# Patient Record
Sex: FEMALE | Race: WHITE | NOT HISPANIC OR LATINO | Employment: FULL TIME | URBAN - METROPOLITAN AREA
[De-identification: names, ages, dates, MRNs, and addresses within clinical notes are randomized per-mention and may not be internally consistent; named-entity substitution may affect disease eponyms.]

---

## 2017-04-14 DIAGNOSIS — Z13.1 ENCOUNTER FOR SCREENING FOR DIABETES MELLITUS: ICD-10-CM

## 2017-04-14 DIAGNOSIS — R53.83 OTHER FATIGUE: ICD-10-CM

## 2017-04-20 ENCOUNTER — TRANSCRIBE ORDERS (OUTPATIENT)
Dept: LAB | Facility: OTHER | Age: 62
End: 2017-04-20

## 2017-04-20 ENCOUNTER — APPOINTMENT (OUTPATIENT)
Dept: LAB | Facility: OTHER | Age: 62
End: 2017-04-20
Payer: COMMERCIAL

## 2017-04-20 DIAGNOSIS — Z13.1 ENCOUNTER FOR SCREENING FOR DIABETES MELLITUS: ICD-10-CM

## 2017-04-20 DIAGNOSIS — E78.5 HYPERLIPIDEMIA, UNSPECIFIED HYPERLIPIDEMIA TYPE: Primary | ICD-10-CM

## 2017-04-20 DIAGNOSIS — R53.83 OTHER FATIGUE: ICD-10-CM

## 2017-04-20 DIAGNOSIS — E78.5 HYPERLIPIDEMIA, UNSPECIFIED HYPERLIPIDEMIA TYPE: ICD-10-CM

## 2017-04-20 LAB
ALBUMIN SERPL BCP-MCNC: 3.7 G/DL (ref 3.5–5)
ALP SERPL-CCNC: 69 U/L (ref 46–116)
ALT SERPL W P-5'-P-CCNC: 18 U/L (ref 12–78)
ANION GAP SERPL CALCULATED.3IONS-SCNC: 7 MMOL/L (ref 4–13)
AST SERPL W P-5'-P-CCNC: 12 U/L (ref 5–45)
BILIRUB SERPL-MCNC: 0.48 MG/DL (ref 0.2–1)
BUN SERPL-MCNC: 17 MG/DL (ref 5–25)
CALCIUM SERPL-MCNC: 8.6 MG/DL (ref 8.3–10.1)
CHLORIDE SERPL-SCNC: 106 MMOL/L (ref 100–108)
CHOLEST SERPL-MCNC: 217 MG/DL (ref 50–200)
CO2 SERPL-SCNC: 27 MMOL/L (ref 21–32)
CREAT SERPL-MCNC: 0.59 MG/DL (ref 0.6–1.3)
GFR SERPL CREATININE-BSD FRML MDRD: >60 ML/MIN/1.73SQ M
GLUCOSE P FAST SERPL-MCNC: 92 MG/DL (ref 65–99)
HDLC SERPL-MCNC: 88 MG/DL (ref 40–60)
LDLC SERPL CALC-MCNC: 106 MG/DL (ref 0–100)
POTASSIUM SERPL-SCNC: 4.1 MMOL/L (ref 3.5–5.3)
PROT SERPL-MCNC: 6.7 G/DL (ref 6.4–8.2)
SODIUM SERPL-SCNC: 140 MMOL/L (ref 136–145)
TRIGL SERPL-MCNC: 115 MG/DL

## 2017-04-20 PROCEDURE — 80061 LIPID PANEL: CPT

## 2017-04-20 PROCEDURE — 36415 COLL VENOUS BLD VENIPUNCTURE: CPT

## 2017-04-20 PROCEDURE — 80053 COMPREHEN METABOLIC PANEL: CPT

## 2017-04-21 ENCOUNTER — GENERIC CONVERSION - ENCOUNTER (OUTPATIENT)
Dept: OTHER | Facility: OTHER | Age: 62
End: 2017-04-21

## 2017-07-06 ENCOUNTER — APPOINTMENT (OUTPATIENT)
Dept: LAB | Facility: OTHER | Age: 62
End: 2017-07-06
Payer: COMMERCIAL

## 2017-07-06 ENCOUNTER — GENERIC CONVERSION - ENCOUNTER (OUTPATIENT)
Dept: OTHER | Facility: OTHER | Age: 62
End: 2017-07-06

## 2017-07-06 ENCOUNTER — TRANSCRIBE ORDERS (OUTPATIENT)
Dept: LAB | Facility: OTHER | Age: 62
End: 2017-07-06

## 2017-07-06 DIAGNOSIS — Z00.8 HEALTH EXAMINATION IN POPULATION SURVEYS: Primary | ICD-10-CM

## 2017-07-06 DIAGNOSIS — Z00.8 HEALTH EXAMINATION IN POPULATION SURVEYS: ICD-10-CM

## 2017-07-06 LAB
CHOLEST SERPL-MCNC: 185 MG/DL (ref 50–200)
EST. AVERAGE GLUCOSE BLD GHB EST-MCNC: 114 MG/DL
HBA1C MFR BLD: 5.6 % (ref 4.2–6.3)
HDLC SERPL-MCNC: 73 MG/DL (ref 40–60)
LDLC SERPL CALC-MCNC: 98 MG/DL (ref 0–100)
TRIGL SERPL-MCNC: 70 MG/DL

## 2017-07-06 PROCEDURE — 83036 HEMOGLOBIN GLYCOSYLATED A1C: CPT

## 2017-07-06 PROCEDURE — 80061 LIPID PANEL: CPT

## 2017-07-06 PROCEDURE — 36415 COLL VENOUS BLD VENIPUNCTURE: CPT

## 2018-01-10 NOTE — RESULT NOTES
Verified Results  * MAMMO SCREENING BILATERAL W CAD [id number] 01:45PM Juan Antonio Anthony    Order Number: [id number]    Order Number: [id number]   TW Order Number: [id number]     Test Name Result Flag Reference   MAMMO SCREENING BILATERAL W CAD (Report)     Patient History:   Patient is postmenopausal    Family history of breast cancer in maternal cousin at age 27  Patient has never smoked  Patient's BMI is 27 4  Reason for exam: screening (asymptomatic)  Mammo Screening Bilateral W CAD: July 14, 2016 - Check In #:    [de-identified]   Bilateral CC and MLO view(s) were taken  Technologist: JOSE ANTONIO Ferrell (R)(M)   Prior study comparison: March 7, 2012, left breast unilateral    diagnostic mammogram, performed at [address] [place] [address]  June 8, 2011, left breast unilateral diagnostic    mammogram, performed at [address] [place] [address]  There are scattered fibroglandular densities  The parenchymal pattern appears stable  No dominant soft tissue    mass or suspicious calcifications are noted  The skin and nipple   contours are within normal limits  No mammographic evidence of malignancy  No    significant changes when compared with prior studies  ASSESSMENT: BiRad:1 - Negative     Recommendation:   Routine screening mammogram in 1 year  A reminder letter will be   scheduled  Analyzed by CAD     8-10% of cancers will be missed on mammography  Management of a    palpable abnormality must be based on clinical grounds  Patients   will be notified of their results via letter from our facility  Accredited by [employer] and FDA       Transcription Location: JOSE ANTONIO Nathan 98: [id number]     Risk Value(s):   Tyrer-Cuzick 10 Year: 2 729%, Tyrer-Cuzick Lifetime: 6 136%,    Myriad Table: 2 6%, HELGA 5 Year: 1 1%, NCI Lifetime: 4 9%   Signed by:   Miguel Dean MD   7/14/16       Discussion/Summary   normal mammogram recheck in 1 year

## 2018-01-13 VITALS
BODY MASS INDEX: 26.5 KG/M2 | DIASTOLIC BLOOD PRESSURE: 72 MMHG | SYSTOLIC BLOOD PRESSURE: 124 MMHG | HEIGHT: 62 IN | WEIGHT: 144 LBS

## 2018-01-18 NOTE — RESULT NOTES
Verified Results  (1) COMPREHENSIVE METABOLIC PANEL [id number] 04:12HM R[facility] [place] Order Number: [id number]     Test Name Result Flag Reference   SODIUM 140 mmol/L  136-145   POTASSIUM 4 1 mmol/L  3 5-5 3   CHLORIDE 106 mmol/L  100-108   CARBON DIOXIDE 27 mmol/L  21-32   ANION GAP (CALC) 7 mmol/L  4-13   BLOOD UREA NITROGEN 17 mg/dL  5-25   CREATININE 0 59 mg/dL L 0 60-1 30   Standardized to IDMS reference method   CALCIUM 8 6 mg/dL  8 3-10 1   BILI, TOTAL 0 48 mg/dL  0 20-1 00   ALK PHOSPHATAS 69 U/L     ALT (SGPT) 18 U/L  12-78   AST(SGOT) 12 U/L  5-45   ALBUMIN 3 7 g/dL  3 5-5 0   TOTAL PROTEIN 6 7 g/dL  6 4-8 2   eGFR Non-African American      >60 0 ml/min/1 73sq m   [facility] [employer] Education Program recommendations are as follows:  GFR calculation is accurate only with a steady state creatinine  Chronic Kidney disease less than 60 ml/min/1 73 sq  meters  Kidney failure less than 15 ml/min/1 73 sq  meters     GLUCOSE FASTING 92 mg/dL  65-99     (1) LIPID PANEL FASTING W DIRECT LDL REFLEX 20Apr2017 07:59AM Will Cheatham     Test Name Result Flag Reference   CHOLESTEROL 217 mg/dL H    LDL CHOLESTEROL CALCULATED 106 mg/dL H 0-100   This is a fasting blood test  Water,black tea or black  coffee only after 9:00pm the night before test  Drink 2 glasses of water the morning of test         Triglyceride:         Normal              <150 mg/dl       Borderline High    150-199 mg/dl       High               200-499 mg/dl       Very High          >499 mg/dl  Cholesterol:         Desirable        <200 mg/dl      Borderline High  200-239 mg/dl      High             >239 mg/dl  HDL Cholesterol:        High    >59 mg/dL      Low     <41 mg/dL  LDL Cholesterol:        Optimal          <100 mg/dl        Near Optimal     100-129 mg/dl        Above Optimal          Borderline High   130-159 mg/dl          High              160-189 mg/dl          Very High        >189 mg/dl  LDL CALCULATED:    This screening LDL is a calculated result  It does not have the accuracy of the Direct Measured LDL in the monitoring of patients with hyperlipidemia and/or statin therapy  Direct Measure LDL (XVZ987) must be ordered separately in these patients  TRIGLYCERIDES 115 mg/dL  <=150   Specimen collection should occur prior to N-Acetylcysteine or Metamizole administration due to the potential for falsely depressed results  HDL,DIRECT 88 mg/dL H 40-60   Specimen collection should occur prior to Metamizole administration due to the potential for falsely depressed results

## 2018-04-23 DIAGNOSIS — E78.00 HIGH CHOLESTEROL: Primary | ICD-10-CM

## 2018-04-23 RX ORDER — ROSUVASTATIN CALCIUM 5 MG/1
1 TABLET, COATED ORAL DAILY
COMMUNITY
End: 2018-04-23 | Stop reason: SDUPTHER

## 2018-04-23 RX ORDER — ROSUVASTATIN CALCIUM 5 MG/1
5 TABLET, COATED ORAL DAILY
Qty: 90 TABLET | Refills: 3 | Status: SHIPPED | OUTPATIENT
Start: 2018-04-23 | End: 2018-08-17 | Stop reason: ALTCHOICE

## 2018-06-07 DIAGNOSIS — F41.9 ANXIETY: Primary | ICD-10-CM

## 2018-06-07 RX ORDER — ESCITALOPRAM OXALATE 10 MG/1
10 TABLET ORAL DAILY
Qty: 90 TABLET | Refills: 3 | Status: SHIPPED | OUTPATIENT
Start: 2018-06-07 | End: 2019-03-14 | Stop reason: SDUPTHER

## 2018-06-07 RX ORDER — ESCITALOPRAM OXALATE 10 MG/1
1 TABLET ORAL DAILY
COMMUNITY
Start: 2014-05-16 | End: 2018-06-07 | Stop reason: SDUPTHER

## 2018-07-03 DIAGNOSIS — H93.8X2 SENSATION OF PLUGGED EAR ON LEFT SIDE: Primary | ICD-10-CM

## 2018-07-03 RX ORDER — PREDNISONE 10 MG/1
10 TABLET ORAL DAILY
Qty: 5 TABLET | Refills: 0 | Status: SHIPPED | OUTPATIENT
Start: 2018-07-03 | End: 2018-07-06

## 2018-07-06 ENCOUNTER — OFFICE VISIT (OUTPATIENT)
Dept: FAMILY MEDICINE CLINIC | Facility: CLINIC | Age: 63
End: 2018-07-06
Payer: COMMERCIAL

## 2018-07-06 VITALS
WEIGHT: 132 LBS | OXYGEN SATURATION: 98 % | HEIGHT: 61 IN | TEMPERATURE: 97 F | HEART RATE: 90 BPM | SYSTOLIC BLOOD PRESSURE: 134 MMHG | BODY MASS INDEX: 24.92 KG/M2 | DIASTOLIC BLOOD PRESSURE: 84 MMHG

## 2018-07-06 DIAGNOSIS — Z12.31 SCREENING MAMMOGRAM, ENCOUNTER FOR: ICD-10-CM

## 2018-07-06 DIAGNOSIS — H69.82 EUSTACHIAN TUBE DYSFUNCTION, LEFT: Primary | ICD-10-CM

## 2018-07-06 PROBLEM — H69.92 EUSTACHIAN TUBE DYSFUNCTION, LEFT: Status: ACTIVE | Noted: 2018-07-06

## 2018-07-06 PROCEDURE — 1101F PT FALLS ASSESS-DOCD LE1/YR: CPT | Performed by: FAMILY MEDICINE

## 2018-07-06 PROCEDURE — 99213 OFFICE O/P EST LOW 20 MIN: CPT | Performed by: FAMILY MEDICINE

## 2018-07-06 RX ORDER — PREDNISONE 10 MG/1
TABLET ORAL
Qty: 20 TABLET | Refills: 0 | Status: SHIPPED | OUTPATIENT
Start: 2018-07-06 | End: 2018-08-17 | Stop reason: ALTCHOICE

## 2018-07-06 NOTE — PROGRESS NOTES
Assessment/Plan:       Diagnoses and all orders for this visit:    Eustachian tube dysfunction, left  -     predniSONE 10 mg tablet; Take 4 tabs x 2 days, 3 tabs x 2 days, then 2 tabs x 2 days, then 1 tab x 2 days    Screening mammogram, encounter for  -     Mammo screening bilateral w cad; Future         Prednisone taper  Use Flonase and decongestants OTC  Call if not better 1 wk    Subjective:      Patient ID: Prabhakar Olivares is a 70 y o  female  Patient is having persistent left ear pressure and pain  She has been on Zithromax and prednisone 10 mg per day for the past week  She has also tried nasal sprays and over-the-counter antihistamines with no relief  She denies any fevers, nasal congestion, or sore throat  The following portions of the patient's history were reviewed and updated as appropriate: She  has no past medical history on file  She   Patient Active Problem List    Diagnosis Date Noted    Eustachian tube dysfunction, left 07/06/2018    Screening mammogram, encounter for 07/06/2018     She  has no past surgical history on file  Her family history is not on file  She  reports that she has never smoked  She has never used smokeless tobacco  Her alcohol and drug histories are not on file  Current Outpatient Prescriptions   Medication Sig Dispense Refill    escitalopram (LEXAPRO) 10 mg tablet Take 1 tablet (10 mg total) by mouth daily 90 tablet 3    predniSONE 10 mg tablet Take 4 tabs x 2 days, 3 tabs x 2 days, then 2 tabs x 2 days, then 1 tab x 2 days 20 tablet 0    rosuvastatin (CRESTOR) 5 mg tablet Take 1 tablet (5 mg total) by mouth daily 90 tablet 3     No current facility-administered medications for this visit        Current Outpatient Prescriptions on File Prior to Visit   Medication Sig    escitalopram (LEXAPRO) 10 mg tablet Take 1 tablet (10 mg total) by mouth daily    rosuvastatin (CRESTOR) 5 mg tablet Take 1 tablet (5 mg total) by mouth daily    [DISCONTINUED] predniSONE 10 mg tablet Take 1 tablet (10 mg total) by mouth daily     No current facility-administered medications on file prior to visit  She is allergic to sulfa antibiotics       Review of Systems   Constitutional: Negative for activity change, appetite change, chills and fever  HENT: Positive for ear pain  Negative for congestion  Eyes: Negative  Respiratory: Negative for shortness of breath and wheezing  Objective:      /84   Pulse 90   Temp (!) 97 °F (36 1 °C)   Ht 5' 1" (1 549 m)   Wt 59 9 kg (132 lb)   SpO2 98%   BMI 24 94 kg/m²          Physical Exam   Constitutional: She appears well-developed and well-nourished  No distress  HENT:   Head: Normocephalic and atraumatic  Right Ear: External ear normal    Left Ear: External ear normal  Tympanic membrane is injected  Mouth/Throat: Oropharynx is clear and moist  No oropharyngeal exudate  R ear small amt of cerumen in jeffrey  L ear TM slightly injected and dull   Eyes: Conjunctivae are normal  Pupils are equal, round, and reactive to light  Pulmonary/Chest: Effort normal    Skin: She is not diaphoretic  Nursing note and vitals reviewed

## 2018-08-02 ENCOUNTER — APPOINTMENT (OUTPATIENT)
Dept: LAB | Facility: CLINIC | Age: 63
End: 2018-08-02

## 2018-08-02 ENCOUNTER — TRANSCRIBE ORDERS (OUTPATIENT)
Dept: ADMINISTRATIVE | Facility: HOSPITAL | Age: 63
End: 2018-08-02

## 2018-08-02 DIAGNOSIS — Z00.8 HEALTH EXAMINATION IN POPULATION SURVEY: ICD-10-CM

## 2018-08-02 DIAGNOSIS — Z00.8 HEALTH EXAMINATION IN POPULATION SURVEY: Primary | ICD-10-CM

## 2018-08-02 LAB
CHOLEST SERPL-MCNC: 206 MG/DL (ref 50–200)
EST. AVERAGE GLUCOSE BLD GHB EST-MCNC: 120 MG/DL
HBA1C MFR BLD: 5.8 % (ref 4.2–6.3)
HDLC SERPL-MCNC: 75 MG/DL (ref 40–60)
LDLC SERPL CALC-MCNC: 109 MG/DL (ref 0–100)
NONHDLC SERPL-MCNC: 131 MG/DL
TRIGL SERPL-MCNC: 108 MG/DL

## 2018-08-02 PROCEDURE — 80061 LIPID PANEL: CPT

## 2018-08-02 PROCEDURE — 36415 COLL VENOUS BLD VENIPUNCTURE: CPT

## 2018-08-02 PROCEDURE — 83036 HEMOGLOBIN GLYCOSYLATED A1C: CPT

## 2018-08-17 ENCOUNTER — OFFICE VISIT (OUTPATIENT)
Dept: FAMILY MEDICINE CLINIC | Facility: CLINIC | Age: 63
End: 2018-08-17
Payer: COMMERCIAL

## 2018-08-17 VITALS
DIASTOLIC BLOOD PRESSURE: 76 MMHG | HEIGHT: 61 IN | BODY MASS INDEX: 24.92 KG/M2 | SYSTOLIC BLOOD PRESSURE: 138 MMHG | OXYGEN SATURATION: 98 % | TEMPERATURE: 97 F | HEART RATE: 81 BPM | WEIGHT: 132 LBS

## 2018-08-17 DIAGNOSIS — F41.8 SITUATIONAL ANXIETY: Primary | ICD-10-CM

## 2018-08-17 PROBLEM — H69.92 EUSTACHIAN TUBE DYSFUNCTION, LEFT: Status: RESOLVED | Noted: 2018-07-06 | Resolved: 2018-08-17

## 2018-08-17 PROBLEM — H69.82 EUSTACHIAN TUBE DYSFUNCTION, LEFT: Status: RESOLVED | Noted: 2018-07-06 | Resolved: 2018-08-17

## 2018-08-17 PROCEDURE — 3008F BODY MASS INDEX DOCD: CPT | Performed by: NURSE PRACTITIONER

## 2018-08-17 PROCEDURE — 99214 OFFICE O/P EST MOD 30 MIN: CPT | Performed by: NURSE PRACTITIONER

## 2018-08-17 RX ORDER — PROPRANOLOL HYDROCHLORIDE 20 MG/1
20 TABLET ORAL EVERY 12 HOURS SCHEDULED
Qty: 60 TABLET | Refills: 0 | Status: SHIPPED | OUTPATIENT
Start: 2018-08-17 | End: 2020-09-16

## 2018-08-17 RX ORDER — SIMVASTATIN 10 MG
10 TABLET ORAL
COMMUNITY
End: 2019-08-09 | Stop reason: SDUPTHER

## 2018-08-17 NOTE — PROGRESS NOTES
Assessment/Plan:    No problem-specific Assessment & Plan notes found for this encounter  Diagnoses and all orders for this visit:    Situational anxiety  Comments: Will order the Propranolol to take as needed for anxiety in addition to staying on the Lexapro 10 mg daily   Orders:  -     propranolol (INDERAL) 20 mg tablet; Take 1 tablet (20 mg total) by mouth every 12 (twelve) hours for 30 days    Other orders  -     simvastatin (ZOCOR) 10 mg tablet; Take 10 mg by mouth daily at bedtime          Subjective:      Patient ID: Kyaw Cervantes is a 70 y o  female  Patient here with check up on her anxiety and feeling stress and major stressor is work and reports that her symptoms are getting worse when she is at work and at home is doing fine no issues  Patient reports that it is affecting patient to the extent that when she goes home she eats dinner and is in bed by 7 PM and sleeping until 3 AM and no issue with sleep and waking up and has her routine of having tea and waking about 6 AM and ruminating about the work day  Patient when she is at work feeling the stress building throughout the day and 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 and feeling tense and anxious regarding  Patient is taking Lexapro 10 mg  PHQ-9 score is 6   and TIFFANIE-7 scoring 11         The following portions of the patient's history were reviewed and updated as appropriate: She  has a past medical history of Hypercholesterolemia  She   Patient Active Problem List    Diagnosis Date Noted    Situational anxiety 08/17/2018    Screening mammogram, encounter for 07/06/2018     She  has a past surgical history that includes Colonoscopy and Dilation and curettage of uterus  Her family history includes Arthritis in her family; Brain cancer in her father; Hyperlipidemia in her family; Hypertension in her family; Lung cancer in her father  She  reports that she has never smoked   She has never used smokeless tobacco  She reports that she does not drink alcohol or use drugs  She is allergic to sulfa antibiotics       Review of Systems   Constitutional: Positive for fatigue  HENT: Negative  Eyes: Negative  Respiratory: Negative  Cardiovascular: Negative  Gastrointestinal: Negative  Endocrine: Negative  Genitourinary: Negative  Musculoskeletal: Negative  Skin: Negative  Allergic/Immunologic: Negative  Neurological: Negative  Hematological: Negative  Psychiatric/Behavioral: Positive for dysphoric mood and sleep disturbance  The patient is nervous/anxious  Objective:      /76   Pulse 81   Temp (!) 97 °F (36 1 °C)   Ht 5' 1" (1 549 m)   Wt 59 9 kg (132 lb)   SpO2 98%   BMI 24 94 kg/m²          Physical Exam   Constitutional: She is oriented to person, place, and time  Vital signs are normal  She appears well-developed and well-nourished  No distress  HENT:   Head: Normocephalic and atraumatic  Eyes: Pupils are equal, round, and reactive to light  Neck: Normal range of motion  No thyromegaly present  Cardiovascular: Normal rate, regular rhythm, normal heart sounds and intact distal pulses  No murmur heard  Pulmonary/Chest: Effort normal and breath sounds normal  No respiratory distress  She has no wheezes  Abdominal: Soft  Bowel sounds are normal    Musculoskeletal: Normal range of motion  Neurological: She is alert and oriented to person, place, and time  Skin: Skin is warm and dry  Psychiatric: Her speech is normal and behavior is normal  Judgment and thought content normal  Her mood appears anxious  Cognition and memory are normal    Nursing note and vitals reviewed

## 2018-10-18 ENCOUNTER — APPOINTMENT (OUTPATIENT)
Dept: LAB | Facility: CLINIC | Age: 63
End: 2018-10-18
Payer: COMMERCIAL

## 2018-10-18 ENCOUNTER — OFFICE VISIT (OUTPATIENT)
Dept: FAMILY MEDICINE CLINIC | Facility: CLINIC | Age: 63
End: 2018-10-18
Payer: COMMERCIAL

## 2018-10-18 VITALS
OXYGEN SATURATION: 98 % | BODY MASS INDEX: 24.55 KG/M2 | TEMPERATURE: 100.9 F | SYSTOLIC BLOOD PRESSURE: 128 MMHG | DIASTOLIC BLOOD PRESSURE: 78 MMHG | HEART RATE: 58 BPM | WEIGHT: 130 LBS | HEIGHT: 61 IN

## 2018-10-18 DIAGNOSIS — R50.9 FEVER AND CHILLS: ICD-10-CM
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
## 2018-10-18 DIAGNOSIS — M54.5 LOW BACK PAIN, UNSPECIFIED BACK PAIN LATERALITY, UNSPECIFIED CHRONICITY, WITH SCIATICA PRESENCE UNSPECIFIED: Primary | ICD-10-CM

## 2018-10-18 DIAGNOSIS — M25.50 ARTHRALGIA, UNSPECIFIED JOINT: ICD-10-CM

## 2018-10-18 PROBLEM — M54.50 LOW BACK PAIN: Status: ACTIVE | Noted: 2018-10-18

## 2018-10-18 LAB
ALBUMIN SERPL BCP-MCNC: 4.2 G/DL (ref 3.5–5)
ALP SERPL-CCNC: 78 U/L (ref 46–116)
ALT SERPL W P-5'-P-CCNC: 17 U/L (ref 12–78)
ANION GAP SERPL CALCULATED.3IONS-SCNC: 7 MMOL/L (ref 4–13)
AST SERPL W P-5'-P-CCNC: 16 U/L (ref 5–45)
BASOPHILS # BLD AUTO: 0.03 THOUSANDS/ΜL (ref 0–0.1)
BASOPHILS NFR BLD AUTO: 0 % (ref 0–1)
BILIRUB SERPL-MCNC: 0.34 MG/DL (ref 0.2–1)
BUN SERPL-MCNC: 13 MG/DL (ref 5–25)
CALCIUM SERPL-MCNC: 9.5 MG/DL (ref 8.3–10.1)
CHLORIDE SERPL-SCNC: 102 MMOL/L (ref 100–108)
CO2 SERPL-SCNC: 26 MMOL/L (ref 21–32)
CREAT SERPL-MCNC: 0.83 MG/DL (ref 0.6–1.3)
EOSINOPHIL # BLD AUTO: 0 THOUSAND/ΜL (ref 0–0.61)
EOSINOPHIL NFR BLD AUTO: 0 % (ref 0–6)
ERYTHROCYTE [DISTWIDTH] IN BLOOD BY AUTOMATED COUNT: 12.9 % (ref 11.6–15.1)
GFR SERPL CREATININE-BSD FRML MDRD: 74 ML/MIN/1.73SQ M
GLUCOSE P FAST SERPL-MCNC: 121 MG/DL (ref 65–99)
HCT VFR BLD AUTO: 49.1 % (ref 34.8–46.1)
HGB BLD-MCNC: 15 G/DL (ref 11.5–15.4)
IMM GRANULOCYTES # BLD AUTO: 0.03 THOUSAND/UL (ref 0–0.2)
IMM GRANULOCYTES NFR BLD AUTO: 0 % (ref 0–2)
LYMPHOCYTES # BLD AUTO: 1.01 THOUSANDS/ΜL (ref 0.6–4.47)
LYMPHOCYTES NFR BLD AUTO: 12 % (ref 14–44)
MCH RBC QN AUTO: 27.3 PG (ref 26.8–34.3)
MCHC RBC AUTO-ENTMCNC: 30.5 G/DL (ref 31.4–37.4)
MCV RBC AUTO: 89 FL (ref 82–98)
MONOCYTES # BLD AUTO: 1.07 THOUSAND/ΜL (ref 0.17–1.22)
MONOCYTES NFR BLD AUTO: 12 % (ref 4–12)
NEUTROPHILS # BLD AUTO: 6.66 THOUSANDS/ΜL (ref 1.85–7.62)
NEUTS SEG NFR BLD AUTO: 76 % (ref 43–75)
NRBC BLD AUTO-RTO: 0 /100 WBCS
PLATELET # BLD AUTO: 263 THOUSANDS/UL (ref 149–390)
PMV BLD AUTO: 10.3 FL (ref 8.9–12.7)
POTASSIUM SERPL-SCNC: 3.9 MMOL/L (ref 3.5–5.3)
PROT SERPL-MCNC: 7.9 G/DL (ref 6.4–8.2)
RBC # BLD AUTO: 5.5 MILLION/UL (ref 3.81–5.12)
SL AMB  POCT GLUCOSE, UA: ABNORMAL
SL AMB LEUKOCYTE ESTERASE,UA: ABNORMAL
SL AMB POCT BILIRUBIN,UA: ABNORMAL
SL AMB POCT BLOOD,UA: ABNORMAL
SL AMB POCT CLARITY,UA: CLEAR
SL AMB POCT COLOR,UA: YELLOW
SL AMB POCT KETONES,UA: ABNORMAL
SL AMB POCT NITRITE,UA: ABNORMAL
SL AMB POCT PH,UA: 5
SL AMB POCT SPECIFIC GRAVITY,UA: 1.03
SL AMB POCT URINE PROTEIN: ABNORMAL
SL AMB POCT UROBILINOGEN: 0.2
SODIUM SERPL-SCNC: 135 MMOL/L (ref 136–145)
WBC # BLD AUTO: 8.8 THOUSAND/UL (ref 4.31–10.16)

## 2018-10-18 PROCEDURE — 86788 WEST NILE VIRUS AB IGM: CPT

## 2018-10-18 PROCEDURE — 99213 OFFICE O/P EST LOW 20 MIN: CPT | Performed by: NURSE PRACTITIONER

## 2018-10-18 PROCEDURE — 86789 WEST NILE VIRUS ANTIBODY: CPT

## 2018-10-18 PROCEDURE — 81003 URINALYSIS AUTO W/O SCOPE: CPT | Performed by: NURSE PRACTITIONER

## 2018-10-18 PROCEDURE — 3008F BODY MASS INDEX DOCD: CPT | Performed by: NURSE PRACTITIONER

## 2018-10-18 PROCEDURE — 87086 URINE CULTURE/COLONY COUNT: CPT | Performed by: NURSE PRACTITIONER

## 2018-10-18 PROCEDURE — 36415 COLL VENOUS BLD VENIPUNCTURE: CPT

## 2018-10-18 PROCEDURE — 1036F TOBACCO NON-USER: CPT | Performed by: NURSE PRACTITIONER

## 2018-10-18 PROCEDURE — 80053 COMPREHEN METABOLIC PANEL: CPT

## 2018-10-18 PROCEDURE — 85025 COMPLETE CBC W/AUTO DIFF WBC: CPT

## 2018-10-18 NOTE — PROGRESS NOTES
Assessment/Plan:    No problem-specific Assessment & Plan notes found for this encounter  Diagnoses and all orders for this visit:    Fever and chills  Comments:  Unclear etiology with her recent mosquito bites and symptoms of arthralgias fever and chills and fatigue will check labs for [place]ile   Orders:  -     CBC and differential; Future  -     Comprehensive metabolic panel; Future  -     West Nile antibodies, IgG and IgM; Future    Low back pain, unspecified back pain laterality, unspecified chronicity, with sciatica presence unspecified  -     POCT urine dip auto non-scope    Mosquito bite, initial encounter  -     CBC and differential; Future  -     Comprehensive metabolic panel; Future  -     West Nile antibodies, IgG and IgM; Future    Arthralgia, unspecified joint  -     CBC and differential; Future  -     Comprehensive metabolic panel; Future  -     West Nile antibodies, IgG and IgM; Future          Subjective:      Patient ID: Martin Moya is a 70 y o  female  Patient here and reports that she is haviung severe body aches and legs joints and arms and having a cough and feeling unwell feeling fatigued and weak  Patient is not eating or drinking much  Patient has lower back pains and no complaints of urinary symptoms  The following portions of the patient's history were reviewed and updated as appropriate:   She  has a past medical history of Hypercholesterolemia  She   Patient Active Problem List    Diagnosis Date Noted    Low back pain 10/18/2018    Mosquito bite 10/18/2018    Arthralgia 10/18/2018    Fever and chills 10/18/2018    Situational anxiety 08/17/2018    Screening mammogram, encounter for 07/06/2018     She  has a past surgical history that includes Colonoscopy and Dilation and curettage of uterus  Her family history includes Arthritis in her family; Brain cancer in her father; Hyperlipidemia in her family; Hypertension in her family; Lung cancer in her father    She reports that she has never smoked  She has never used smokeless tobacco  She reports that she does not drink alcohol or use drugs  She is allergic to sulfa antibiotics       Review of Systems   Constitutional: Positive for activity change, appetite change, fatigue and fever  HENT: Positive for congestion and sneezing  Eyes: Negative  Respiratory: Positive for cough  Cardiovascular: Negative  Gastrointestinal: Positive for nausea  Endocrine: Negative  Genitourinary: Positive for hematuria  Musculoskeletal: Positive for back pain  Skin: Negative  Allergic/Immunologic: Negative  Neurological: Positive for headaches  Hematological: Negative  Psychiatric/Behavioral: Negative  Objective:      /78   Pulse 58   Temp (!) 100 9 °F (38 3 °C)   Ht 5' 1" (1 549 m)   Wt 59 kg (130 lb)   SpO2 98%   BMI 24 56 kg/m²          Physical Exam   Constitutional: She is oriented to person, place, and time  Vital signs are normal  She appears well-developed and well-nourished  She has a sickly appearance  HENT:   Head: Normocephalic and atraumatic  Right Ear: External ear normal    Left Ear: External ear normal    Nose: Nose normal    Mouth/Throat: Oropharynx is clear and moist  No oropharyngeal exudate  Eyes: Pupils are equal, round, and reactive to light  Neck: Normal range of motion  No thyromegaly present  Cardiovascular: Normal rate, regular rhythm, normal heart sounds and intact distal pulses  No murmur heard  Pulmonary/Chest: Effort normal and breath sounds normal  No respiratory distress  She has no wheezes  Abdominal: Soft  Bowel sounds are normal    Musculoskeletal: Normal range of motion  Neurological: She is alert and oriented to person, place, and time  Skin: Skin is warm and dry  Psychiatric: She has a normal mood and affect  Her behavior is normal  Judgment and thought content normal    Nursing note and vitals reviewed

## 2018-10-19 LAB — BACTERIA UR CULT: NORMAL

## 2018-10-22 DIAGNOSIS — J01.00 ACUTE NON-RECURRENT MAXILLARY SINUSITIS: Primary | ICD-10-CM

## 2018-10-22 LAB
WNV IGG SER QL IA: NEGATIVE
WNV IGM SER QL IA: NEGATIVE

## 2018-10-22 RX ORDER — AZITHROMYCIN 250 MG/1
TABLET, FILM COATED ORAL
Qty: 6 TABLET | Refills: 1 | Status: SHIPPED | OUTPATIENT
Start: 2018-10-22 | End: 2018-10-26

## 2018-10-24 ENCOUNTER — TELEPHONE (OUTPATIENT)
Dept: FAMILY MEDICINE CLINIC | Facility: CLINIC | Age: 63
End: 2018-10-24

## 2018-10-24 NOTE — TELEPHONE ENCOUNTER
Patient needs a work excuse for the rest of the week, not feeling any better  She will return on Monday  Call pt if ok

## 2018-11-12 ENCOUNTER — CLINICAL SUPPORT (OUTPATIENT)
Dept: FAMILY MEDICINE CLINIC | Facility: CLINIC | Age: 63
End: 2018-11-12
Payer: COMMERCIAL

## 2018-11-12 DIAGNOSIS — Z23 NEED FOR PNEUMOCOCCAL VACCINATION: Primary | ICD-10-CM

## 2018-11-12 PROCEDURE — 90670 PCV13 VACCINE IM: CPT

## 2018-11-12 PROCEDURE — 90471 IMMUNIZATION ADMIN: CPT

## 2019-01-25 ENCOUNTER — TELEPHONE (OUTPATIENT)
Dept: FAMILY MEDICINE CLINIC | Facility: CLINIC | Age: 64
End: 2019-01-25

## 2019-01-25 DIAGNOSIS — R09.81 HEAD CONGESTION: Primary | ICD-10-CM

## 2019-01-25 DIAGNOSIS — J32.9 OTHER SINUSITIS, UNSPECIFIED CHRONICITY: Primary | ICD-10-CM

## 2019-01-25 RX ORDER — METHYLPREDNISOLONE 4 MG/1
TABLET ORAL
Qty: 1 EACH | Refills: 0 | Status: CANCELLED | OUTPATIENT
Start: 2019-01-25

## 2019-01-25 RX ORDER — METHYLPREDNISOLONE 4 MG/1
TABLET ORAL
Qty: 21 EACH | Refills: 0 | Status: SHIPPED | OUTPATIENT
Start: 2019-01-25 | End: 2020-09-16

## 2019-01-25 RX ORDER — FLUTICASONE PROPIONATE 50 MCG
1 SPRAY, SUSPENSION (ML) NASAL DAILY
Qty: 1 BOTTLE | Refills: 1 | Status: SHIPPED | OUTPATIENT
Start: 2019-01-25 | End: 2022-03-17

## 2019-01-25 NOTE — TELEPHONE ENCOUNTER
Patient has a sinus infection - a lot of PND , would like to know if she can have a steroid krishna - will order, please approve

## 2019-03-14 DIAGNOSIS — F41.9 ANXIETY: ICD-10-CM

## 2019-03-14 RX ORDER — ESCITALOPRAM OXALATE 10 MG/1
10 TABLET ORAL DAILY
Qty: 90 TABLET | Refills: 3 | Status: SHIPPED | OUTPATIENT
Start: 2019-03-14 | End: 2019-08-09 | Stop reason: SDUPTHER

## 2019-04-14 DIAGNOSIS — E78.00 HIGH CHOLESTEROL: ICD-10-CM

## 2019-04-14 RX ORDER — ROSUVASTATIN CALCIUM 5 MG/1
TABLET, COATED ORAL
Qty: 90 TABLET | Refills: 3 | Status: SHIPPED | OUTPATIENT
Start: 2019-04-14 | End: 2020-09-16

## 2019-08-01 DIAGNOSIS — Z13.1 SCREENING FOR DIABETES MELLITUS: ICD-10-CM

## 2019-08-01 DIAGNOSIS — E78.5 HYPERLIPIDEMIA, UNSPECIFIED HYPERLIPIDEMIA TYPE: Primary | ICD-10-CM

## 2019-08-09 ENCOUNTER — TRANSCRIBE ORDERS (OUTPATIENT)
Dept: ADMINISTRATIVE | Facility: HOSPITAL | Age: 64
End: 2019-08-09

## 2019-08-09 DIAGNOSIS — Z12.39 BREAST SCREENING, UNSPECIFIED: Primary | ICD-10-CM

## 2019-08-09 DIAGNOSIS — E78.00 HIGH CHOLESTEROL: Primary | ICD-10-CM

## 2019-08-09 DIAGNOSIS — F41.9 ANXIETY: ICD-10-CM

## 2019-08-09 RX ORDER — ESCITALOPRAM OXALATE 10 MG/1
10 TABLET ORAL DAILY
Qty: 90 TABLET | Refills: 3 | Status: SHIPPED | OUTPATIENT
Start: 2019-08-09 | End: 2020-03-18

## 2019-08-09 RX ORDER — SIMVASTATIN 10 MG
10 TABLET ORAL
Qty: 90 TABLET | Refills: 3 | Status: SHIPPED | OUTPATIENT
Start: 2019-08-09 | End: 2020-03-12

## 2019-08-15 ENCOUNTER — HOSPITAL ENCOUNTER (OUTPATIENT)
Dept: MAMMOGRAPHY | Facility: CLINIC | Age: 64
Discharge: HOME | End: 2019-08-15
Payer: COMMERCIAL

## 2019-08-15 VITALS — WEIGHT: 132 LBS | BODY MASS INDEX: 24.29 KG/M2 | HEIGHT: 62 IN

## 2019-08-15 DIAGNOSIS — Z12.39 BREAST SCREENING, UNSPECIFIED: ICD-10-CM

## 2019-08-15 PROCEDURE — 77067 SCR MAMMO BI INCL CAD: CPT

## 2019-08-22 ENCOUNTER — APPOINTMENT (OUTPATIENT)
Dept: LAB | Facility: CLINIC | Age: 64
End: 2019-08-22
Payer: COMMERCIAL

## 2019-08-22 DIAGNOSIS — Z13.1 SCREENING FOR DIABETES MELLITUS: ICD-10-CM

## 2019-08-22 DIAGNOSIS — E78.5 HYPERLIPIDEMIA, UNSPECIFIED HYPERLIPIDEMIA TYPE: ICD-10-CM

## 2019-08-22 LAB
ALBUMIN SERPL BCP-MCNC: 4.2 G/DL (ref 3.5–5)
ALP SERPL-CCNC: 70 U/L (ref 46–116)
ALT SERPL W P-5'-P-CCNC: 16 U/L (ref 12–78)
ANION GAP SERPL CALCULATED.3IONS-SCNC: 9 MMOL/L (ref 4–13)
AST SERPL W P-5'-P-CCNC: 17 U/L (ref 5–45)
BILIRUB SERPL-MCNC: 0.61 MG/DL (ref 0.2–1)
BUN SERPL-MCNC: 18 MG/DL (ref 5–25)
CALCIUM SERPL-MCNC: 9 MG/DL (ref 8.3–10.1)
CHLORIDE SERPL-SCNC: 111 MMOL/L (ref 100–108)
CHOLEST SERPL-MCNC: 205 MG/DL (ref 50–200)
CO2 SERPL-SCNC: 27 MMOL/L (ref 21–32)
CREAT SERPL-MCNC: 0.55 MG/DL (ref 0.6–1.3)
GFR SERPL CREATININE-BSD FRML MDRD: 98 ML/MIN/1.73SQ M
GLUCOSE P FAST SERPL-MCNC: 62 MG/DL (ref 65–99)
HDLC SERPL-MCNC: 73 MG/DL (ref 40–60)
LDLC SERPL CALC-MCNC: 117 MG/DL (ref 0–100)
NONHDLC SERPL-MCNC: 132 MG/DL
POTASSIUM SERPL-SCNC: 4.2 MMOL/L (ref 3.5–5.3)
PROT SERPL-MCNC: 6.7 G/DL (ref 6.4–8.2)
SODIUM SERPL-SCNC: 147 MMOL/L (ref 136–145)
TRIGL SERPL-MCNC: 73 MG/DL

## 2019-08-22 PROCEDURE — 80061 LIPID PANEL: CPT

## 2019-08-22 PROCEDURE — 80053 COMPREHEN METABOLIC PANEL: CPT

## 2019-08-22 PROCEDURE — 36415 COLL VENOUS BLD VENIPUNCTURE: CPT

## 2019-08-29 DIAGNOSIS — R09.81 SINUS CONGESTION: Primary | ICD-10-CM

## 2019-08-29 RX ORDER — AZITHROMYCIN 500 MG/1
500 TABLET, FILM COATED ORAL DAILY
COMMUNITY
End: 2019-08-29

## 2019-08-29 RX ORDER — AZITHROMYCIN 1 G
1 PACKET (EA) ORAL ONCE
COMMUNITY
End: 2019-08-29

## 2019-08-29 RX ORDER — AZITHROMYCIN 250 MG/1
TABLET, FILM COATED ORAL
Qty: 6 TABLET | Refills: 2 | Status: SHIPPED | OUTPATIENT
Start: 2019-08-29 | End: 2019-09-02

## 2019-08-29 RX ORDER — AZITHROMYCIN 250 MG/1
500 TABLET, FILM COATED ORAL EVERY 24 HOURS
COMMUNITY
End: 2019-08-29

## 2020-03-12 DIAGNOSIS — E78.00 HIGH CHOLESTEROL: ICD-10-CM

## 2020-03-12 RX ORDER — SIMVASTATIN 10 MG
10 TABLET ORAL
Qty: 30 TABLET | Refills: 0 | Status: SHIPPED | OUTPATIENT
Start: 2020-03-12 | End: 2020-06-15

## 2020-03-18 DIAGNOSIS — F41.9 ANXIETY: ICD-10-CM

## 2020-03-18 RX ORDER — ESCITALOPRAM OXALATE 10 MG/1
10 TABLET ORAL DAILY
Qty: 90 TABLET | Refills: 0 | Status: SHIPPED | OUTPATIENT
Start: 2020-03-18 | End: 2020-04-22

## 2020-04-22 DIAGNOSIS — F41.9 ANXIETY: ICD-10-CM

## 2020-04-22 RX ORDER — ESCITALOPRAM OXALATE 10 MG/1
10 TABLET ORAL DAILY
Qty: 90 TABLET | Refills: 0 | Status: SHIPPED | OUTPATIENT
Start: 2020-04-22 | End: 2020-07-17

## 2020-06-13 DIAGNOSIS — E78.00 HIGH CHOLESTEROL: ICD-10-CM

## 2020-06-15 RX ORDER — SIMVASTATIN 10 MG
TABLET ORAL
Qty: 30 TABLET | Refills: 0 | Status: SHIPPED | OUTPATIENT
Start: 2020-06-15 | End: 2020-07-17

## 2020-07-17 DIAGNOSIS — E78.00 HIGH CHOLESTEROL: ICD-10-CM

## 2020-07-17 DIAGNOSIS — F41.9 ANXIETY: ICD-10-CM

## 2020-07-17 RX ORDER — ESCITALOPRAM OXALATE 10 MG/1
10 TABLET ORAL DAILY
Qty: 90 TABLET | Refills: 0 | Status: SHIPPED | OUTPATIENT
Start: 2020-07-17 | End: 2020-09-16 | Stop reason: SDUPTHER

## 2020-07-17 RX ORDER — SIMVASTATIN 10 MG
TABLET ORAL
Qty: 30 TABLET | Refills: 0 | Status: SHIPPED | OUTPATIENT
Start: 2020-07-17 | End: 2020-09-07

## 2020-09-07 DIAGNOSIS — E78.00 HIGH CHOLESTEROL: ICD-10-CM

## 2020-09-07 RX ORDER — SIMVASTATIN 10 MG
TABLET ORAL
Qty: 30 TABLET | Refills: 0 | Status: SHIPPED | OUTPATIENT
Start: 2020-09-07 | End: 2020-09-16

## 2020-09-13 DIAGNOSIS — E78.00 HIGH CHOLESTEROL: ICD-10-CM

## 2020-09-14 RX ORDER — SIMVASTATIN 10 MG
TABLET ORAL
Qty: 30 TABLET | Refills: 0 | OUTPATIENT
Start: 2020-09-14

## 2020-09-14 NOTE — PROGRESS NOTES
Assessment/Plan:       Diagnoses and all orders for this visit:    Anxiety  -     Comprehensive metabolic panel; Future  -     Lipid panel; Future    Hyperlipidemia, mixed  -     Comprehensive metabolic panel; Future  -     Lipid panel; Future  -     rosuvastatin (CRESTOR) 5 mg tablet; Take 1 tablet (5 mg total) by mouth daily        No problem-specific Assessment & Plan notes found for this encounter  Subjective:      Patient ID: Arie Boston is a 77 y o  female  Patient is here for follow up  Anxiety- she feels very good  She would like lexapro refilled  HLD- she would like to have her lipid panel checked  She stopped simvastatin one rolo ago and is taking omega 3  The following portions of the patient's history were reviewed and updated as appropriate:   She has a past medical history of Hypercholesterolemia ,  does not have any pertinent problems on file  ,   has a past surgical history that includes Colonoscopy and Dilation and curettage of uterus  ,  family history includes Arthritis in her family; Brain cancer in her father; Breast cancer in her cousin; Hyperlipidemia in her family; Hypertension in her family; Lung cancer in her father  ,   reports that she has never smoked  She has never used smokeless tobacco  She reports that she does not drink alcohol or use drugs  ,  is allergic to acetazolamide and sulfa antibiotics     Current Outpatient Medications   Medication Sig Dispense Refill    escitalopram (LEXAPRO) 10 mg tablet TAKE 1 TABLET (10 MG TOTAL) BY MOUTH DAILY 90 tablet 0    fluticasone (FLONASE) 50 mcg/act nasal spray 1 spray into each nostril daily 1 Bottle 1    rosuvastatin (CRESTOR) 5 mg tablet Take 1 tablet (5 mg total) by mouth daily 90 tablet 3     No current facility-administered medications for this visit  Review of Systems   Constitutional: Negative  Negative for fatigue and fever  HENT: Negative  Negative for congestion  Eyes: Negative    Negative for visual disturbance  Respiratory: Negative for cough, chest tightness, shortness of breath and wheezing  Cardiovascular: Negative  Gastrointestinal: Negative  Negative for abdominal pain, blood in stool, diarrhea and nausea  Endocrine: Negative for polydipsia, polyphagia and polyuria  Genitourinary: Negative for difficulty urinating and flank pain  Musculoskeletal: Negative  Negative for arthralgias, back pain and myalgias  Skin: Negative  Negative for color change, pallor and rash  Allergic/Immunologic: Negative for immunocompromised state  Neurological: Negative  Negative for dizziness, weakness, light-headedness, numbness and headaches  Hematological: Negative for adenopathy  Psychiatric/Behavioral: Negative  Negative for confusion, decreased concentration and sleep disturbance  All other systems reviewed and are negative  Objective:  Vitals:    09/16/20 0832   BP: 122/68   BP Location: Left arm   Patient Position: Sitting   Pulse: 63   Resp: 18   Temp: (!) 96 8 °F (36 °C)   SpO2: 98%   Weight: 60 3 kg (133 lb)   Height: 5' 1 5" (1 562 m)     Body mass index is 24 72 kg/m²  Physical Exam  Vitals signs and nursing note reviewed  Constitutional:       General: She is not in acute distress  Appearance: Normal appearance  She is well-developed  She is not ill-appearing, toxic-appearing or diaphoretic  HENT:      Head: Normocephalic and atraumatic  Right Ear: Tympanic membrane, ear canal and external ear normal       Left Ear: Tympanic membrane, ear canal and external ear normal       Nose: Nose normal  No congestion or rhinorrhea  Mouth/Throat:      Mouth: Mucous membranes are moist       Pharynx: Oropharynx is clear  No oropharyngeal exudate or posterior oropharyngeal erythema  Eyes:      General: No scleral icterus  Conjunctiva/sclera: Conjunctivae normal       Pupils: Pupils are equal, round, and reactive to light     Neck:      Musculoskeletal: Normal range of motion and neck supple  Vascular: No JVD  Cardiovascular:      Rate and Rhythm: Normal rate and regular rhythm  Pulses: Normal pulses  Heart sounds: Normal heart sounds  No murmur  No friction rub  No gallop  Pulmonary:      Effort: Pulmonary effort is normal  No respiratory distress  Breath sounds: Normal breath sounds  No wheezing or rhonchi  Abdominal:      General: Bowel sounds are normal  There is no distension  Palpations: Abdomen is soft  Tenderness: There is no abdominal tenderness  Musculoskeletal: Normal range of motion  General: No swelling, tenderness or deformity  Right lower leg: No edema  Left lower leg: No edema  Lymphadenopathy:      Cervical: No cervical adenopathy  Skin:     General: Skin is warm and dry  Capillary Refill: Capillary refill takes less than 2 seconds  Coloration: Skin is not jaundiced  Findings: No bruising or rash  Neurological:      General: No focal deficit present  Mental Status: She is alert and oriented to person, place, and time  Cranial Nerves: No cranial nerve deficit  Sensory: No sensory deficit  Coordination: Coordination normal       Gait: Gait normal    Psychiatric:         Mood and Affect: Mood normal          Behavior: Behavior normal          Thought Content:  Thought content normal          Judgment: Judgment normal

## 2020-09-16 ENCOUNTER — APPOINTMENT (OUTPATIENT)
Dept: LAB | Facility: CLINIC | Age: 65
End: 2020-09-16
Payer: MEDICARE

## 2020-09-16 ENCOUNTER — OFFICE VISIT (OUTPATIENT)
Dept: FAMILY MEDICINE CLINIC | Facility: CLINIC | Age: 65
End: 2020-09-16
Payer: MEDICARE

## 2020-09-16 VITALS
RESPIRATION RATE: 18 BRPM | WEIGHT: 133 LBS | DIASTOLIC BLOOD PRESSURE: 68 MMHG | TEMPERATURE: 96.8 F | HEART RATE: 63 BPM | BODY MASS INDEX: 24.48 KG/M2 | HEIGHT: 62 IN | OXYGEN SATURATION: 98 % | SYSTOLIC BLOOD PRESSURE: 122 MMHG

## 2020-09-16 DIAGNOSIS — E78.2 HYPERLIPIDEMIA, MIXED: ICD-10-CM

## 2020-09-16 DIAGNOSIS — F41.9 ANXIETY: ICD-10-CM

## 2020-09-16 DIAGNOSIS — F41.9 ANXIETY: Primary | ICD-10-CM

## 2020-09-16 LAB
ALBUMIN SERPL BCP-MCNC: 3.9 G/DL (ref 3.5–5)
ALP SERPL-CCNC: 72 U/L (ref 46–116)
ALT SERPL W P-5'-P-CCNC: 18 U/L (ref 12–78)
ANION GAP SERPL CALCULATED.3IONS-SCNC: 7 MMOL/L (ref 4–13)
AST SERPL W P-5'-P-CCNC: 19 U/L (ref 5–45)
BILIRUB SERPL-MCNC: 0.48 MG/DL (ref 0.2–1)
BUN SERPL-MCNC: 14 MG/DL (ref 5–25)
CALCIUM SERPL-MCNC: 9.4 MG/DL (ref 8.3–10.1)
CHLORIDE SERPL-SCNC: 107 MMOL/L (ref 100–108)
CHOLEST SERPL-MCNC: 247 MG/DL (ref 50–200)
CO2 SERPL-SCNC: 27 MMOL/L (ref 21–32)
CREAT SERPL-MCNC: 0.52 MG/DL (ref 0.6–1.3)
GFR SERPL CREATININE-BSD FRML MDRD: 99 ML/MIN/1.73SQ M
GLUCOSE P FAST SERPL-MCNC: 71 MG/DL (ref 65–99)
HDLC SERPL-MCNC: 74 MG/DL
LDLC SERPL CALC-MCNC: 158 MG/DL (ref 0–100)
NONHDLC SERPL-MCNC: 173 MG/DL
POTASSIUM SERPL-SCNC: 4.5 MMOL/L (ref 3.5–5.3)
PROT SERPL-MCNC: 7 G/DL (ref 6.4–8.2)
SODIUM SERPL-SCNC: 141 MMOL/L (ref 136–145)
TRIGL SERPL-MCNC: 73 MG/DL

## 2020-09-16 PROCEDURE — 80061 LIPID PANEL: CPT

## 2020-09-16 PROCEDURE — 36415 COLL VENOUS BLD VENIPUNCTURE: CPT

## 2020-09-16 PROCEDURE — 80053 COMPREHEN METABOLIC PANEL: CPT

## 2020-09-16 PROCEDURE — 99213 OFFICE O/P EST LOW 20 MIN: CPT | Performed by: NURSE PRACTITIONER

## 2020-09-16 RX ORDER — ESCITALOPRAM OXALATE 10 MG/1
10 TABLET ORAL DAILY
Qty: 90 TABLET | Refills: 1 | Status: SHIPPED | OUTPATIENT
Start: 2020-09-16 | End: 2021-03-17 | Stop reason: SDUPTHER

## 2020-09-16 RX ORDER — ROSUVASTATIN CALCIUM 5 MG/1
5 TABLET, COATED ORAL DAILY
Qty: 90 TABLET | Refills: 3 | Status: SHIPPED | OUTPATIENT
Start: 2020-09-16 | End: 2021-03-17 | Stop reason: SDUPTHER

## 2020-09-16 NOTE — PATIENT INSTRUCTIONS
Follow up    Anxiety- very stable  Refill lexapro  Hyperlipidemia- ran out of simvastatin  Start Rosuvastatin  Discussed her risk of ASCVD is moderate and I highly recommend she stay on statin therapy with an LDL goal of   Obtain lipid panel    Follow up in 6 months

## 2021-03-10 DIAGNOSIS — Z23 ENCOUNTER FOR IMMUNIZATION: ICD-10-CM

## 2021-03-17 ENCOUNTER — OFFICE VISIT (OUTPATIENT)
Dept: FAMILY MEDICINE CLINIC | Facility: CLINIC | Age: 66
End: 2021-03-17
Payer: MEDICARE

## 2021-03-17 ENCOUNTER — APPOINTMENT (OUTPATIENT)
Dept: LAB | Facility: CLINIC | Age: 66
End: 2021-03-17
Payer: MEDICARE

## 2021-03-17 VITALS
DIASTOLIC BLOOD PRESSURE: 78 MMHG | BODY MASS INDEX: 24.29 KG/M2 | TEMPERATURE: 98.1 F | WEIGHT: 132 LBS | HEIGHT: 62 IN | SYSTOLIC BLOOD PRESSURE: 136 MMHG

## 2021-03-17 DIAGNOSIS — Z12.31 ENCOUNTER FOR SCREENING MAMMOGRAM FOR MALIGNANT NEOPLASM OF BREAST: ICD-10-CM

## 2021-03-17 DIAGNOSIS — Z12.12 SCREENING FOR COLORECTAL CANCER: ICD-10-CM

## 2021-03-17 DIAGNOSIS — Z12.11 SCREENING FOR COLON CANCER: ICD-10-CM

## 2021-03-17 DIAGNOSIS — F41.9 ANXIETY: ICD-10-CM

## 2021-03-17 DIAGNOSIS — Z13.1 SCREENING FOR DIABETES MELLITUS: ICD-10-CM

## 2021-03-17 DIAGNOSIS — Z11.59 NEED FOR HEPATITIS C SCREENING TEST: ICD-10-CM

## 2021-03-17 DIAGNOSIS — Z12.31 SCREENING MAMMOGRAM, ENCOUNTER FOR: ICD-10-CM

## 2021-03-17 DIAGNOSIS — Z23 NEED FOR PNEUMOCOCCAL VACCINATION: ICD-10-CM

## 2021-03-17 DIAGNOSIS — E78.5 HYPERLIPIDEMIA, UNSPECIFIED HYPERLIPIDEMIA TYPE: ICD-10-CM

## 2021-03-17 DIAGNOSIS — Z12.11 SCREENING FOR COLORECTAL CANCER: ICD-10-CM

## 2021-03-17 DIAGNOSIS — E78.2 HYPERLIPIDEMIA, MIXED: Primary | ICD-10-CM

## 2021-03-17 LAB
ALBUMIN SERPL BCP-MCNC: 4.2 G/DL (ref 3.5–5)
ALP SERPL-CCNC: 87 U/L (ref 46–116)
ALT SERPL W P-5'-P-CCNC: 19 U/L (ref 12–78)
ANION GAP SERPL CALCULATED.3IONS-SCNC: 3 MMOL/L (ref 4–13)
AST SERPL W P-5'-P-CCNC: 18 U/L (ref 5–45)
BILIRUB SERPL-MCNC: 0.47 MG/DL (ref 0.2–1)
BUN SERPL-MCNC: 17 MG/DL (ref 5–25)
CALCIUM SERPL-MCNC: 9.4 MG/DL (ref 8.3–10.1)
CHLORIDE SERPL-SCNC: 106 MMOL/L (ref 100–108)
CHOLEST SERPL-MCNC: 171 MG/DL (ref 50–200)
CO2 SERPL-SCNC: 29 MMOL/L (ref 21–32)
CREAT SERPL-MCNC: 0.64 MG/DL (ref 0.6–1.3)
GFR SERPL CREATININE-BSD FRML MDRD: 92 ML/MIN/1.73SQ M
GLUCOSE P FAST SERPL-MCNC: 85 MG/DL (ref 65–99)
HCV AB SER QL: NORMAL
HDLC SERPL-MCNC: 74 MG/DL
LDLC SERPL CALC-MCNC: 84 MG/DL (ref 0–100)
NONHDLC SERPL-MCNC: 97 MG/DL
POTASSIUM SERPL-SCNC: 4.1 MMOL/L (ref 3.5–5.3)
PROT SERPL-MCNC: 7 G/DL (ref 6.4–8.2)
SODIUM SERPL-SCNC: 138 MMOL/L (ref 136–145)
TRIGL SERPL-MCNC: 67 MG/DL

## 2021-03-17 PROCEDURE — G0009 ADMIN PNEUMOCOCCAL VACCINE: HCPCS

## 2021-03-17 PROCEDURE — 80053 COMPREHEN METABOLIC PANEL: CPT

## 2021-03-17 PROCEDURE — 86803 HEPATITIS C AB TEST: CPT

## 2021-03-17 PROCEDURE — 80061 LIPID PANEL: CPT

## 2021-03-17 PROCEDURE — 90732 PPSV23 VACC 2 YRS+ SUBQ/IM: CPT

## 2021-03-17 PROCEDURE — 99212 OFFICE O/P EST SF 10 MIN: CPT | Performed by: FAMILY MEDICINE

## 2021-03-17 PROCEDURE — 36415 COLL VENOUS BLD VENIPUNCTURE: CPT

## 2021-03-17 RX ORDER — ROSUVASTATIN CALCIUM 5 MG/1
5 TABLET, COATED ORAL DAILY
Qty: 90 TABLET | Refills: 3 | Status: SHIPPED | OUTPATIENT
Start: 2021-03-17 | End: 2022-03-17 | Stop reason: SDUPTHER

## 2021-03-17 RX ORDER — ESCITALOPRAM OXALATE 10 MG/1
10 TABLET ORAL DAILY
Qty: 90 TABLET | Refills: 3 | Status: SHIPPED | OUTPATIENT
Start: 2021-03-17 | End: 2022-03-17 | Stop reason: SDUPTHER

## 2021-03-17 NOTE — PROGRESS NOTES
Assessment/Plan:    No problem-specific Assessment & Plan notes found for this encounter  Diagnoses and all orders for this visit:    Hyperlipidemia, mixed  -     rosuvastatin (CRESTOR) 5 mg tablet; Take 1 tablet (5 mg total) by mouth daily    Need for hepatitis C screening test  -     Hepatitis C Antibody ([facility]); Future    Screening for colorectal cancer  -     Ambulatory referral to Gastroenterology; Future    Anxiety  -     escitalopram (LEXAPRO) 10 mg tablet; Take 1 tablet (10 mg total) by mouth daily    Screening mammogram, encounter for    Encounter for screening mammogram for malignant neoplasm of breast  -     Mammo screening bilateral w cad; Future    Screening for colon cancer  -     Cologuard; Future    Hyperlipidemia, unspecified hyperlipidemia type  -     Lipid panel; Future    Screening for diabetes mellitus  -     Comprehensive metabolic panel; Future      Follow up in 1 year or as needed    Subjective:      Patient ID: Lsia Billingsley is a 74 y o  female  Patient has hyperlipidemia and has been taking Crestor daily denies any side effect from the medication  Also has anxiety and takes expo daily denies any side effects from medication  The following portions of the patient's history were reviewed and updated as appropriate: She  has a past medical history of Hypercholesterolemia  She   Patient Active Problem List    Diagnosis Date Noted    Hyperlipidemia, mixed 03/17/2021    Anxiety 03/17/2021    Low back pain 10/18/2018    Mosquito bite 10/18/2018    Arthralgia 10/18/2018    Fever and chills 10/18/2018    Situational anxiety 08/17/2018    Screening mammogram, encounter for 07/06/2018     She  has a past surgical history that includes Colonoscopy and Dilation and curettage of uterus  Her family history includes Arthritis in her family; Brain cancer in her father; Breast cancer in her cousin; Hyperlipidemia in her family;  Hypertension in her family; Lung cancer in her father  She  reports that she has never smoked  She has never used smokeless tobacco  She reports that she does not drink alcohol or use drugs  Current Outpatient Medications   Medication Sig Dispense Refill    escitalopram (LEXAPRO) 10 mg tablet Take 1 tablet (10 mg total) by mouth daily 90 tablet 3    fluticasone (FLONASE) 50 mcg/act nasal spray 1 spray into each nostril daily 1 Bottle 1    rosuvastatin (CRESTOR) 5 mg tablet Take 1 tablet (5 mg total) by mouth daily 90 tablet 3     No current facility-administered medications for this visit  Current Outpatient Medications on File Prior to Visit   Medication Sig    fluticasone (FLONASE) 50 mcg/act nasal spray 1 spray into each nostril daily    [DISCONTINUED] escitalopram (LEXAPRO) 10 mg tablet Take 1 tablet (10 mg total) by mouth daily    [DISCONTINUED] rosuvastatin (CRESTOR) 5 mg tablet Take 1 tablet (5 mg total) by mouth daily     No current facility-administered medications on file prior to visit  She is allergic to acetazolamide and sulfa antibiotics       Review of Systems   Constitutional: Negative for activity change, appetite change, fatigue and fever  HENT: Negative for congestion and ear discharge  Respiratory: Negative for cough and shortness of breath  Cardiovascular: Negative for chest pain and palpitations  Gastrointestinal: Negative for diarrhea and nausea  Musculoskeletal: Negative for arthralgias and back pain  Skin: Negative for color change and rash  Neurological: Negative for dizziness and headaches  Psychiatric/Behavioral: Negative for agitation and behavioral problems  Objective:      /78   Temp 98 1 °F (36 7 °C)   Ht 5' 1 5" (1 562 m)   Wt 59 9 kg (132 lb)   BMI 24 54 kg/m²          Physical Exam  Constitutional:       General: She is not in acute distress  Appearance: She is well-developed  She is not diaphoretic  HENT:      Head: Normocephalic and atraumatic        Nose: Nose normal    Eyes:      Conjunctiva/sclera: Conjunctivae normal       Pupils: Pupils are equal, round, and reactive to light  Cardiovascular:      Rate and Rhythm: Normal rate and regular rhythm  Heart sounds: Normal heart sounds  No murmur  Pulmonary:      Effort: Pulmonary effort is normal  No respiratory distress  Breath sounds: Normal breath sounds  No wheezing  Abdominal:      General: Bowel sounds are normal  There is no distension  Palpations: Abdomen is soft  Tenderness: There is no abdominal tenderness  Skin:     General: Skin is warm and dry  Findings: No erythema or rash  Neurological:      Mental Status: She is alert and oriented to person, place, and time

## 2021-12-01 ENCOUNTER — TELEPHONE (OUTPATIENT)
Dept: FAMILY MEDICINE CLINIC | Facility: CLINIC | Age: 66
End: 2021-12-01
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
## 2021-12-01 PROBLEM — R50.9 FEVER AND CHILLS: Status: RESOLVED | Noted: 2018-10-18 | Resolved: 2021-12-01

## 2021-12-01 PROBLEM — M54.50 LOW BACK PAIN: Status: RESOLVED | Noted: 2018-10-18 | Resolved: 2021-12-01

## 2022-03-17 ENCOUNTER — APPOINTMENT (OUTPATIENT)
Dept: LAB | Facility: CLINIC | Age: 67
End: 2022-03-17
Payer: MEDICARE

## 2022-03-17 ENCOUNTER — OFFICE VISIT (OUTPATIENT)
Dept: FAMILY MEDICINE CLINIC | Facility: CLINIC | Age: 67
End: 2022-03-17
Payer: MEDICARE

## 2022-03-17 VITALS
HEIGHT: 62 IN | OXYGEN SATURATION: 98 % | HEART RATE: 72 BPM | WEIGHT: 131 LBS | TEMPERATURE: 96.3 F | BODY MASS INDEX: 24.11 KG/M2 | DIASTOLIC BLOOD PRESSURE: 82 MMHG | SYSTOLIC BLOOD PRESSURE: 134 MMHG

## 2022-03-17 DIAGNOSIS — Z12.11 COLON CANCER SCREENING: ICD-10-CM

## 2022-03-17 DIAGNOSIS — E78.2 HYPERLIPIDEMIA, MIXED: ICD-10-CM

## 2022-03-17 DIAGNOSIS — Z00.00 MEDICARE ANNUAL WELLNESS VISIT, INITIAL: Primary | ICD-10-CM

## 2022-03-17 DIAGNOSIS — Z12.31 VISIT FOR SCREENING MAMMOGRAM: ICD-10-CM

## 2022-03-17 DIAGNOSIS — F41.9 ANXIETY: ICD-10-CM

## 2022-03-17 LAB
ALBUMIN SERPL BCP-MCNC: 3.9 G/DL (ref 3.5–5)
ALP SERPL-CCNC: 66 U/L (ref 46–116)
ALT SERPL W P-5'-P-CCNC: 20 U/L (ref 12–78)
ANION GAP SERPL CALCULATED.3IONS-SCNC: 5 MMOL/L (ref 4–13)
AST SERPL W P-5'-P-CCNC: 20 U/L (ref 5–45)
BILIRUB SERPL-MCNC: 0.46 MG/DL (ref 0.2–1)
BUN SERPL-MCNC: 15 MG/DL (ref 5–25)
CALCIUM SERPL-MCNC: 9.2 MG/DL (ref 8.3–10.1)
CHLORIDE SERPL-SCNC: 107 MMOL/L (ref 100–108)
CHOLEST SERPL-MCNC: 178 MG/DL
CO2 SERPL-SCNC: 28 MMOL/L (ref 21–32)
CREAT SERPL-MCNC: 0.63 MG/DL (ref 0.6–1.3)
GFR SERPL CREATININE-BSD FRML MDRD: 91 ML/MIN/1.73SQ M
GLUCOSE P FAST SERPL-MCNC: 75 MG/DL (ref 65–99)
HDLC SERPL-MCNC: 67 MG/DL
LDLC SERPL CALC-MCNC: 95 MG/DL (ref 0–100)
POTASSIUM SERPL-SCNC: 4.1 MMOL/L (ref 3.5–5.3)
PROT SERPL-MCNC: 6.8 G/DL (ref 6.4–8.2)
SODIUM SERPL-SCNC: 140 MMOL/L (ref 136–145)
TRIGL SERPL-MCNC: 79 MG/DL

## 2022-03-17 PROCEDURE — 1123F ACP DISCUSS/DSCN MKR DOCD: CPT | Performed by: FAMILY MEDICINE

## 2022-03-17 PROCEDURE — 36415 COLL VENOUS BLD VENIPUNCTURE: CPT

## 2022-03-17 PROCEDURE — 80061 LIPID PANEL: CPT

## 2022-03-17 PROCEDURE — G0438 PPPS, INITIAL VISIT: HCPCS | Performed by: FAMILY MEDICINE

## 2022-03-17 PROCEDURE — 80053 COMPREHEN METABOLIC PANEL: CPT

## 2022-03-17 RX ORDER — ESCITALOPRAM OXALATE 10 MG/1
10 TABLET ORAL DAILY
Qty: 90 TABLET | Refills: 3 | Status: SHIPPED | OUTPATIENT
Start: 2022-03-17

## 2022-03-17 RX ORDER — ROSUVASTATIN CALCIUM 5 MG/1
5 TABLET, COATED ORAL DAILY
Qty: 90 TABLET | Refills: 3 | Status: SHIPPED | OUTPATIENT
Start: 2022-03-17

## 2022-03-17 NOTE — PATIENT INSTRUCTIONS
Medicare Preventive Visit Patient Instructions  Thank you for completing your Welcome to Medicare Visit or Medicare Annual Wellness Visit today  Your next wellness visit will be due in one year (3/18/2023)  The screening/preventive services that you may require over the next 5-10 years are detailed below  Some tests may not apply to you based off risk factors and/or age  Screening tests ordered at today's visit but not completed yet may show as past due  Also, please note that scanned in results may not display below  Preventive Screenings:  Service Recommendations Previous Testing/Comments   Colorectal Cancer Screening  * Colonoscopy    * Fecal Occult Blood Test (FOBT)/Fecal Immunochemical Test (FIT)  * Fecal DNA/Cologuard Test  * Flexible Sigmoidoscopy Age: 54-65 years old   Colonoscopy: every 10 years (may be performed more frequently if at higher risk)  OR  FOBT/FIT: every 1 year  OR  Cologuard: every 3 years  OR  Sigmoidoscopy: every 5 years  Screening may be recommended earlier than age 48 if at higher risk for colorectal cancer  Also, an individualized decision between you and your healthcare provider will decide whether screening between the ages of 74-80 would be appropriate  Colonoscopy: Not on file  FOBT/FIT: Not on file  Cologuard: Not on file  Sigmoidoscopy: Not on file          Breast Cancer Screening Age: 36 years old  Frequency: every 1-2 years  Not required if history of left and right mastectomy Mammogram: 08/15/2019        Cervical Cancer Screening Between the ages of 21-29, pap smear recommended once every 3 years  Between the ages of 33-67, can perform pap smear with HPV co-testing every 5 years     Recommendations may differ for women with a history of total hysterectomy, cervical cancer, or abnormal pap smears in past  Pap Smear: Not on file    Screening Not Indicated   Hepatitis C Screening Once for adults born [facility]  More frequently in patients at high risk for Hepatitis C Hep C Antibody: 03/17/2021    Screening Current   Diabetes Screening 1-2 times per year if you're at risk for diabetes or have pre-diabetes Fasting glucose: 85 mg/dL   A1C: 5 8 %    Screening Current   Cholesterol Screening Once every 5 years if you don't have a lipid disorder  May order more often based on risk factors  Lipid panel: 03/17/2021    Screening Not Indicated  History Lipid Disorder     Other Preventive Screenings Covered by Medicare:  1  Abdominal Aortic Aneurysm (AAA) Screening: covered once if your at risk  You're considered to be at risk if you have a family history of AAA  2  Lung Cancer Screening: covers low dose CT scan once per year if you meet all of the following conditions: (1) Age 50-69; (2) No signs or symptoms of lung cancer; (3) Current smoker or have quit smoking within the last 15 years; (4) You have a tobacco smoking history of at least 30 pack years (packs per day multiplied by number of years you smoked); (5) You get a written order from a healthcare provider  3  Glaucoma Screening: covered annually if you're considered high risk: (1) You have diabetes OR (2) Family history of glaucoma OR (3)  aged 48 and older OR (3)  American aged 72 and older  3  Osteoporosis Screening: covered every 2 years if you meet one of the following conditions: (1) You're estrogen deficient and at risk for osteoporosis based off medical history and other findings; (2) Have a vertebral abnormality; (3) On glucocorticoid therapy for more than 3 months; (4) Have primary hyperparathyroidism; (5) On osteoporosis medications and need to assess response to drug therapy  · Last bone density test (DXA Scan): Not on file  5  HIV Screening: covered annually if you're between the age of 12-76  Also covered annually if you are younger than 13 and older than 72 with risk factors for HIV infection   For pregnant patients, it is covered up to 3 times per pregnancy  Immunizations:  Immunization Recommendations   Influenza Vaccine Annual influenza vaccination during flu season is recommended for all persons aged >= 6 months who do not have contraindications   Pneumococcal Vaccine (Prevnar and Pneumovax)  * Prevnar = PCV13  * Pneumovax = PPSV23   Adults 25-60 years old: 1-3 doses may be recommended based on certain risk factors  Adults 72 years old: Prevnar (PCV13) vaccine recommended followed by Pneumovax (PPSV23) vaccine  If already received PPSV23 since turning 65, then PCV13 recommended at least one year after PPSV23 dose  Hepatitis B Vaccine 3 dose series if at intermediate or high risk (ex: diabetes, end stage renal disease, liver disease)   Tetanus (Td) Vaccine - COST NOT COVERED BY MEDICARE PART B Following completion of primary series, a booster dose should be given every 10 years to maintain immunity against tetanus  Td may also be given as tetanus wound prophylaxis  Tdap Vaccine - COST NOT COVERED BY MEDICARE PART B Recommended at least once for all adults  For pregnant patients, recommended with each pregnancy  Shingles Vaccine (Shingrix) - COST NOT COVERED BY MEDICARE PART B  2 shot series recommended in those aged 48 and above     Health Maintenance Due:      Topic Date Due    Colorectal Cancer Screening  Never done    Breast Cancer Screening: Mammogram  03/17/2022 (Originally 8/15/2020)    Hepatitis C Screening  Completed     Immunizations Due:      Topic Date Due    DTaP,Tdap,and Td Vaccines (2 - Tdap) 10/12/2019     Advance Directives   What are advance directives? Advance directives are legal documents that state your wishes and plans for medical care  These plans are made ahead of time in case you lose your ability to make decisions for yourself  Advance directives can apply to any medical decision, such as the treatments you want, and if you want to donate organs  What are the types of advance directives?   There are many types of advance directives, and each state has rules about how to use them  You may choose a combination of any of the following:  · Living will: This is a written record of the treatment you want  You can also choose which treatments you do not want, which to limit, and which to stop at a certain time  This includes surgery, medicine, IV fluid, and tube feedings  · Durable power of  for healthcare [facility]): This is a written record that states who you want to make healthcare choices for you when you are unable to make them for yourself  This person, called a proxy, is usually a family member or a friend  You may choose more than 1 proxy  · Do not resuscitate (DNR) order:  A DNR order is used in case your heart stops beating or you stop breathing  It is a request not to have certain forms of treatment, such as CPR  A DNR order may be included in other types of advance directives  · Medical directive: This covers the care that you want if you are in a coma, near death, or unable to make decisions for yourself  You can list the treatments you want for each condition  Treatment may include pain medicine, surgery, blood transfusions, dialysis, IV or tube feedings, and a ventilator (breathing machine)  · Values history: This document has questions about your views, beliefs, and how you feel and think about life  This information can help others choose the care that you would choose  Why are advance directives important? An advance directive helps you control your care  Although spoken wishes may be used, it is better to have your wishes written down  Spoken wishes can be misunderstood, or not followed  Treatments may be given even if you do not want them  An advance directive may make it easier for your family to make difficult choices about your care  © Copyright [employer] 2018 Information is for End User's use only and may not be sold, redistributed or otherwise used for commercial purposes   All illustrations and images included in CareNotes® are the copyrighted property of A D A M [employer]  or [employer]iscoll

## 2022-03-17 NOTE — PROGRESS NOTES
Assessment and Plan:     Problem List Items Addressed This Visit        Other    Hyperlipidemia, mixed    Relevant Medications    rosuvastatin (CRESTOR) 5 mg tablet    Anxiety    Relevant Medications    escitalopram (LEXAPRO) 10 mg tablet      Other Visit Diagnoses     Medicare annual wellness visit, initial    -  Primary    Visit for screening mammogram        Relevant Orders    Mammo screening bilateral w cad    Colon cancer screening        Relevant Orders    Cologuard        BMI Counseling: Body mass index is 24 35 kg/m²  The BMI is above normal  Exercise recommendations include exercising 3-5 times per week  Depression Screening and Follow-up Plan: Patient was screened for depression during today's encounter  They screened negative with a PHQ-2 score of 0  Nutrition and Exercise Counseling: The patient's Body mass index is 24 35 kg/m²  This is Facility age limit for growth percentiles is 20 years  Nutrition counseling provided:  Reviewed long term health goals and risks of obesity  Exercise counseling provided:          Preventive health issues were discussed with patient, and age appropriate screening tests were ordered as noted in patient's After Visit Summary  Personalized health advice and appropriate referrals for health education or preventive services given if needed, as noted in patient's After Visit Summary  History of Present Illness:     Patient presents for initial Medicare visit  No acute complaints  She is on Crestor, Lexapro doing well  Needs refills  Labs are due  Patient Care Team:  Mahesh Rojas MD as PCP - General  WENDY Navarro     Review of Systems:     Review of Systems   Constitutional: Negative for activity change, appetite change, chills, fatigue, fever and unexpected weight change  HENT: Negative for congestion, ear discharge, ear pain, postnasal drip, sinus pressure and sore throat  Eyes: Negative for discharge and visual disturbance  Respiratory: Negative for cough, shortness of breath and wheezing  Cardiovascular: Negative for chest pain, palpitations and leg swelling  Gastrointestinal: Negative for abdominal pain, constipation, diarrhea, nausea and vomiting  Endocrine: Negative for cold intolerance, heat intolerance, polydipsia and polyuria  Genitourinary: Negative for difficulty urinating and frequency  Musculoskeletal: Negative for arthralgias, back pain, joint swelling and myalgias  Skin: Negative for rash  Neurological: Negative for dizziness, weakness, light-headedness, numbness and headaches  Hematological: Negative for adenopathy  Psychiatric/Behavioral: Negative for behavioral problems, confusion, dysphoric mood, sleep disturbance and suicidal ideas  The patient is not nervous/anxious         Problem List:     Patient Active Problem List   Diagnosis    Screening mammogram, encounter for    Situational anxiety    Arthralgia    Hyperlipidemia, mixed    Anxiety      Past Medical and Surgical History:     Past Medical History:   Diagnosis Date    Hypercholesterolemia      Past Surgical History:   Procedure Laterality Date    COLONOSCOPY      complete     DILATION AND CURETTAGE OF UTERUS      x2       Family History:     Family History   Problem Relation Age of Onset    Lung cancer Father     Brain cancer Father     Arthritis Family     Hypertension Family         essential      Hyperlipidemia Family     Breast cancer Cousin     Endometrial cancer Neg Hx     Ovarian cancer Neg Hx     Colon cancer Neg Hx       Social History:     Social History     Socioeconomic History    Marital status:      Spouse name: None    Number of children: None    Years of education: None    Highest education level: None   Occupational History    None   Tobacco Use    Smoking status: Never Smoker    Smokeless tobacco: Never Used   Substance and Sexual Activity    Alcohol use: No    Drug use: No    Sexual activity: None   Other Topics Concern    None   Social History Narrative    None     Social Determinants of Health     Financial Resource Strain: Not on file   Food Insecurity: Not on file   Transportation Needs: Not on file   Physical Activity: Not on file   Stress: Not on file   Social Connections: Not on file   Intimate Partner Violence: Not on file   Housing Stability: Not on file      Medications and Allergies:     Current Outpatient Medications   Medication Sig Dispense Refill    escitalopram (LEXAPRO) 10 mg tablet Take 1 tablet (10 mg total) by mouth daily 90 tablet 3    rosuvastatin (CRESTOR) 5 mg tablet Take 1 tablet (5 mg total) by mouth daily 90 tablet 3     No current facility-administered medications for this visit  Allergies   Allergen Reactions    Acetazolamide     Sulfa Antibiotics       Immunizations:     Immunization History   Administered Date(s) Administered    COVID-19 MODERNA VACC 0 5 ML IM 03/25/2021, 04/22/2021, 12/10/2021    DTaP 5 10/12/2009    INFLUENZA 10/27/2014, 10/29/2018, 09/16/2021    Influenza Quadrivalent Preservative Free 3 years and older IM 10/27/2014, 10/01/2015, 10/20/2017    Influenza Split High Dose Preservative Free IM 10/29/2018    Influenza, seasonal, injectable 10/24/2016    Pneumococcal Conjugate 13-Valent 11/12/2018    Pneumococcal Polysaccharide PPV23 03/17/2021    Zoster 07/02/2013      Health Maintenance:         Topic Date Due    Colorectal Cancer Screening  Never done    Breast Cancer Screening: Mammogram  03/17/2022 (Originally 8/15/2020)    Hepatitis C Screening  Completed         Topic Date Due    DTaP,Tdap,and Td Vaccines (2 - Tdap) 10/12/2019      Medicare Screening Tests and Risk Assessments:     Nito Koenig is here for her Initial Wellness visit  Health Risk Assessment:   Patient rates overall health as very good  Patient feels that their physical health rating is same  Patient is very satisfied with their life   Eyesight was rated as same  Hearing was rated as same  Patient feels that their emotional and mental health rating is same  Patients states they are never, rarely angry  Patient states they are never, rarely unusually tired/fatigued  Pain experienced in the last 7 days has been none  Patient states that she has experienced no weight loss or gain in last 6 months  Depression Screening:   PHQ-2 Score: 0      Fall Risk Screening: In the past year, patient has experienced: no history of falling in past year      Urinary Incontinence Screening:   Patient has not leaked urine accidently in the last six months  Home Safety:  Patient does not have trouble with stairs inside or outside of their home  Patient has working smoke alarms and has working carbon monoxide detector  Home safety hazards include: none  Nutrition:   Current diet is Regular  BMI Counseling: @BMI@ The BMI is above normal  Nutrition recommendations include 3-5 servings of fruits/vegetables daily, consuming healthier snacks and moderation in carbohydrate intake  Exercise recommendations include exercising 3-5 times per week  Medications:   Patient is currently taking over-the-counter supplements  OTC medications include: see medication list  Patient is able to manage medications  Activities of Daily Living (ADLs)/Instrumental Activities of Daily Living (IADLs):   Walk and transfer into and out of bed and chair?: Yes  Dress and groom yourself?: Yes    Bathe or shower yourself?: Yes    Feed yourself?  Yes  Do your laundry/housekeeping?: Yes  Manage your money, pay your bills and track your expenses?: Yes  Make your own meals?: Yes    Do your own shopping?: Yes    Previous Hospitalizations:   Any hospitalizations or ED visits within the last 12 months?: No      Advance Care Planning:   Living will: No    Durable POA for healthcare: No    Advanced directive: No      Cognitive Screening:   Provider or family/friend/caregiver concerned regarding cognition?: No    PREVENTIVE SCREENINGS      Cardiovascular Screening:    General: Screening Not Indicated, History Lipid Disorder and Risks and Benefits Discussed    Due for: Lipid Panel      Diabetes Screening:     General: Screening Current and Risks and Benefits Discussed    Due for: Blood Glucose      Colorectal Cancer Screening:     General: Risks and Benefits Discussed    Due for: Cologuard      Breast Cancer Screening:     General: Risks and Benefits Discussed    Due for: Mammogram        Cervical Cancer Screening:    General: Screening Not Indicated      Osteoporosis Screening:    General: Risks and Benefits Discussed and Patient Declines      Lung Cancer Screening:     General: Screening Not Indicated      Hepatitis C Screening:    General: Screening Current    Screening, Brief Intervention, and Referral to Treatment (SBIRT)    Screening  Typical number of drinks in a day: 0  Typical number of drinks in a week: 2  Interpretation: Low risk drinking behavior  Single Item Drug Screening:  How often have you used an illegal drug (including marijuana) or a prescription medication for non-medical reasons in the past year? never    Single Item Drug Screen Score: 0  Interpretation: Negative screen for possible drug use disorder    Brief Intervention  Alcohol & drug use screenings were reviewed  No concerns regarding substance use disorder identified  Other Counseling Topics:   Regular weightbearing exercise and calcium and vitamin D intake  No exam data present     Physical Exam:     /82   Pulse 72   Temp (!) 96 3 °F (35 7 °C)   Ht 5' 1 5" (1 562 m)   Wt 59 4 kg (131 lb)   SpO2 98%   BMI 24 35 kg/m²     Physical Exam  Constitutional:       General: She is not in acute distress  Appearance: Normal appearance  She is well-developed  She is not ill-appearing, toxic-appearing or diaphoretic  HENT:      Head: Normocephalic and atraumatic        Right Ear: Tympanic membrane, ear canal and external ear normal       Left Ear: Tympanic membrane, ear canal and external ear normal       Mouth/Throat:      Mouth: Mucous membranes are moist       Pharynx: Oropharynx is clear  No oropharyngeal exudate  Eyes:      General: No scleral icterus  Right eye: No discharge  Left eye: No discharge  Conjunctiva/sclera: Conjunctivae normal       Pupils: Pupils are equal, round, and reactive to light  Neck:      Thyroid: No thyromegaly  Cardiovascular:      Rate and Rhythm: Normal rate and regular rhythm  Heart sounds: Normal heart sounds  No murmur heard  No friction rub  No gallop  Pulmonary:      Effort: Pulmonary effort is normal  No respiratory distress  Breath sounds: Normal breath sounds  No wheezing or rales  Chest:      Chest wall: No tenderness  Abdominal:      General: Bowel sounds are normal  There is no distension  Palpations: Abdomen is soft  There is no mass  Tenderness: There is no abdominal tenderness  There is no guarding or rebound  Hernia: No hernia is present  Musculoskeletal:         General: No swelling or tenderness  Right lower leg: No edema  Left lower leg: No edema  Lymphadenopathy:      Cervical: No cervical adenopathy  Skin:     General: Skin is warm  Findings: No rash  Neurological:      General: No focal deficit present  Mental Status: She is alert and oriented to person, place, and time  Mental status is at baseline  Cranial Nerves: No cranial nerve deficit  Psychiatric:         Mood and Affect: Mood normal          Behavior: Behavior normal          Thought Content:  Thought content normal          Judgment: Judgment normal           Magali Mariano MD

## 2022-06-26 ENCOUNTER — APPOINTMENT (EMERGENCY)
Dept: CT IMAGING | Facility: HOSPITAL | Age: 67
End: 2022-06-26
Payer: MEDICARE

## 2022-06-26 ENCOUNTER — HOSPITAL ENCOUNTER (EMERGENCY)
Facility: HOSPITAL | Age: 67
Discharge: HOME | End: 2022-06-26
Attending: EMERGENCY MEDICINE
Payer: MEDICARE

## 2022-06-26 VITALS
SYSTOLIC BLOOD PRESSURE: 122 MMHG | RESPIRATION RATE: 16 BRPM | DIASTOLIC BLOOD PRESSURE: 58 MMHG | OXYGEN SATURATION: 99 % | HEART RATE: 73 BPM | TEMPERATURE: 98.3 F

## 2022-06-26 DIAGNOSIS — N30.90 BLADDER INFECTION: ICD-10-CM

## 2022-06-26 DIAGNOSIS — R31.9 HEMATURIA: Primary | ICD-10-CM

## 2022-06-26 LAB
ANION GAP SERPL CALCULATED.3IONS-SCNC: 10 MMOL/L (ref 4–13)
BACTERIA UR QL AUTO: ABNORMAL /HPF
BASOPHILS # BLD AUTO: 0.03 THOUSANDS/ΜL (ref 0–0.1)
BASOPHILS NFR BLD AUTO: 0 % (ref 0–1)
BILIRUB UR QL STRIP: NEGATIVE
BUN SERPL-MCNC: 15 MG/DL (ref 5–25)
CALCIUM SERPL-MCNC: 9 MG/DL (ref 8.3–10.1)
CHLORIDE SERPL-SCNC: 106 MMOL/L (ref 100–108)
CLARITY UR: ABNORMAL
CO2 SERPL-SCNC: 27 MMOL/L (ref 21–32)
COLOR UR: ABNORMAL
CREAT SERPL-MCNC: 0.62 MG/DL (ref 0.6–1.3)
EOSINOPHIL # BLD AUTO: 0.16 THOUSAND/ΜL (ref 0–0.61)
EOSINOPHIL NFR BLD AUTO: 2 % (ref 0–6)
ERYTHROCYTE [DISTWIDTH] IN BLOOD BY AUTOMATED COUNT: 13.4 % (ref 11.6–15.1)
GFR SERPL CREATININE-BSD FRML MDRD: 92 ML/MIN/1.73SQ M
GLUCOSE SERPL-MCNC: 96 MG/DL (ref 65–140)
GLUCOSE UR STRIP-MCNC: NEGATIVE MG/DL
HCT VFR BLD AUTO: 42.7 % (ref 34.8–46.1)
HGB BLD-MCNC: 13.3 G/DL (ref 11.5–15.4)
HGB UR QL STRIP.AUTO: ABNORMAL
IMM GRANULOCYTES # BLD AUTO: 0.02 THOUSAND/UL (ref 0–0.2)
IMM GRANULOCYTES NFR BLD AUTO: 0 % (ref 0–2)
KETONES UR STRIP-MCNC: NEGATIVE MG/DL
LEUKOCYTE ESTERASE UR QL STRIP: NEGATIVE
LYMPHOCYTES # BLD AUTO: 1.44 THOUSANDS/ΜL (ref 0.6–4.47)
LYMPHOCYTES NFR BLD AUTO: 17 % (ref 14–44)
MCH RBC QN AUTO: 27.4 PG (ref 26.8–34.3)
MCHC RBC AUTO-ENTMCNC: 31.1 G/DL (ref 31.4–37.4)
MCV RBC AUTO: 88 FL (ref 82–98)
MONOCYTES # BLD AUTO: 0.54 THOUSAND/ΜL (ref 0.17–1.22)
MONOCYTES NFR BLD AUTO: 6 % (ref 4–12)
NEUTROPHILS # BLD AUTO: 6.2 THOUSANDS/ΜL (ref 1.85–7.62)
NEUTS SEG NFR BLD AUTO: 75 % (ref 43–75)
NITRITE UR QL STRIP: NEGATIVE
NON-SQ EPI CELLS URNS QL MICRO: ABNORMAL /HPF
NRBC BLD AUTO-RTO: 0 /100 WBCS
PH UR STRIP.AUTO: 6.5 [PH]
PLATELET # BLD AUTO: 354 THOUSANDS/UL (ref 149–390)
PMV BLD AUTO: 9.1 FL (ref 8.9–12.7)
POTASSIUM SERPL-SCNC: 4.5 MMOL/L (ref 3.5–5.3)
PROT UR STRIP-MCNC: ABNORMAL MG/DL
RBC # BLD AUTO: 4.86 MILLION/UL (ref 3.81–5.12)
RBC #/AREA URNS AUTO: ABNORMAL /HPF
SODIUM SERPL-SCNC: 143 MMOL/L (ref 136–145)
SP GR UR STRIP.AUTO: 1.01 (ref 1–1.03)
UROBILINOGEN UR QL STRIP.AUTO: 0.2 E.U./DL
WBC # BLD AUTO: 8.39 THOUSAND/UL (ref 4.31–10.16)
WBC #/AREA URNS AUTO: ABNORMAL /HPF

## 2022-06-26 PROCEDURE — 96360 HYDRATION IV INFUSION INIT: CPT

## 2022-06-26 PROCEDURE — 74176 CT ABD & PELVIS W/O CONTRAST: CPT

## 2022-06-26 PROCEDURE — 99284 EMERGENCY DEPT VISIT MOD MDM: CPT

## 2022-06-26 PROCEDURE — 81001 URINALYSIS AUTO W/SCOPE: CPT | Performed by: EMERGENCY MEDICINE

## 2022-06-26 PROCEDURE — 80048 BASIC METABOLIC PNL TOTAL CA: CPT | Performed by: EMERGENCY MEDICINE

## 2022-06-26 PROCEDURE — 85025 COMPLETE CBC W/AUTO DIFF WBC: CPT | Performed by: EMERGENCY MEDICINE

## 2022-06-26 PROCEDURE — 36415 COLL VENOUS BLD VENIPUNCTURE: CPT | Performed by: EMERGENCY MEDICINE

## 2022-06-26 PROCEDURE — 99284 EMERGENCY DEPT VISIT MOD MDM: CPT | Performed by: EMERGENCY MEDICINE

## 2022-06-26 RX ORDER — CEPHALEXIN 250 MG/1
500 CAPSULE ORAL ONCE
Status: COMPLETED | OUTPATIENT
Start: 2022-06-26 | End: 2022-06-26

## 2022-06-26 RX ORDER — CEPHALEXIN 500 MG/1
500 CAPSULE ORAL 2 TIMES DAILY
Qty: 20 CAPSULE | Refills: 0 | Status: SHIPPED | OUTPATIENT
Start: 2022-06-26 | End: 2022-07-06

## 2022-06-26 RX ADMIN — SODIUM CHLORIDE 1000 ML: 0.9 INJECTION, SOLUTION INTRAVENOUS at 07:59

## 2022-06-26 RX ADMIN — CEPHALEXIN 500 MG: 250 CAPSULE ORAL at 09:30

## 2022-06-26 NOTE — Clinical Note
Kenna Monterroso was seen and treated in our emergency department on 6/26/2022  Diagnosis:     Tate Talavera  may return to work on return date  She may return on this date: 06/27/2022         If you have any questions or concerns, please don't hesitate to call        Veronica Flores RN    ______________________________           _______________          _______________  Hospital Representative                              Date                                Time

## 2022-06-26 NOTE — ED PROVIDER NOTES
History  Chief Complaint   Patient presents with    Vaginal Bleeding     Pt arrived ambulatory with c/o vaginal bleeding that started this morning  Pt states she thinks she had a UTI and took some keflex that she had at home  Pt states her symptoms have not improved and then was taking some OTC medications with no relief  History provided by:  Patient  Blood in Urine  This is a new problem  The current episode started yesterday  The problem has been gradually improving since onset  She describes the hematuria as gross hematuria  The hematuria occurs during the initial portion of her urinary stream  The pain is mild  She describes her urine color as light pink  Irritative symptoms include frequency and urgency  Obstructive symptoms do not include an intermittent stream or a slower stream  Associated symptoms include abdominal pain (suprapubick)  Pertinent negatives include no bone pain, chills, fever, flank pain, genital pain, inability to urinate, nausea, urinary retention or vomiting  Prior to Admission Medications   Prescriptions Last Dose Informant Patient Reported? Taking?   escitalopram (LEXAPRO) 10 mg tablet   No No   Sig: Take 1 tablet (10 mg total) by mouth daily   rosuvastatin (CRESTOR) 5 mg tablet   No No   Sig: Take 1 tablet (5 mg total) by mouth daily      Facility-Administered Medications: None       Past Medical History:   Diagnosis Date    Hypercholesterolemia        Past Surgical History:   Procedure Laterality Date    COLONOSCOPY      complete     DILATION AND CURETTAGE OF UTERUS      x2        Family History   Problem Relation Age of Onset    Lung cancer Father     Brain cancer Father     Arthritis Family     Hypertension Family         essential      Hyperlipidemia Family     Breast cancer Cousin     Endometrial cancer Neg Hx     Ovarian cancer Neg Hx     Colon cancer Neg Hx      I have reviewed and agree with the history as documented      E-Cigarette/Vaping E-Cigarette/Vaping Substances     Social History     Tobacco Use    Smoking status: Never Smoker    Smokeless tobacco: Never Used   Substance Use Topics    Alcohol use: No    Drug use: No       Review of Systems   Constitutional: Negative for chills and fever  Gastrointestinal: Positive for abdominal pain (suprapubick)  Negative for nausea and vomiting  Genitourinary: Positive for frequency, hematuria and urgency  Negative for flank pain  All other systems reviewed and are negative  Physical Exam  Physical Exam  Vitals and nursing note reviewed  Constitutional:       General: She is not in acute distress  Appearance: She is well-developed  She is not diaphoretic  HENT:      Head: Normocephalic and atraumatic  Nose: Nose normal    Eyes:      Conjunctiva/sclera: Conjunctivae normal    Cardiovascular:      Rate and Rhythm: Normal rate and regular rhythm  Heart sounds: Normal heart sounds  Pulmonary:      Effort: Pulmonary effort is normal  No respiratory distress  Breath sounds: Normal breath sounds  Abdominal:      General: There is no distension  Palpations: Abdomen is soft  Tenderness: There is no abdominal tenderness  Musculoskeletal:         General: Normal range of motion  Cervical back: Normal range of motion and neck supple  Skin:     General: Skin is warm and dry  Neurological:      General: No focal deficit present  Mental Status: She is alert and oriented to person, place, and time     Psychiatric:         Mood and Affect: Mood normal          Vital Signs  ED Triage Vitals [06/26/22 0702]   Temperature Pulse Respirations Blood Pressure SpO2   98 3 °F (36 8 °C) 78 18 151/70 97 %      Temp Source Heart Rate Source Patient Position - Orthostatic VS BP Location FiO2 (%)   Oral Monitor Sitting Left arm --      Pain Score       --           Vitals:    06/26/22 0702 06/26/22 0800 06/26/22 0830 06/26/22 0900   BP: 151/70 102/64 116/63 122/58 Pulse: 78 73 73 73   Patient Position - Orthostatic VS: Sitting Sitting Sitting Sitting         Visual Acuity      ED Medications  Medications   sodium chloride 0 9 % bolus 1,000 mL (0 mL Intravenous Stopped 6/26/22 0924)   cephalexin (KEFLEX) capsule 500 mg (500 mg Oral Given 6/26/22 0930)       Diagnostic Studies  Results Reviewed     Procedure Component Value Units Date/Time    Basic metabolic panel [[id number]] Collected: 06/26/22 0756    Lab Status: Final result Specimen: Blood from Arm, Right Updated: 06/26/22 0819     Sodium 143 mmol/L      Potassium 4 5 mmol/L      Chloride 106 mmol/L      CO2 27 mmol/L      ANION GAP 10 mmol/L      BUN 15 mg/dL      Creatinine 0 62 mg/dL      Glucose 96 mg/dL      Calcium 9 0 mg/dL      eGFR 92 ml/min/1 73sq m     Narrative:      National Kidney Disease Foundation guidelines for Chronic Kidney Disease (CKD):     Stage 1 with normal or high GFR (GFR > 90 mL/min/1 73 square meters)    Stage 2 Mild CKD (GFR = 60-89 mL/min/1 73 square meters)    Stage 3A Moderate CKD (GFR = 45-59 mL/min/1 73 square meters)    Stage 3B Moderate CKD (GFR = 30-44 mL/min/1 73 square meters)    Stage 4 Severe CKD (GFR = 15-29 mL/min/1 73 square meters)    Stage 5 End Stage CKD (GFR <15 mL/min/1 73 square meters)  Note: GFR calculation is accurate only with a steady state creatinine    Urine Microscopic [[id number]]  (Abnormal) Collected: 06/26/22 0756    Lab Status: Final result Specimen: Urine Updated: 06/26/22 0811     RBC, UA 20-30 /hpf      WBC, UA 2-4 /hpf      Epithelial Cells Occasional /hpf      Bacteria, UA Occasional /hpf     UA w Reflex to Microscopic w Reflex to Culture [[id number]]  (Abnormal) Collected: 06/26/22 0756    Lab Status: Final result Specimen: Urine Updated: 06/26/22 0809     Color, UA Red     Clarity, UA Slightly Cloudy     Specific [place], UA 1 010     pH, UA 6 5     Leukocytes, UA Negative     Nitrite, UA Negative     Protein, UA 30 (1+) mg/dl      Glucose, UA Negative mg/dl      Ketones, UA Negative mg/dl      Urobilinogen, UA 0 2 E U /dl      Bilirubin, UA Negative     Occult Blood, UA Large    CBC and differential [[id number]]  (Abnormal) Collected: 06/26/22 0756    Lab Status: Final result Specimen: Blood from Arm, Right Updated: 06/26/22 0806     WBC 8 39 Thousand/uL      RBC 4 86 Million/uL      Hemoglobin 13 3 g/dL      Hematocrit 42 7 %      MCV 88 fL      MCH 27 4 pg      MCHC 31 1 g/dL      RDW 13 4 %      MPV 9 1 fL      Platelets 847 Thousands/uL      nRBC 0 /100 WBCs      Neutrophils Relative 75 %      Immat GRANS % 0 %      Lymphocytes Relative 17 %      Monocytes Relative 6 %      Eosinophils Relative 2 %      Basophils Relative 0 %      Neutrophils Absolute 6 20 Thousands/µL      Immature Grans Absolute 0 02 Thousand/uL      Lymphocytes Absolute 1 44 Thousands/µL      Monocytes Absolute 0 54 Thousand/µL      Eosinophils Absolute 0 16 Thousand/µL      Basophils Absolute 0 03 Thousands/µL                  CT abdomen pelvis wo contrast   Final Result by Radha Salmeron DO (06/26 0804)   1  Abnormal appearance of the uterus as described above  Correlate for endometrial hyperplasia versus endometrial neoplasm  Recommend follow-up GYN oncology consultation and/or routine pelvic ultrasound  2   Colonic diverticulosis  3   Large hiatal hernia/partially intrathoracic stomach  If warranted, consider follow-up GI and/or surgical consultation  The study was marked in [facility] for immediate notification        Workstation performed: [id number]                    Procedures  Procedures         ED Course                                             MDM  Number of Diagnoses or Management Options  Bladder infection: new and requires workup  Hematuria: new and requires workup     Amount and/or Complexity of Data Reviewed  Clinical lab tests: ordered and reviewed  Tests in the radiology section of CPT®: ordered and reviewed    Risk of Complications, Morbidity, and/or Mortality  Presenting problems: high  Diagnostic procedures: high  Management options: high    Patient Progress  Patient progress: improved (D/w pt her labs and CT scan results including uterine finding, hiatal hernia, and deplicating renal pelvis  D/w her f/u if has continued hematuria, f/u with PCP and may need f/u with urology  D/w her f/u with OB for u/s for findings of uterus and thickened stripe to r/o endometrial cancer  All doctors are with [facility] so she will do that  D/w her worsening si/sx to return for  )      Disposition  Final diagnoses:   Hematuria   Bladder infection     Time reflects when diagnosis was documented in both MDM as applicable and the Disposition within this note     Time User Action Codes Description Comment    6/26/2022  9:02 AM Jessica Troy [R31 9] Hematuria     6/26/2022  9:02 AM Jessica Troy [N30 90] Bladder infection       ED Disposition     ED Disposition   Discharge    Condition   Stable    Date/Time   Sun Jun 26, 2022  9:02 AM    Comment   Danna Johns discharge to home/self care  Follow-up Information     Follow up With Specialties Details Why Lili Kingston MD Family Medicine Go to  If symptoms worsen [address]  [phone]            Patient's Medications   Discharge Prescriptions    CEPHALEXIN (KEFLEX) 500 MG CAPSULE    Take 1 capsule (500 mg total) by mouth 2 (two) times a day for 10 days       Start Date: 6/26/2022 End Date: 7/6/2022       Order Dose: 500 mg       Quantity: 20 capsule    Refills: 0       No discharge procedures on file      PDMP Review     None          ED Provider  Electronically Signed by           Rosa Mart MD  06/26/22 9329

## 2022-06-27 ENCOUNTER — TELEPHONE (OUTPATIENT)
Dept: FAMILY MEDICINE CLINIC | Facility: CLINIC | Age: 67
End: 2022-06-27

## 2022-06-27 DIAGNOSIS — N93.9 VAGINAL BLEEDING: Primary | ICD-10-CM

## 2022-06-27 DIAGNOSIS — R93.89 THICKENED ENDOMETRIUM: ICD-10-CM

## 2022-06-28 ENCOUNTER — TELEPHONE (OUTPATIENT)
Dept: GYNECOLOGIC ONCOLOGY | Facility: CLINIC | Age: 67
End: 2022-06-28

## 2022-06-28 NOTE — TELEPHONE ENCOUNTER
Intake Form   Patient Details   Mohit Her     1955     Reason For Appointment   Who is Calling? If not patient, Name? Who is the Referring Doctor? Caitlin Jin MD   What is the diagnosis? N93 9 (ICD-10-CM) - Vaginal bleeding   R93 89 (ICD-10-CM) - Thickened endometrium        Has this diagnosis been confirmed by a biopsy/surgery? If yes, what is the date it was done? No   Biopsy done at [place] [address]? If not, where was it done? N/A   Was imaging done, and was it done at [facility]? If not, where was it done? CT 6/26/22- US scheduled for 7/1   For 2nd Opinions Only: Are you currently undergoing treatment, or are you scheduled to start treatment? If yes, name of facility, city and state     For "History Of" only: Have you completed treatment? Have you had Genetic Testing done in the past?  If so, advise to bring test results to their visit No   Record Gathering Information   Did you advise to have records faxed to [phone]? No   Did you advise to bring discs to office visit? No   Scheduling Information   What is the best call back number?   [phone]   What Insurance does patient have & is an insurance referral required Medicare & Ingris Fleming   If patient is NEW to [address] [place] [address] a new patient packet (Titled Breast/Gyn) Not new   Miscellaneous Information

## 2022-06-28 NOTE — TELEPHONE ENCOUNTER
Spoke to patient about ER results  She was in the ER for vaginal bleeding  She thought it was a UTI initially  We spoke about the  CT findings which showed endometrial thickening  I recommended a pelvic ultrasound and consultation w/ Dr Liliya Villalobos  Patient agrees  Orders were placed

## 2022-07-01 ENCOUNTER — HOSPITAL ENCOUNTER (OUTPATIENT)
Dept: ULTRASOUND IMAGING | Facility: HOSPITAL | Age: 67
Discharge: HOME | End: 2022-07-01
Payer: MEDICARE

## 2022-07-01 DIAGNOSIS — N93.9 VAGINAL BLEEDING: ICD-10-CM

## 2022-07-01 PROCEDURE — 76830 TRANSVAGINAL US NON-OB: CPT

## 2022-07-01 PROCEDURE — 76856 US EXAM PELVIC COMPLETE: CPT

## 2022-07-19 ENCOUNTER — TELEPHONE (OUTPATIENT)
Dept: GYNECOLOGIC ONCOLOGY | Facility: CLINIC | Age: 67
End: 2022-07-19

## 2022-07-19 NOTE — TELEPHONE ENCOUNTER
LVM for the patient regarding her Consultation appointment tomorrow with Dr Pillai, Left the office number for the patient to call to confirm

## 2022-07-20 ENCOUNTER — CONSULT (OUTPATIENT)
Dept: GYNECOLOGIC ONCOLOGY | Facility: CLINIC | Age: 67
End: 2022-07-20
Payer: MEDICARE

## 2022-07-20 VITALS
HEIGHT: 62 IN | HEART RATE: 111 BPM | OXYGEN SATURATION: 95 % | TEMPERATURE: 96.7 F | SYSTOLIC BLOOD PRESSURE: 110 MMHG | RESPIRATION RATE: 18 BRPM | WEIGHT: 130 LBS | BODY MASS INDEX: 23.92 KG/M2 | DIASTOLIC BLOOD PRESSURE: 74 MMHG

## 2022-07-20 DIAGNOSIS — N95.0 PMB (POSTMENOPAUSAL BLEEDING): Primary | ICD-10-CM

## 2022-07-20 DIAGNOSIS — N93.9 VAGINAL BLEEDING: ICD-10-CM

## 2022-07-20 DIAGNOSIS — R93.89 THICKENED ENDOMETRIUM: ICD-10-CM

## 2022-07-20 PROCEDURE — 88342 IMHCHEM/IMCYTCHM 1ST ANTB: CPT | Performed by: PATHOLOGY

## 2022-07-20 PROCEDURE — 88305 TISSUE EXAM BY PATHOLOGIST: CPT | Performed by: PATHOLOGY

## 2022-07-20 PROCEDURE — 99204 OFFICE O/P NEW MOD 45 MIN: CPT | Performed by: OBSTETRICS & GYNECOLOGY

## 2022-07-20 PROCEDURE — 58100 BIOPSY OF UTERUS LINING: CPT | Performed by: OBSTETRICS & GYNECOLOGY

## 2022-07-20 NOTE — PROGRESS NOTES
Assessment/Plan:    Problem List Items Addressed This Visit        Other    PMB (postmenopausal bleeding) - Primary     Patient is very pleasant 66-year-old female with a history of postmenopausal bleeding  She had CT scan and ultrasound both reveal a single abnormality in a 3+ cm endometrial stripe  This is most consistent with an endometrial polyp  It is difficult to tell whether this is malignant or otherwise  A Pipelle biopsy was performed today  The biopsy is negative for hyperplasia or malignancy a D&C would be recommended  If hyperplasia or malignancy is noted then consideration for hysterectomy would be undertaken  The patient will follow-up in 2 weeks for repeat evaluation  Other Visit Diagnoses     Vaginal bleeding        Thickened endometrium        Relevant Orders    Tissue Exam              CHIEF COMPLAINT:  Postmenopausal bleeding      Previous therapy:  Oncology History    No history exists  Patient ID: Domonique Woodruff is a 69 y o  female  Patient is very pleasant 66-year-old female seen in consultation for postmenopausal bleeding  Patient was in her usual state of health until several months ago when she developed a single postmenopausal bleed  She had no other symptoms of significant abdominal pain pelvic pain bowel or bladder complaint  She underwent a abdominal and pelvic CT scan followed by a pelvic ultrasound  The results are below:     FINDINGS:  ABDOMEN  LOWER CHEST:  No clinically significant abnormality identified in the visualized lower chest            LIVER/BILIARY TREE:  Unremarkable         GALLBLADDER:  No calcified gallstones  No pericholecystic inflammatory change            SPLEEN:  Unremarkable         PANCREAS:  Unremarkable         ADRENAL GLANDS:  Unremarkable         KIDNEYS/URETERS:    The kidneys are symmetric  There are bilateral extrarenal pelves      No renal calyceal or ureteric calculi    No hydronephrosis or hydroureter            STOMACH AND BOWEL:    There is a large hiatal hernia/partially intrathoracic stomach      No evidence of small bowel obstruction      The colon is segmentally distended with feces  Normal fecal burden throughout the colon  Scattered diverticula throughout the colon, most pronounced in the sigmoid colon  Sigmoid colon redundant and tortuous  Nothing to suggest acute diverticulitis         APPENDIX:  No findings to suggest appendicitis           ABDOMINOPELVIC CAVITY:  No ascites  No pneumoperitoneum      There are subcentimeter mesenteric and retroperitoneal lymph nodes  There are prominent bilateral internal and external iliac nodes         VESSELS:  Atherosclerotic changes are present  No evidence of aneurysm               PELVIS  REPRODUCTIVE ORGANS:    The uterus is enlarged and heterogeneous in CT density      There is diffuse thickening of the endometrial stripe measuring 3 8 cm  This is abnormal for a postmenopausal female  There is mild prominence of the cervix  There is prominence of the endocervical canal     ENDOMETRIUM:    The endometrial echo complex has an AP caliber of 46 0 mm  Heterogeneous echogenic area measuring 5 8 x 4 6 x 4 8 the centimeters was measured in the endometrial cavity  Some areas show increased vascularity with other echogenic areas without vascularity which could be secondary to blood products  There is abnormal heterogeneous the endometrium with cystic areas and areas of increased vascularity  Findings are highly concerning for the presence of endometrial hyperplasia versus neoplasm  Degenerated intracavitary myoma is in the differential     Additional evaluation necessary      OVARIES/ADNEXA:  Right ovary:  1 5 x 1 1 x 0 9 cm  0 8 mL  No suspicious right ovarian abnormality  Doppler flow within normal limits      Left ovary:  1 4 x 1 0 x 1 4 cm  0 9 mL  No suspicious left ovarian abnormality    Doppler flow within normal limits      No suspicious adnexal mass or loculated collections  There is no free fluid      IMPRESSION:     Abnormally expanded endometrial cavity containing heterogeneous mass with internal vascularity and blood products  Differential diagnosis includes endometrial neoplasm, endometrial hyperplasia versus less likely degenerated intracavitary myoma  The study was marked in [facility] for immediate notification  The patient has had no further bleeding since that initial time  The patient does have a history of sofiya menopausal heavy bleeding and underwent D and C many years ago  These were unremarkable with regard to malignancy  She was treated with Depo-Provera around that time  Today, the patient is doing well  She denies significant abdominal pain, pelvic pain, nausea, vomiting, constipation, diarrhea, fevers, chills, or vaginal bleeding  Review of Systems   Constitutional: Negative  HENT: Negative  Eyes: Negative  Respiratory: Negative  Cardiovascular: Negative  Gastrointestinal: Negative  Endocrine: Negative  Genitourinary: Positive for vaginal bleeding  Musculoskeletal: Negative  Skin: Negative  Neurological: Negative  Hematological: Negative  Psychiatric/Behavioral: Negative  Current Outpatient Medications   Medication Sig Dispense Refill    escitalopram (LEXAPRO) 10 mg tablet Take 1 tablet (10 mg total) by mouth daily 90 tablet 3    rosuvastatin (CRESTOR) 5 mg tablet Take 1 tablet (5 mg total) by mouth daily 90 tablet 3     No current facility-administered medications for this visit         Allergies   Allergen Reactions    Acetazolamide     Sulfa Antibiotics        Past Medical History:   Diagnosis Date    Hypercholesterolemia        Past Surgical History:   Procedure Laterality Date    COLONOSCOPY      complete     DILATION AND CURETTAGE OF UTERUS      x2        OB History        4    Para   2    Term   2            AB        Living           SAB IAB        Ectopic        Multiple        Live Births                     Family History   Problem Relation Age of Onset    Lung cancer Father     Brain cancer Father     Arthritis Family     Hypertension Family         essential      Hyperlipidemia Family     Breast cancer Cousin     Endometrial cancer Neg Hx     Ovarian cancer Neg Hx     Colon cancer Neg Hx        The following portions of the patient's history were reviewed and updated as appropriate: allergies, current medications, past family history, past medical history, past surgical history and problem list       Objective:    Blood pressure 110/74, pulse (!) 111, temperature (!) 96 7 °F (35 9 °C), resp  rate 18, height 5' 1 5" (1 562 m), weight 59 kg (130 lb), SpO2 95 %, not currently breastfeeding  Body mass index is 24 17 kg/m²  Physical Exam  Constitutional:       Appearance: She is well-developed  HENT:      Head: Normocephalic and atraumatic  Eyes:      Pupils: Pupils are equal, round, and reactive to light  Cardiovascular:      Rate and Rhythm: Normal rate and regular rhythm  Heart sounds: Normal heart sounds  Pulmonary:      Effort: Pulmonary effort is normal  No respiratory distress  Breath sounds: Normal breath sounds  Chest:   Breasts:      Right: No supraclavicular adenopathy  Left: No supraclavicular adenopathy  Abdominal:      General: Bowel sounds are normal  There is no distension  Palpations: Abdomen is soft  Abdomen is not rigid  Tenderness: There is no abdominal tenderness  There is no guarding or rebound  Genitourinary:     Comments: -Normal external female genitalia, normal Bartholin's and [place]'s glands                  -Normal midline urethral meatus   No lesions notes                  -Bladder without fullness mass or tenderness                  -Vagina without lesion or discharge No significant cystocele or rectocele noted                  -Cervix deviated to patient's right with open os but the cervix itself is less than 1 cm                    -Uterus with normal contour, mobility  Prominent in the midline No tenderness,                  -Adnexae without  mass or tenderness                  - Anus without fissure of lesion    Musculoskeletal:         General: Normal range of motion  Cervical back: Normal range of motion and neck supple  Lymphadenopathy:      Cervical: No cervical adenopathy  Upper Body:      Right upper body: No supraclavicular adenopathy  Left upper body: No supraclavicular adenopathy  Skin:     General: Skin is warm and dry  Neurological:      Mental Status: She is alert and oriented to person, place, and time     Psychiatric:         Behavior: Behavior normal            No results found for:   Lab Results   Component Value Date    WBC 8 39 06/26/2022    HGB 13 3 06/26/2022    HCT 42 7 06/26/2022    MCV 88 06/26/2022     06/26/2022     Lab Results   Component Value Date    K 4 5 06/26/2022     06/26/2022    CO2 27 06/26/2022    BUN 15 06/26/2022    CREATININE 0 62 06/26/2022    GLUF 75 03/17/2022    CALCIUM 9 0 06/26/2022    AST 20 03/17/2022    ALT 20 03/17/2022    ALKPHOS 66 03/17/2022    EGFR 92 06/26/2022       Endometrial biopsy    Date/Time: 7/20/2022 12:07 PM  Performed by: Miguelina Mcdaniel MD  Authorized by: Miguelina Mcdaniel MD   Universal Protocol:  Consent given by: patient  Patient understanding: patient states understanding of the procedure being performed      Indication:     Indications: Post-menopausal bleeding      Chronicity of post-menopausal bleeding:  New  Procedure:     Procedure: endometrial biopsy with Pipelle      A bivalve speculum was placed in the vagina: yes      Cervix cleaned and prepped: yes      The cervix was dilated: no      Uterus sounded: yes      Uterus sound depth (cm):  8    Specimen collected: specimen collected and sent to pathology    Findings:     Uterus size:  <6 weeks  Comments: Procedure comments:  Moderate tissue was obtained  The cervix was deviated to the patient's right  The uterus was midline and globular no obvious signs of extra uterine or extra cervical disease was noted

## 2022-07-20 NOTE — ASSESSMENT & PLAN NOTE
Patient is very pleasant 68-year-old female with a history of postmenopausal bleeding  She had CT scan and ultrasound both reveal a single abnormality in a 3+ cm endometrial stripe  This is most consistent with an endometrial polyp  It is difficult to tell whether this is malignant or otherwise  A Pipelle biopsy was performed today  The biopsy is negative for hyperplasia or malignancy a D&C would be recommended  If hyperplasia or malignancy is noted then consideration for hysterectomy would be undertaken  The patient will follow-up in 2 weeks for repeat evaluation

## 2022-08-03 ENCOUNTER — APPOINTMENT (OUTPATIENT)
Dept: LAB | Facility: CLINIC | Age: 67
End: 2022-08-03
Payer: MEDICARE

## 2022-08-03 ENCOUNTER — OFFICE VISIT (OUTPATIENT)
Dept: GYNECOLOGIC ONCOLOGY | Facility: CLINIC | Age: 67
End: 2022-08-03
Payer: MEDICARE

## 2022-08-03 VITALS
RESPIRATION RATE: 16 BRPM | OXYGEN SATURATION: 98 % | WEIGHT: 131 LBS | TEMPERATURE: 97.1 F | DIASTOLIC BLOOD PRESSURE: 60 MMHG | BODY MASS INDEX: 24.11 KG/M2 | HEIGHT: 62 IN | SYSTOLIC BLOOD PRESSURE: 108 MMHG | HEART RATE: 74 BPM

## 2022-08-03 DIAGNOSIS — C55 UTERINE CARCINOSARCOMA (HCC): ICD-10-CM

## 2022-08-03 DIAGNOSIS — C55 UTERINE CARCINOSARCOMA (HCC): Primary | ICD-10-CM

## 2022-08-03 DIAGNOSIS — C54.1 MALIGNANT NEOPLASM OF ENDOMETRIUM (HCC): ICD-10-CM

## 2022-08-03 LAB
ALBUMIN SERPL BCP-MCNC: 3.8 G/DL (ref 3.5–5)
ALP SERPL-CCNC: 66 U/L (ref 46–116)
ALT SERPL W P-5'-P-CCNC: 20 U/L (ref 12–78)
ANION GAP SERPL CALCULATED.3IONS-SCNC: 5 MMOL/L (ref 4–13)
AST SERPL W P-5'-P-CCNC: 18 U/L (ref 5–45)
BASOPHILS # BLD AUTO: 0.06 THOUSANDS/ΜL (ref 0–0.1)
BASOPHILS NFR BLD AUTO: 1 % (ref 0–1)
BILIRUB SERPL-MCNC: 0.43 MG/DL (ref 0.2–1)
BUN SERPL-MCNC: 17 MG/DL (ref 5–25)
CALCIUM SERPL-MCNC: 9.6 MG/DL (ref 8.3–10.1)
CHLORIDE SERPL-SCNC: 107 MMOL/L (ref 96–108)
CO2 SERPL-SCNC: 28 MMOL/L (ref 21–32)
CREAT SERPL-MCNC: 0.69 MG/DL (ref 0.6–1.3)
EOSINOPHIL # BLD AUTO: 0.16 THOUSAND/ΜL (ref 0–0.61)
EOSINOPHIL NFR BLD AUTO: 2 % (ref 0–6)
ERYTHROCYTE [DISTWIDTH] IN BLOOD BY AUTOMATED COUNT: 13.3 % (ref 11.6–15.1)
GFR SERPL CREATININE-BSD FRML MDRD: 89 ML/MIN/1.73SQ M
GLUCOSE SERPL-MCNC: 88 MG/DL (ref 65–140)
HCT VFR BLD AUTO: 43.8 % (ref 34.8–46.1)
HGB BLD-MCNC: 13.3 G/DL (ref 11.5–15.4)
IMM GRANULOCYTES # BLD AUTO: 0.01 THOUSAND/UL (ref 0–0.2)
IMM GRANULOCYTES NFR BLD AUTO: 0 % (ref 0–2)
LYMPHOCYTES # BLD AUTO: 2.08 THOUSANDS/ΜL (ref 0.6–4.47)
LYMPHOCYTES NFR BLD AUTO: 25 % (ref 14–44)
MCH RBC QN AUTO: 27 PG (ref 26.8–34.3)
MCHC RBC AUTO-ENTMCNC: 30.4 G/DL (ref 31.4–37.4)
MCV RBC AUTO: 89 FL (ref 82–98)
MONOCYTES # BLD AUTO: 0.65 THOUSAND/ΜL (ref 0.17–1.22)
MONOCYTES NFR BLD AUTO: 8 % (ref 4–12)
NEUTROPHILS # BLD AUTO: 5.25 THOUSANDS/ΜL (ref 1.85–7.62)
NEUTS SEG NFR BLD AUTO: 64 % (ref 43–75)
NRBC BLD AUTO-RTO: 0 /100 WBCS
PLATELET # BLD AUTO: 398 THOUSANDS/UL (ref 149–390)
PMV BLD AUTO: 9.5 FL (ref 8.9–12.7)
POTASSIUM SERPL-SCNC: 4.4 MMOL/L (ref 3.5–5.3)
PROT SERPL-MCNC: 7.2 G/DL (ref 6.4–8.4)
RBC # BLD AUTO: 4.92 MILLION/UL (ref 3.81–5.12)
SODIUM SERPL-SCNC: 140 MMOL/L (ref 135–147)
WBC # BLD AUTO: 8.21 THOUSAND/UL (ref 4.31–10.16)

## 2022-08-03 PROCEDURE — 36415 COLL VENOUS BLD VENIPUNCTURE: CPT

## 2022-08-03 PROCEDURE — 99215 OFFICE O/P EST HI 40 MIN: CPT | Performed by: OBSTETRICS & GYNECOLOGY

## 2022-08-03 PROCEDURE — 85025 COMPLETE CBC W/AUTO DIFF WBC: CPT

## 2022-08-03 PROCEDURE — 83036 HEMOGLOBIN GLYCOSYLATED A1C: CPT

## 2022-08-03 PROCEDURE — 80053 COMPREHEN METABOLIC PANEL: CPT

## 2022-08-03 RX ORDER — MELATONIN
1000 DAILY
COMMUNITY

## 2022-08-03 RX ORDER — MULTIVIT-MIN/IRON FUM/FOLIC AC 7.5 MG-4
1 TABLET ORAL DAILY
COMMUNITY

## 2022-08-03 RX ORDER — FERROUS SULFATE 325(65) MG
325 TABLET ORAL
COMMUNITY
End: 2022-08-16

## 2022-08-03 RX ORDER — ENOXAPARIN SODIUM 300 MG/3ML
40 INJECTION INTRAVENOUS; SUBCUTANEOUS
Status: CANCELLED | OUTPATIENT
Start: 2022-08-03 | End: 2022-08-04

## 2022-08-03 RX ORDER — GABAPENTIN 100 MG/1
100 CAPSULE ORAL ONCE
Status: CANCELLED | OUTPATIENT
Start: 2022-08-03 | End: 2022-08-03

## 2022-08-03 RX ORDER — ACETAMINOPHEN 325 MG/1
975 TABLET ORAL ONCE
Status: CANCELLED | OUTPATIENT
Start: 2022-08-03 | End: 2022-08-03

## 2022-08-03 NOTE — ASSESSMENT & PLAN NOTE
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  This appears to be early stage by clinical exam   We have discussed treatment options for this disease including chemotherapy, radiation therapy, and hormonal management  I have stated that medical evidence indicates that at this point to surgical management is her best option  I have discussed also the risks and benefits of lymph node biopsy at the time of surgery  We have discussed open versus laparoscopic versus robotic approach and have recommended the following:    Robotically assisted total laparoscopic hysterectomy bilateral salpingo-oophorectomy with sentinel lymph node biopsy  Have discussed risks and benefits of the procedure including bleeding requiring transfusion infection, infection, damage to local structures including bowel bladder ureter and other local organs  We discussed the risk of deep venous thrombosis  All of these complications are in the 2-4% range of likelihood  An open procedure may be required  The patient understands the risks and benefits of the procedure and has signed an informed consent  I personally signed the consent form with her  She does understand that further treatment including chemotherapy radiation therapy or hormones may be required based on the final postoperative pathologic diagnosis and staging  Standard preoperative testing including type and screen is CBC CPM P chest x-ray and EKG will be ordered  Any appropriate consultations for preoperative evaluation will also be ordered  Overall consultation took 60 min with greater than 50% in dedicated toward discussion time

## 2022-08-03 NOTE — PROGRESS NOTES
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  This appears to be early stage by clinical exam   We have discussed treatment options for this disease including chemotherapy, radiation therapy, and hormonal management  I have stated that medical evidence indicates that at this point to surgical management is her best option  I have discussed also the risks and benefits of lymph node biopsy at the time of surgery  We have discussed open versus laparoscopic versus robotic approach and have recommended the following:    Robotically assisted total laparoscopic hysterectomy bilateral salpingo-oophorectomy with sentinel lymph node biopsy  Have discussed risks and benefits of the procedure including bleeding requiring transfusion infection, infection, damage to local structures including bowel bladder ureter and other local organs  We discussed the risk of deep venous thrombosis  All of these complications are in the 2-4% range of likelihood  An open procedure may be required  The patient understands the risks and benefits of the procedure and has signed an informed consent  I personally signed the consent form with her  She does understand that further treatment including chemotherapy radiation therapy or hormones may be required based on the final postoperative pathologic diagnosis and staging  Standard preoperative testing including type and screen is CBC CPM P chest x-ray and EKG will be ordered  Any appropriate consultations for preoperative evaluation will also be ordered  Overall consultation took 60 min with greater than 50% in dedicated toward discussion time             Other Visit Diagnoses     Uterine carcinosarcoma ([facility] [address] )    -  Primary    Relevant Orders    Case request operating room: HYSTERECTOMY LAPAROSCOPIC TOTAL ([address]) bilateral salpingo-oophorectomy sentinel lymph node mapping and biopsy W/ ROBOTICS (Completed)    Type and screen    Comprehensive metabolic panel    CBC and differential    HEMOGLOBIN A1C W/ EAG ESTIMATION    EKG 12 lead    XR chest pa & lateral              CHIEF COMPLAINT:  Postmenopausal bleeding likely uterine carcinosarcoma    Subjective:     Problem:  Cancer Staging  No matching staging information was found for the patient  Previous therapy:  Oncology History    No history exists  Patient ID: eBnji Baumann is a 69 y o  female  Patient is very pleasant 61-year-old female with a history of postmenopausal bleeding  She underwent pelvic ultrasound which reveals the following:  UTERUS:  The uterus is anteverted in position, measuring 11 2 x 4 8 x 6 1 cm  The uterus has a normal contour and echotexture  The cervix appears within normal limits      ENDOMETRIUM:    The endometrial echo complex has an AP caliber of 46 0 mm  Heterogeneous echogenic area measuring 5 8 x 4 6 x 4 8 the centimeters was measured in the endometrial cavity  Some areas show increased vascularity with other echogenic areas without vascularity which could be secondary to blood products  There is abnormal heterogeneous the endometrium with cystic areas and areas of increased vascularity  Findings are highly concerning for the presence of endometrial hyperplasia versus neoplasm  Degenerated intracavitary myoma is in the differential     Additional evaluation necessary      OVARIES/ADNEXA:  Right ovary:  1 5 x 1 1 x 0 9 cm  0 8 mL  No suspicious right ovarian abnormality  Doppler flow within normal limits      Left ovary:  1 4 x 1 0 x 1 4 cm  0 9 mL  No suspicious left ovarian abnormality  Doppler flow within normal limits      No suspicious adnexal mass or loculated collections    There is no free fluid      IMPRESSION:     Abnormally expanded endometrial cavity containing heterogeneous mass with internal vascularity and blood products  Differential diagnosis includes endometrial neoplasm, endometrial hyperplasia versus less likely degenerated intracavitary myoma  The study was marked in [facility] for immediate notification  A CT scan of the abdomen and pelvis revealed the following:     IMPRESSION:  1  Abnormal appearance of the uterus as described above  Correlate for endometrial hyperplasia versus endometrial neoplasm  Recommend follow-up GYN oncology consultation and/or routine pelvic ultrasound      2  Colonic diverticulosis      3   Large hiatal hernia/partially intrathoracic stomach  If warranted, consider follow-up GI and/or surgical consultation      She subsequently underwent a Pipelle biopsy which reveals the following:  Final Diagnosis  A  Endometrium, EMB:  - Markedly atypical glandular and stromal elements, suspicious for carcinosarcoma  See comment  Comment: There are superficial strips of atypical glandular elements which are diffusely positive for p53 by immunohistochemistry  Additionally, the the stromal elements demonstrate significant nuclear pleomorphism  The overall findings are suspicious for  Carcinosarcoma  Today, the patient is doing well  She denies significant abdominal pain, pelvic pain, nausea, vomiting, constipation, diarrhea, fevers, chills, or vaginal bleeding  Review of Systems   Constitutional: Negative  HENT: Negative  Eyes: Negative  Respiratory: Negative  Cardiovascular: Negative  Gastrointestinal: Negative  Endocrine: Negative  Genitourinary: Positive for vaginal bleeding  Musculoskeletal: Negative  Skin: Negative  Neurological: Negative  Hematological: Negative  Psychiatric/Behavioral: Negative          Current Outpatient Medications   Medication Sig Dispense Refill    cholecalciferol (VITAMIN D3) 1,000 units tablet Take 1,000 Units by mouth daily      cyanocobalamin (VITAMIN B-12) 100 MCG tablet Take 100 mcg by mouth daily      escitalopram (LEXAPRO) 10 mg tablet Take 1 tablet (10 mg total) by mouth daily 90 tablet 3    ferrous sulfate 325 (65 Fe) mg tablet Take 325 mg by mouth daily with breakfast      Multiple Vitamins-Minerals (multivitamin with minerals) tablet Take 1 tablet by mouth daily      rosuvastatin (CRESTOR) 5 mg tablet Take 1 tablet (5 mg total) by mouth daily 90 tablet 3     No current facility-administered medications for this visit  Allergies   Allergen Reactions    Acetazolamide     Sulfa Antibiotics        Past Medical History:   Diagnosis Date    Hypercholesterolemia        Past Surgical History:   Procedure Laterality Date    COLONOSCOPY      complete     DILATION AND CURETTAGE OF UTERUS      x2        OB History        4    Para   2    Term   2            AB        Living           SAB        IAB        Ectopic        Multiple        Live Births                     Family History   Problem Relation Age of Onset    Lung cancer Father     Brain cancer Father     Arthritis Family     Hypertension Family         essential      Hyperlipidemia Family     Breast cancer Cousin     Endometrial cancer Neg Hx     Ovarian cancer Neg Hx     Colon cancer Neg Hx        The following portions of the patient's history were reviewed and updated as appropriate: allergies, current medications, past family history, past medical history, past surgical history and problem list       Objective:    Blood pressure 108/60, pulse 74, temperature (!) 97 1 °F (36 2 °C), temperature source Temporal, resp  rate 16, height 5' 1 5" (1 562 m), weight 59 4 kg (131 lb), SpO2 98 %, not currently breastfeeding  Body mass index is 24 35 kg/m²  Physical Exam  Constitutional:       Appearance: She is well-developed  HENT:      Head: Normocephalic and atraumatic  Eyes:      Pupils: Pupils are equal, round, and reactive to light  Cardiovascular:      Rate and Rhythm: Normal rate and regular rhythm        Heart sounds: Normal heart sounds  Pulmonary:      Effort: Pulmonary effort is normal  No respiratory distress  Breath sounds: Normal breath sounds  Chest:   Breasts:      Right: No supraclavicular adenopathy  Left: No supraclavicular adenopathy  Abdominal:      General: Bowel sounds are normal  There is no distension  Palpations: Abdomen is soft  Abdomen is not rigid  Tenderness: There is no abdominal tenderness  There is no guarding or rebound  Genitourinary:     Comments: -Normal external female genitalia, normal [place]'s and [place]'s glands                  -Normal midline urethral meatus  No lesions notes                  -Bladder without fullness mass or tenderness                  -Vagina without lesion or discharge No significant cystocele or rectocele noted                  -Cervix normal appearing without visible lesions                  -Uterus with normal contour, mobility  No tenderness,                  -Adnexae without  mass or tenderness                  - Anus without fissure of lesion    Musculoskeletal:         General: Normal range of motion  Cervical back: Normal range of motion and neck supple  Lymphadenopathy:      Cervical: No cervical adenopathy  Upper Body:      Right upper body: No supraclavicular adenopathy  Left upper body: No supraclavicular adenopathy  Skin:     General: Skin is warm and dry  Neurological:      Mental Status: She is alert and oriented to person, place, and time     Psychiatric:         Behavior: Behavior normal            No results found for:   Lab Results   Component Value Date    WBC 8 39 06/26/2022    HGB 13 3 06/26/2022    HCT 42 7 06/26/2022    MCV 88 06/26/2022     06/26/2022     Lab Results   Component Value Date    K 4 5 06/26/2022     06/26/2022    CO2 27 06/26/2022    BUN 15 06/26/2022    CREATININE 0 62 06/26/2022    GLUF 75 03/17/2022    CALCIUM 9 0 06/26/2022    AST 20 03/17/2022    ALT 20 03/17/2022 ALKPHOS 66 03/17/2022    EGFR 92 06/26/2022        Trend:  No results found for:

## 2022-08-03 NOTE — H&P (VIEW-ONLY)
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  This appears to be early stage by clinical exam   We have discussed treatment options for this disease including chemotherapy, radiation therapy, and hormonal management  I have stated that medical evidence indicates that at this point to surgical management is her best option  I have discussed also the risks and benefits of lymph node biopsy at the time of surgery  We have discussed open versus laparoscopic versus robotic approach and have recommended the following:    Robotically assisted total laparoscopic hysterectomy bilateral salpingo-oophorectomy with sentinel lymph node biopsy  Have discussed risks and benefits of the procedure including bleeding requiring transfusion infection, infection, damage to local structures including bowel bladder ureter and other local organs  We discussed the risk of deep venous thrombosis  All of these complications are in the 2-4% range of likelihood  An open procedure may be required  The patient understands the risks and benefits of the procedure and has signed an informed consent  I personally signed the consent form with her  She does understand that further treatment including chemotherapy radiation therapy or hormones may be required based on the final postoperative pathologic diagnosis and staging  Standard preoperative testing including type and screen is CBC CPM P chest x-ray and EKG will be ordered  Any appropriate consultations for preoperative evaluation will also be ordered  Overall consultation took 60 min with greater than 50% in dedicated toward discussion time             Other Visit Diagnoses     Uterine carcinosarcoma ([facility] [address] )    -  Primary    Relevant Orders    Case request operating room: HYSTERECTOMY LAPAROSCOPIC TOTAL ([address]) bilateral salpingo-oophorectomy sentinel lymph node mapping and biopsy W/ ROBOTICS (Completed)    Type and screen    Comprehensive metabolic panel    CBC and differential    HEMOGLOBIN A1C W/ EAG ESTIMATION    EKG 12 lead    XR chest pa & lateral              CHIEF COMPLAINT:  Postmenopausal bleeding likely uterine carcinosarcoma    Subjective:     Problem:  Cancer Staging  No matching staging information was found for the patient  Previous therapy:  Oncology History    No history exists  Patient ID: Martin Moya is a 69 y o  female  Patient is very pleasant 75-year-old female with a history of postmenopausal bleeding  She underwent pelvic ultrasound which reveals the following:  UTERUS:  The uterus is anteverted in position, measuring 11 2 x 4 8 x 6 1 cm  The uterus has a normal contour and echotexture  The cervix appears within normal limits      ENDOMETRIUM:    The endometrial echo complex has an AP caliber of 46 0 mm  Heterogeneous echogenic area measuring 5 8 x 4 6 x 4 8 the centimeters was measured in the endometrial cavity  Some areas show increased vascularity with other echogenic areas without vascularity which could be secondary to blood products  There is abnormal heterogeneous the endometrium with cystic areas and areas of increased vascularity  Findings are highly concerning for the presence of endometrial hyperplasia versus neoplasm  Degenerated intracavitary myoma is in the differential     Additional evaluation necessary      OVARIES/ADNEXA:  Right ovary:  1 5 x 1 1 x 0 9 cm  0 8 mL  No suspicious right ovarian abnormality  Doppler flow within normal limits      Left ovary:  1 4 x 1 0 x 1 4 cm  0 9 mL  No suspicious left ovarian abnormality  Doppler flow within normal limits      No suspicious adnexal mass or loculated collections    There is no free fluid      IMPRESSION:     Abnormally expanded endometrial cavity containing heterogeneous mass with internal vascularity and blood products  Differential diagnosis includes endometrial neoplasm, endometrial hyperplasia versus less likely degenerated intracavitary myoma  The study was marked in [facility] for immediate notification  A CT scan of the abdomen and pelvis revealed the following:     IMPRESSION:  1  Abnormal appearance of the uterus as described above  Correlate for endometrial hyperplasia versus endometrial neoplasm  Recommend follow-up GYN oncology consultation and/or routine pelvic ultrasound      2  Colonic diverticulosis      3   Large hiatal hernia/partially intrathoracic stomach  If warranted, consider follow-up GI and/or surgical consultation      She subsequently underwent a Pipelle biopsy which reveals the following:  Final Diagnosis  A  Endometrium, EMB:  - Markedly atypical glandular and stromal elements, suspicious for carcinosarcoma  See comment  Comment: There are superficial strips of atypical glandular elements which are diffusely positive for p53 by immunohistochemistry  Additionally, the the stromal elements demonstrate significant nuclear pleomorphism  The overall findings are suspicious for  Carcinosarcoma  Today, the patient is doing well  She denies significant abdominal pain, pelvic pain, nausea, vomiting, constipation, diarrhea, fevers, chills, or vaginal bleeding  Review of Systems   Constitutional: Negative  HENT: Negative  Eyes: Negative  Respiratory: Negative  Cardiovascular: Negative  Gastrointestinal: Negative  Endocrine: Negative  Genitourinary: Positive for vaginal bleeding  Musculoskeletal: Negative  Skin: Negative  Neurological: Negative  Hematological: Negative  Psychiatric/Behavioral: Negative          Current Outpatient Medications   Medication Sig Dispense Refill    cholecalciferol (VITAMIN D3) 1,000 units tablet Take 1,000 Units by mouth daily      cyanocobalamin (VITAMIN B-12) 100 MCG tablet Take 100 mcg by mouth daily      escitalopram (LEXAPRO) 10 mg tablet Take 1 tablet (10 mg total) by mouth daily 90 tablet 3    ferrous sulfate 325 (65 Fe) mg tablet Take 325 mg by mouth daily with breakfast      Multiple Vitamins-Minerals (multivitamin with minerals) tablet Take 1 tablet by mouth daily      rosuvastatin (CRESTOR) 5 mg tablet Take 1 tablet (5 mg total) by mouth daily 90 tablet 3     No current facility-administered medications for this visit  Allergies   Allergen Reactions    Acetazolamide     Sulfa Antibiotics        Past Medical History:   Diagnosis Date    Hypercholesterolemia        Past Surgical History:   Procedure Laterality Date    COLONOSCOPY      complete     DILATION AND CURETTAGE OF UTERUS      x2        OB History        4    Para   2    Term   2            AB        Living           SAB        IAB        Ectopic        Multiple        Live Births                     Family History   Problem Relation Age of Onset    Lung cancer Father     Brain cancer Father     Arthritis Family     Hypertension Family         essential      Hyperlipidemia Family     Breast cancer Cousin     Endometrial cancer Neg Hx     Ovarian cancer Neg Hx     Colon cancer Neg Hx        The following portions of the patient's history were reviewed and updated as appropriate: allergies, current medications, past family history, past medical history, past surgical history and problem list       Objective:    Blood pressure 108/60, pulse 74, temperature (!) 97 1 °F (36 2 °C), temperature source Temporal, resp  rate 16, height 5' 1 5" (1 562 m), weight 59 4 kg (131 lb), SpO2 98 %, not currently breastfeeding  Body mass index is 24 35 kg/m²  Physical Exam  Constitutional:       Appearance: She is well-developed  HENT:      Head: Normocephalic and atraumatic  Eyes:      Pupils: Pupils are equal, round, and reactive to light  Cardiovascular:      Rate and Rhythm: Normal rate and regular rhythm        Heart sounds: Normal heart sounds  Pulmonary:      Effort: Pulmonary effort is normal  No respiratory distress  Breath sounds: Normal breath sounds  Chest:   Breasts:      Right: No supraclavicular adenopathy  Left: No supraclavicular adenopathy  Abdominal:      General: Bowel sounds are normal  There is no distension  Palpations: Abdomen is soft  Abdomen is not rigid  Tenderness: There is no abdominal tenderness  There is no guarding or rebound  Genitourinary:     Comments: -Normal external female genitalia, normal Bartholin's and [place]'s glands                  -Normal midline urethral meatus  No lesions notes                  -Bladder without fullness mass or tenderness                  -Vagina without lesion or discharge No significant cystocele or rectocele noted                  -Cervix normal appearing without visible lesions                  -Uterus with normal contour, mobility  No tenderness,                  -Adnexae without  mass or tenderness                  - Anus without fissure of lesion    Musculoskeletal:         General: Normal range of motion  Cervical back: Normal range of motion and neck supple  Lymphadenopathy:      Cervical: No cervical adenopathy  Upper Body:      Right upper body: No supraclavicular adenopathy  Left upper body: No supraclavicular adenopathy  Skin:     General: Skin is warm and dry  Neurological:      Mental Status: She is alert and oriented to person, place, and time     Psychiatric:         Behavior: Behavior normal            No results found for:   Lab Results   Component Value Date    WBC 8 39 06/26/2022    HGB 13 3 06/26/2022    HCT 42 7 06/26/2022    MCV 88 06/26/2022     06/26/2022     Lab Results   Component Value Date    K 4 5 06/26/2022     06/26/2022    CO2 27 06/26/2022    BUN 15 06/26/2022    CREATININE 0 62 06/26/2022    GLUF 75 03/17/2022    CALCIUM 9 0 06/26/2022    AST 20 03/17/2022    ALT 20 03/17/2022 ALKPHOS 66 03/17/2022    EGFR 92 06/26/2022        Trend:  No results found for:

## 2022-08-04 ENCOUNTER — TELEPHONE (OUTPATIENT)
Dept: GYNECOLOGIC ONCOLOGY | Facility: HOSPITAL | Age: 67
End: 2022-08-04

## 2022-08-04 ENCOUNTER — TELEPHONE (OUTPATIENT)
Dept: SURGICAL ONCOLOGY | Facility: CLINIC | Age: 67
End: 2022-08-04

## 2022-08-04 NOTE — TELEPHONE ENCOUNTER
Patient came in to inform that she has been having issues with her phone and if anyone needs to reach out to her to call her son Mirta Sees    [phone]

## 2022-08-04 NOTE — TELEPHONE ENCOUNTER
Called and left several messages for patient to call regarding scheduling surgery  Dr Dale Estes request that patient have procedure done on 08/18/2022  Called to see if that date works for the patient   Will try again on 08/05/22

## 2022-08-05 ENCOUNTER — TELEPHONE (OUTPATIENT)
Dept: GYNECOLOGIC ONCOLOGY | Facility: CLINIC | Age: 67
End: 2022-08-05

## 2022-08-05 LAB
EST. AVERAGE GLUCOSE BLD GHB EST-MCNC: 111 MG/DL
HBA1C MFR BLD: 5.5 %

## 2022-08-08 DIAGNOSIS — C55 UTERINE CARCINOSARCOMA (HCC): Primary | ICD-10-CM

## 2022-08-10 ENCOUNTER — APPOINTMENT (OUTPATIENT)
Dept: LAB | Facility: CLINIC | Age: 67
End: 2022-08-10
Payer: MEDICARE

## 2022-08-10 ENCOUNTER — APPOINTMENT (OUTPATIENT)
Dept: LAB | Facility: HOSPITAL | Age: 67
End: 2022-08-10
Payer: MEDICARE

## 2022-08-10 ENCOUNTER — OFFICE VISIT (OUTPATIENT)
Dept: LAB | Facility: HOSPITAL | Age: 67
End: 2022-08-10
Payer: MEDICARE

## 2022-08-10 ENCOUNTER — HOSPITAL ENCOUNTER (OUTPATIENT)
Dept: RADIOLOGY | Facility: HOSPITAL | Age: 67
Discharge: HOME | End: 2022-08-10
Payer: MEDICARE

## 2022-08-10 ENCOUNTER — LAB REQUISITION (OUTPATIENT)
Dept: LAB | Facility: HOSPITAL | Age: 67
End: 2022-08-10
Payer: MEDICARE

## 2022-08-10 DIAGNOSIS — C55 UTERINE CARCINOSARCOMA (HCC): ICD-10-CM

## 2022-08-10 DIAGNOSIS — C55 UTERINE CARCINOSARCOMA (HCC): Primary | ICD-10-CM

## 2022-08-10 DIAGNOSIS — C55 MALIGNANT NEOPLASM OF UTERUS, PART UNSPECIFIED (HCC): ICD-10-CM

## 2022-08-10 LAB
ABO GROUP BLD: NORMAL
ATRIAL RATE: 68 BPM
ATRIAL RATE: 74 BPM
BLD GP AB SCN SERPL QL: NEGATIVE
P AXIS: -4 DEGREES
P AXIS: 37 DEGREES
PR INTERVAL: 132 MS
PR INTERVAL: 142 MS
QRS AXIS: 14 DEGREES
QRS AXIS: 17 DEGREES
QRSD INTERVAL: 70 MS
QRSD INTERVAL: 72 MS
QT INTERVAL: 408 MS
QT INTERVAL: 414 MS
QTC INTERVAL: 433 MS
QTC INTERVAL: 459 MS
RH BLD: POSITIVE
SPECIMEN EXPIRATION DATE: NORMAL
T WAVE AXIS: 28 DEGREES
T WAVE AXIS: 4 DEGREES
VENTRICULAR RATE: 68 BPM
VENTRICULAR RATE: 74 BPM

## 2022-08-10 PROCEDURE — 86901 BLOOD TYPING SEROLOGIC RH(D): CPT | Performed by: OBSTETRICS & GYNECOLOGY

## 2022-08-10 PROCEDURE — 93010 ELECTROCARDIOGRAM REPORT: CPT | Performed by: INTERNAL MEDICINE

## 2022-08-10 PROCEDURE — 36415 COLL VENOUS BLD VENIPUNCTURE: CPT

## 2022-08-10 PROCEDURE — 71046 X-RAY EXAM CHEST 2 VIEWS: CPT

## 2022-08-10 PROCEDURE — 93005 ELECTROCARDIOGRAM TRACING: CPT

## 2022-08-10 PROCEDURE — 86900 BLOOD TYPING SEROLOGIC ABO: CPT | Performed by: OBSTETRICS & GYNECOLOGY

## 2022-08-10 PROCEDURE — 86850 RBC ANTIBODY SCREEN: CPT | Performed by: OBSTETRICS & GYNECOLOGY

## 2022-08-16 NOTE — PRE-PROCEDURE INSTRUCTIONS
Medication    cholecalciferol (VITAMIN D3) 1,000 units tablet    cyanocobalamin (VITAMIN B-12) 100 MCG tablet    escitalopram (LEXAPRO) 10 mg tablet    Multiple Vitamins-Minerals (multivitamin with minerals) tablet    Multiple Vitamins-Minerals (OCUVITE PO)    Multiple Vitamins-Minerals (OCUVITE PO)    rosuvastatin (CRESTOR) 5 mg tablet    Pt instructed to stop nsaids and supplements prior to surgery  Pt verbalized understanding of shower and med instructions  Pt denies fever, sob, sore throat and cough

## 2022-08-16 NOTE — PRE-PROCEDURE INSTRUCTIONS
Medication    cholecalciferol (VITAMIN D3) 1,000 units tablet    cyanocobalamin (VITAMIN B-12) 100 MCG tablet    escitalopram (LEXAPRO) 10 mg tablet    Multiple Vitamins-Minerals (multivitamin with minerals) tablet    Multiple Vitamins-Minerals (OCUVITE PO)    Multiple Vitamins-Minerals (OCUVITE PO)    rosuvastatin (CRESTOR) 5 mg tablet

## 2022-08-17 ENCOUNTER — ANESTHESIA EVENT (OUTPATIENT)
Dept: ANESTHESIOLOGY | Facility: HOSPITAL | Age: 67
End: 2022-08-17

## 2022-08-17 ENCOUNTER — ANESTHESIA (OUTPATIENT)
Dept: ANESTHESIOLOGY | Facility: HOSPITAL | Age: 67
End: 2022-08-17

## 2022-08-18 ENCOUNTER — ANESTHESIA (OUTPATIENT)
Dept: PERIOP | Facility: HOSPITAL | Age: 67
End: 2022-08-18
Payer: MEDICARE

## 2022-08-18 ENCOUNTER — HOSPITAL ENCOUNTER (OUTPATIENT)
Facility: HOSPITAL | Age: 67
Setting detail: OUTPATIENT SURGERY
Discharge: HOME | End: 2022-08-18
Attending: OBSTETRICS & GYNECOLOGY | Admitting: OBSTETRICS & GYNECOLOGY
Payer: MEDICARE

## 2022-08-18 ENCOUNTER — ANESTHESIA EVENT (OUTPATIENT)
Dept: PERIOP | Facility: HOSPITAL | Age: 67
End: 2022-08-18
Payer: MEDICARE

## 2022-08-18 VITALS
SYSTOLIC BLOOD PRESSURE: 137 MMHG | HEART RATE: 100 BPM | DIASTOLIC BLOOD PRESSURE: 69 MMHG | HEIGHT: 61 IN | BODY MASS INDEX: 24.73 KG/M2 | OXYGEN SATURATION: 100 % | WEIGHT: 131 LBS | RESPIRATION RATE: 14 BRPM | TEMPERATURE: 97 F

## 2022-08-18 DIAGNOSIS — Z90.710 HISTORY OF ROBOT-ASSISTED LAPAROSCOPIC HYSTERECTOMY: Primary | ICD-10-CM

## 2022-08-18 DIAGNOSIS — C55 UTERINE CARCINOSARCOMA (HCC): ICD-10-CM

## 2022-08-18 LAB
ABO GROUP BLD: NORMAL
GLUCOSE SERPL-MCNC: 141 MG/DL (ref 65–140)
RH BLD: POSITIVE

## 2022-08-18 PROCEDURE — 38900 IO MAP OF SENT LYMPH NODE: CPT | Performed by: OBSTETRICS & GYNECOLOGY

## 2022-08-18 PROCEDURE — 88342 IMHCHEM/IMCYTCHM 1ST ANTB: CPT | Performed by: PATHOLOGY

## 2022-08-18 PROCEDURE — 88361 TUMOR IMMUNOHISTOCHEM/COMPUT: CPT | Performed by: PATHOLOGY

## 2022-08-18 PROCEDURE — 38571 LAPAROSCOPY LYMPHADENECTOMY: CPT | Performed by: OBSTETRICS & GYNECOLOGY

## 2022-08-18 PROCEDURE — NC001 PR NO CHARGE: Performed by: PHYSICIAN ASSISTANT

## 2022-08-18 PROCEDURE — 88309 TISSUE EXAM BY PATHOLOGIST: CPT | Performed by: PATHOLOGY

## 2022-08-18 PROCEDURE — 88307 TISSUE EXAM BY PATHOLOGIST: CPT | Performed by: PATHOLOGY

## 2022-08-18 PROCEDURE — S2900 ROBOTIC SURGICAL SYSTEM: HCPCS | Performed by: OBSTETRICS & GYNECOLOGY

## 2022-08-18 PROCEDURE — 88112 CYTOPATH CELL ENHANCE TECH: CPT | Performed by: PATHOLOGY

## 2022-08-18 PROCEDURE — 88341 IMHCHEM/IMCYTCHM EA ADD ANTB: CPT | Performed by: PATHOLOGY

## 2022-08-18 PROCEDURE — 58571 TLH W/T/O 250 G OR LESS: CPT | Performed by: OBSTETRICS & GYNECOLOGY

## 2022-08-18 PROCEDURE — 82948 REAGENT STRIP/BLOOD GLUCOSE: CPT

## 2022-08-18 PROCEDURE — 88344 IMHCHEM/IMCYTCHM EA MLT ANTB: CPT | Performed by: PATHOLOGY

## 2022-08-18 PROCEDURE — 88305 TISSUE EXAM BY PATHOLOGIST: CPT | Performed by: PATHOLOGY

## 2022-08-18 RX ORDER — ACETAMINOPHEN 325 MG/1
975 TABLET ORAL ONCE
Status: DISCONTINUED | OUTPATIENT
Start: 2022-08-18 | End: 2022-08-18 | Stop reason: HOSPADM

## 2022-08-18 RX ORDER — OXYCODONE HYDROCHLORIDE 5 MG/1
5 TABLET ORAL EVERY 4 HOURS PRN
Status: DISCONTINUED | OUTPATIENT
Start: 2022-08-18 | End: 2022-08-18 | Stop reason: HOSPADM

## 2022-08-18 RX ORDER — HYDROMORPHONE HCL/PF 1 MG/ML
SYRINGE (ML) INJECTION AS NEEDED
Status: DISCONTINUED | OUTPATIENT
Start: 2022-08-18 | End: 2022-08-18

## 2022-08-18 RX ORDER — HYDROMORPHONE HCL/PF 1 MG/ML
0.5 SYRINGE (ML) INJECTION
Status: DISCONTINUED | OUTPATIENT
Start: 2022-08-18 | End: 2022-08-18 | Stop reason: HOSPADM

## 2022-08-18 RX ORDER — IBUPROFEN 400 MG/1
600 TABLET ORAL EVERY 6 HOURS PRN
Status: DISCONTINUED | OUTPATIENT
Start: 2022-08-18 | End: 2022-08-18 | Stop reason: HOSPADM

## 2022-08-18 RX ORDER — FENTANYL CITRATE/PF 50 MCG/ML
25 SYRINGE (ML) INJECTION
Status: DISCONTINUED | OUTPATIENT
Start: 2022-08-18 | End: 2022-08-18 | Stop reason: HOSPADM

## 2022-08-18 RX ORDER — OXYCODONE HYDROCHLORIDE 5 MG/1
TABLET ORAL
Qty: 10 TABLET | Refills: 0 | Status: SHIPPED | OUTPATIENT
Start: 2022-08-18 | End: 2022-10-05 | Stop reason: ALTCHOICE

## 2022-08-18 RX ORDER — GLYCOPYRROLATE 0.2 MG/ML
INJECTION INTRAMUSCULAR; INTRAVENOUS AS NEEDED
Status: DISCONTINUED | OUTPATIENT
Start: 2022-08-18 | End: 2022-08-18

## 2022-08-18 RX ORDER — PROPOFOL 10 MG/ML
INJECTION, EMULSION INTRAVENOUS AS NEEDED
Status: DISCONTINUED | OUTPATIENT
Start: 2022-08-18 | End: 2022-08-18

## 2022-08-18 RX ORDER — LIDOCAINE HYDROCHLORIDE 10 MG/ML
INJECTION, SOLUTION EPIDURAL; INFILTRATION; INTRACAUDAL; PERINEURAL AS NEEDED
Status: DISCONTINUED | OUTPATIENT
Start: 2022-08-18 | End: 2022-08-18

## 2022-08-18 RX ORDER — BUPIVACAINE HYDROCHLORIDE 2.5 MG/ML
INJECTION, SOLUTION EPIDURAL; INFILTRATION; INTRACAUDAL AS NEEDED
Status: DISCONTINUED | OUTPATIENT
Start: 2022-08-18 | End: 2022-08-18 | Stop reason: HOSPADM

## 2022-08-18 RX ORDER — ROCURONIUM BROMIDE 10 MG/ML
INJECTION, SOLUTION INTRAVENOUS AS NEEDED
Status: DISCONTINUED | OUTPATIENT
Start: 2022-08-18 | End: 2022-08-18

## 2022-08-18 RX ORDER — MEPERIDINE HYDROCHLORIDE 25 MG/ML
12.5 INJECTION INTRAMUSCULAR; INTRAVENOUS; SUBCUTANEOUS ONCE
Status: DISCONTINUED | OUTPATIENT
Start: 2022-08-18 | End: 2022-08-18 | Stop reason: HOSPADM

## 2022-08-18 RX ORDER — MIDAZOLAM HYDROCHLORIDE 2 MG/2ML
INJECTION, SOLUTION INTRAMUSCULAR; INTRAVENOUS AS NEEDED
Status: DISCONTINUED | OUTPATIENT
Start: 2022-08-18 | End: 2022-08-18

## 2022-08-18 RX ORDER — OXYCODONE HYDROCHLORIDE 10 MG/1
10 TABLET ORAL EVERY 4 HOURS PRN
Status: DISCONTINUED | OUTPATIENT
Start: 2022-08-18 | End: 2022-08-18 | Stop reason: HOSPADM

## 2022-08-18 RX ORDER — ACETAMINOPHEN 325 MG/1
650 TABLET ORAL EVERY 6 HOURS PRN
Status: DISCONTINUED | OUTPATIENT
Start: 2022-08-18 | End: 2022-08-18 | Stop reason: HOSPADM

## 2022-08-18 RX ORDER — CEFAZOLIN SODIUM 2 G/50ML
2000 SOLUTION INTRAVENOUS ONCE
Status: COMPLETED | OUTPATIENT
Start: 2022-08-18 | End: 2022-08-18

## 2022-08-18 RX ORDER — PROPOFOL 10 MG/ML
INJECTION, EMULSION INTRAVENOUS CONTINUOUS PRN
Status: DISCONTINUED | OUTPATIENT
Start: 2022-08-18 | End: 2022-08-18

## 2022-08-18 RX ORDER — GABAPENTIN 100 MG/1
100 CAPSULE ORAL ONCE
Status: DISCONTINUED | OUTPATIENT
Start: 2022-08-18 | End: 2022-08-18 | Stop reason: HOSPADM

## 2022-08-18 RX ORDER — ONDANSETRON 2 MG/ML
INJECTION INTRAMUSCULAR; INTRAVENOUS AS NEEDED
Status: DISCONTINUED | OUTPATIENT
Start: 2022-08-18 | End: 2022-08-18

## 2022-08-18 RX ORDER — KETOROLAC TROMETHAMINE 30 MG/ML
INJECTION, SOLUTION INTRAMUSCULAR; INTRAVENOUS AS NEEDED
Status: DISCONTINUED | OUTPATIENT
Start: 2022-08-18 | End: 2022-08-18

## 2022-08-18 RX ORDER — SODIUM CHLORIDE, SODIUM LACTATE, POTASSIUM CHLORIDE, CALCIUM CHLORIDE 600; 310; 30; 20 MG/100ML; MG/100ML; MG/100ML; MG/100ML
125 INJECTION, SOLUTION INTRAVENOUS CONTINUOUS
Status: DISCONTINUED | OUTPATIENT
Start: 2022-08-18 | End: 2022-08-18 | Stop reason: HOSPADM

## 2022-08-18 RX ORDER — ONDANSETRON 2 MG/ML
4 INJECTION INTRAMUSCULAR; INTRAVENOUS ONCE AS NEEDED
Status: DISCONTINUED | OUTPATIENT
Start: 2022-08-18 | End: 2022-08-18 | Stop reason: HOSPADM

## 2022-08-18 RX ORDER — LABETALOL HYDROCHLORIDE 5 MG/ML
5 INJECTION, SOLUTION INTRAVENOUS
Status: DISCONTINUED | OUTPATIENT
Start: 2022-08-18 | End: 2022-08-18 | Stop reason: HOSPADM

## 2022-08-18 RX ORDER — ALBUTEROL SULFATE 2.5 MG/3ML
2.5 SOLUTION RESPIRATORY (INHALATION) ONCE AS NEEDED
Status: DISCONTINUED | OUTPATIENT
Start: 2022-08-18 | End: 2022-08-18 | Stop reason: HOSPADM

## 2022-08-18 RX ORDER — FENTANYL CITRATE 50 UG/ML
INJECTION, SOLUTION INTRAMUSCULAR; INTRAVENOUS AS NEEDED
Status: DISCONTINUED | OUTPATIENT
Start: 2022-08-18 | End: 2022-08-18

## 2022-08-18 RX ORDER — DEXAMETHASONE SODIUM PHOSPHATE 10 MG/ML
INJECTION, SOLUTION INTRAMUSCULAR; INTRAVENOUS AS NEEDED
Status: DISCONTINUED | OUTPATIENT
Start: 2022-08-18 | End: 2022-08-18

## 2022-08-18 RX ORDER — PROMETHAZINE HYDROCHLORIDE 25 MG/ML
12.5 INJECTION, SOLUTION INTRAMUSCULAR; INTRAVENOUS ONCE AS NEEDED
Status: DISCONTINUED | OUTPATIENT
Start: 2022-08-18 | End: 2022-08-18 | Stop reason: HOSPADM

## 2022-08-18 RX ORDER — SODIUM CHLORIDE, SODIUM LACTATE, POTASSIUM CHLORIDE, CALCIUM CHLORIDE 600; 310; 30; 20 MG/100ML; MG/100ML; MG/100ML; MG/100ML
INJECTION, SOLUTION INTRAVENOUS CONTINUOUS PRN
Status: DISCONTINUED | OUTPATIENT
Start: 2022-08-18 | End: 2022-08-18

## 2022-08-18 RX ORDER — INDOCYANINE GREEN AND WATER 25 MG
KIT INJECTION AS NEEDED
Status: DISCONTINUED | OUTPATIENT
Start: 2022-08-18 | End: 2022-08-18 | Stop reason: HOSPADM

## 2022-08-18 RX ORDER — ENOXAPARIN SODIUM 100 MG/ML
40 INJECTION SUBCUTANEOUS
Status: COMPLETED | OUTPATIENT
Start: 2022-08-18 | End: 2022-08-18

## 2022-08-18 RX ADMIN — ONDANSETRON 4 MG: 2 INJECTION INTRAMUSCULAR; INTRAVENOUS at 12:31

## 2022-08-18 RX ADMIN — KETOROLAC TROMETHAMINE 30 MG: 30 INJECTION, SOLUTION INTRAMUSCULAR at 15:16

## 2022-08-18 RX ADMIN — ENOXAPARIN SODIUM 40 MG: 40 INJECTION SUBCUTANEOUS at 12:28

## 2022-08-18 RX ADMIN — LIDOCAINE HYDROCHLORIDE 50 MG: 10 INJECTION, SOLUTION EPIDURAL; INFILTRATION; INTRACAUDAL; PERINEURAL at 12:35

## 2022-08-18 RX ADMIN — HYDROMORPHONE HYDROCHLORIDE 0.5 MG: 1 INJECTION, SOLUTION INTRAMUSCULAR; INTRAVENOUS; SUBCUTANEOUS at 15:23

## 2022-08-18 RX ADMIN — DEXAMETHASONE SODIUM PHOSPHATE 10 MG: 10 INJECTION, SOLUTION INTRAMUSCULAR; INTRAVENOUS at 12:38

## 2022-08-18 RX ADMIN — ROCURONIUM BROMIDE 10 MG: 50 INJECTION INTRAVENOUS at 13:40

## 2022-08-18 RX ADMIN — SODIUM CHLORIDE, SODIUM LACTATE, POTASSIUM CHLORIDE, AND CALCIUM CHLORIDE: .6; .31; .03; .02 INJECTION, SOLUTION INTRAVENOUS at 12:31

## 2022-08-18 RX ADMIN — SUGAMMADEX 119 MG: 100 INJECTION, SOLUTION INTRAVENOUS at 15:16

## 2022-08-18 RX ADMIN — GLYCOPYRROLATE 0.2 MG: 0.2 INJECTION, SOLUTION INTRAMUSCULAR; INTRAVENOUS at 12:58

## 2022-08-18 RX ADMIN — MIDAZOLAM 2 MG: 1 INJECTION INTRAMUSCULAR; INTRAVENOUS at 12:31

## 2022-08-18 RX ADMIN — FENTANYL CITRATE 50 MCG: 50 INJECTION INTRAMUSCULAR; INTRAVENOUS at 12:58

## 2022-08-18 RX ADMIN — HYDROMORPHONE HYDROCHLORIDE 0.5 MG: 1 INJECTION, SOLUTION INTRAMUSCULAR; INTRAVENOUS; SUBCUTANEOUS at 15:00

## 2022-08-18 RX ADMIN — FENTANYL CITRATE 50 MCG: 50 INJECTION INTRAMUSCULAR; INTRAVENOUS at 13:15

## 2022-08-18 RX ADMIN — ROCURONIUM BROMIDE 50 MG: 50 INJECTION INTRAVENOUS at 12:35

## 2022-08-18 RX ADMIN — FENTANYL CITRATE 50 MCG: 50 INJECTION INTRAMUSCULAR; INTRAVENOUS at 14:18

## 2022-08-18 RX ADMIN — FENTANYL CITRATE 50 MCG: 50 INJECTION INTRAMUSCULAR; INTRAVENOUS at 12:35

## 2022-08-18 RX ADMIN — PROPOFOL 150 MG: 10 INJECTION, EMULSION INTRAVENOUS at 12:35

## 2022-08-18 RX ADMIN — CEFAZOLIN SODIUM 2000 MG: 2 SOLUTION INTRAVENOUS at 12:50

## 2022-08-18 NOTE — ANESTHESIA PREPROCEDURE EVALUATION
Procedure:  HYSTERECTOMY LAPAROSCOPIC TOTAL ([address] [place] [address]) W/ ROBOTICS, BILATERAL SALPINGO-OOPHORECTOMY, SENTINEL LYMPH NODE MAPPING AND BIOPSY (N/A Abdomen)    Relevant Problems   CARDIO   (+) Hyperlipidemia, mixed      GYN   (+) History of robot-assisted laparoscopic hysterectomy   (+) Malignant neoplasm of endometrium (HCC)      NEURO/PSYCH   (+) Anxiety   (+) Situational anxiety        Physical Exam    Airway    Mallampati score: I  TM Distance: >3 FB  Neck ROM: full     Dental   No notable dental hx     Cardiovascular  Cardiovascular exam normal    Pulmonary  Pulmonary exam normal     Other Findings        Anesthesia Plan  ASA Score- 2     Anesthesia Type- general with ASA Monitors  Additional Monitors:   Airway Plan: ETT  Plan Factors-Exercise tolerance (METS): >4 METS  Chart reviewed  Existing labs reviewed  Patient summary reviewed  Patient is not a current smoker  Induction- intravenous  Postoperative Plan- Plan for postoperative opioid use  Planned trial extubation    Informed Consent- Anesthetic plan and risks discussed with patient  I personally reviewed this patient with the CRNA  Discussed and agreed on the Anesthesia Plan with the CRNA  Tr Chu

## 2022-08-18 NOTE — DISCHARGE INSTRUCTIONS
[facility]  Maria Esther Black and Freya  [phone]    Hysterectomy Discharge Instructions    WHAT YOU NEED TO KNOW:   A hysterectomy is surgery to remove your uterus  Your ovaries, fallopian tubes, cervix, or part of your vagina may also need to be removed  The organs and tissue that will be removed depends on your medical condition  After a hysterectomy, you will not be able to become pregnant  If your ovaries are removed, you will go through menopause if you have not already  DISCHARGE INSTRUCTIONS:   Contact your doctor at the number above if:   You have a fever over 101o  You have nausea or are vomiting that does not improve after a light meal    Your pain is getting worse, even after you take medicine  You feel pain or burning when you urinate, or you have trouble urinating  You have pus or a foul-smelling odor coming from your vagina  Your wound is red, swollen, or draining pus  You see new or an increased amount of bright red blood coming from your vagina or your incisions  You have questions or concerns about your condition or care  Seek care immediately:   Your arm or leg feels warm, tender, and painful  It may look swollen and red  You have increasing abdominal or pelvic pain  You have heavy vaginal bleeding that fills 1 or more sanitary pads in 1 hour  Call 911 for any of the following: You feel lightheaded, short of breath, and have chest pain  You cough up blood  Medicines: You may need any of the following:  Prescription medicine may be given  You may receive a prescription for pain medication or be advised to use over the counter (OTC) pain medication such as acetaminophen (Tylenol) or ibuprofen (Advil, Motrin)  Ask your healthcare provider how to take this medicine safely  NSAIDs , such as ibuprofen, help decrease swelling, pain, and fever   NSAIDs can cause stomach bleeding or kidney problems in certain people  If you take blood thinner medicine, always ask your healthcare provider if NSAIDs are safe for you  Always read the medicine label and follow directions  Stool softeners help treat or prevent constipation  Take your medicine as directed  Contact your healthcare provider if you think your medicine is not helping or if you have side effects  Tell him or her if you are allergic to any medicine  Keep a list of the medicines, vitamins, and herbs you take  Include the amounts, and when and why you take them  Bring the list or the pill bottles to follow-up visits  Carry your medicine list with you in case of an emergency  Activity:   Rest as needed  Get up and move around as directed to help prevent blood clots  Start with short walks and slowly increase the distance every day  Limit the number of times you climb stairs to 2 times each day for the first week  Plan most of your daily activities on one level of your home  Do not lift objects heavier than 10 pounds for 6 weeks  Avoid strenuous activity for 2 weeks  Do not strain during bowel movements  High-fiber foods and extra liquids can help you prevent constipation  Examples of high-fiber foods are fruit and bran  Prune juice and water are good liquids to drink  Do not have sex, use tampons, or douche for up to 8 weeks  You may shower as soon as the day after surgery  Tub baths can be taken starting 2 weeks after surgery  Do not go into pools or hot tubs until cleared by your doctor  Ask when it is safe for you to drive  It is generally safe to drive after 2 weeks and when no longer taking prescription pain medication  Ask when you may return to work and to other regular activities  Wound care: Care for your abdominal incisions as directed  Carefully wash around the wound with soap and water   If you have Hibiclens or medicated soap that you were instructed to use before surgery, you may use that to wash with for up to 2 days after surgery  If not, any mild non-scented, non-abrasive soap is safe  It is okay to let the soap and water run over your incision  Do not scrub your incision  Dry the area and put on new, clean bandages as directed  Change your bandages when they get wet or dirty  If you have strips of medical tape, let them fall off on their own  It may take 7 to 14 days for them to fall off  Check your incision every day for redness, swelling, or pus  Deep breathing: Take deep breaths and cough 10 times each hour  This will decrease your risk for a lung infection  Take a deep breath and hold it for as long as you can  Let the air out and then cough strongly  Deep breaths help open your airway  You may be given an incentive spirometer to help you take deep breaths  Put the plastic piece in your mouth and take a slow, deep breath, then let the air out and cough  Repeat these steps 10 times every hour  Get support: This surgery may be life-changing for you and your family  You will no longer be able to get pregnant  Sudden changes in the levels of your hormones may occur and cause mood swings and depression  You may feel angry, sad, or frightened, or cry frequently and unexpectedly  These feelings are normal  Talk to your healthcare provider about where you can get support  You can also ask if hormone replacement medicine is right for you  Follow up with your healthcare provider or gynecologist as directed: You may need to return to have stitches removed, and for other tests  Write down your questions so you remember to ask them during your visits  © 2017 [employer] Information is for End User's use only and may not be sold, redistributed or otherwise used for commercial purposes  All illustrations and images included in CareNotes® are the copyrighted property of A D A M , Inc  or [employer]  The above information is an  only   It is not intended as medical advice for individual conditions or treatments  Talk to your doctor, nurse or pharmacist before following any medical regimen to see if it is safe and effective for you

## 2022-08-18 NOTE — INTERVAL H&P NOTE
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

## 2022-08-18 NOTE — ANESTHESIA POSTPROCEDURE EVALUATION
Post-Op Assessment Note    CV Status:  Stable    Pain management: adequate     Mental Status:  Alert and awake   Hydration Status:  Euvolemic   PONV Controlled:  Controlled   Airway Patency:  Patent      Post Op Vitals Reviewed: Yes      Staff: CRNA         No complications documented      BP   145/75   Temp   97 4   Pulse  69   Resp   14   SpO2   98

## 2022-08-18 NOTE — OP NOTE
OPERATIVE REPORT  PATIENT NAME: Henry Arenas    :  1955  MRN: [id number]  Pt Location: [facility] OR ROOM 15    SURGERY DATE: 2022    Surgeon(s) and Role:     * Miguelina Mcdaniel MD - Primary     * Marianna Franco PA-C - Assisting     * Hakeem Christian MD - Assisting     * Deb Rousseau MD - Assisting    Preop Diagnosis:  Uterine carcinosarcoma [facility]) Elidia Campos    Post-Op Diagnosis Codes:     * Uterine carcinosarcoma ([facility] [address] ) Elidia Campos    Procedure(s) (LRB):  HYSTERECTOMY LAPAROSCOPIC TOTAL ([address]) W/ ROBOTICS, BILATERAL SALPINGO-OOPHORECTOMY, SENTINEL LYMPH NODE MAPPING AND BIOPSY (N/A)    Specimen(s):  ID Type Source Tests Collected by Time Destination   1 :  Washing Pelvic Washing NON-GYNECOLOGIC CYTOLOGY Miguelina Mcdaniel MD 2022 1317    2 : right pelvic Tissue Lymph Node, Cinebar TISSUE EXAM Miguelina Mcdaniel MD 2022 1336    3 : left pelvic Tissue Lymph Node, Cinebar TISSUE EXAM Miguelina Mcdaniel MD 2022 1338    4 : and cervix Tissue Uterus w/Bilateral Ovaries and Fallopian Tubes TISSUE EXAM Miguelina Mcdaniel MD 2022 1421        Estimated Blood Loss:   Minimal    Drains:  [REMOVED] Urethral Catheter Non-latex 16 Fr  (Removed)   Number of days: 0       Anesthesia Type:   General    Operative Indications:  Uterine carcinosarcoma ([facility] [address] ) [C55]      Operative Findings:  UPON ENTERING THE ABDOMEN SOME SCARRING WAS NOTED TO THE SIGMOID COLON AND LEFT PELVIC SIDEWALL  THIS WAS TAKEN DOWN WITHOUT DIFFICULTY  THE UTERUS CERVIX TUBES AND OVARIES APPEARED UNREMARKABLE  THE LYMPH NODES THAT WERE MAPPED APPEARED UNREMARKABLE BILATERALLY  THE UTERUS WHILE IN THE VAGINA SPLIT RELEASING SOME TUMOR INTO THE VAGINA BUT DID NOT TRACK INTO THE ABDOMEN  Complications:   None    Procedure and Technique:    The patient was identified as herself and and appropriate time-out procedure was performed      The patient was placed in dorsal lithotomy position and prepped and draped in the usual sterile fashion including a 3 times vaginal Chlorhexadine prep  Attention was turned to the vagina where a sterile by valve speculum was placed into the vagina  The cervix was grasped with a single tooth tenaculum  I personallly injected the cervix at the 3 o'clock and 9 o'clock sites with both the deep and superficial injections of ICG with a spinal needle  Lowe catheter was placed without difficulty  An EEA sizer was then placed into the vagina without difficulty  Attention was then turned to the abdomen where planned incision sites were marked and infiltrated with 0 5% Marcaine  The periumbilical incision was created with a knife  The Veress needle was placed without difficulty  The abdomen was insufflated with sterile gas  The remainder of the incisions were created with a knife  The 8 mm trocar was placed into the sofiya umbilical incision and a camera was placed without difficulty  Under direct visualization the 3 other DaVinci 8 mm ports were placed without difficulty  A 5 mm assistant port was placed in the right lower quadrant without difficulty  The patient was placed in steep Trendelenburg position  The robot was brought in and side docked without difficulty  Electrocautery sheers were placed in the number 1 arm, a Vessel Sealer was placed in the 3  Arm, and a Prograsp was placed in the 4  Arm  I brocris scrub an approached the console  The right round ligament was taken down with the Vessel Sealer  The right pelvic sidewall was opened with cautery yordy  The right perivesical and pararectal spaces were open  The ICG was tracked using the Firefly scope  The phosphorescent lymph vessels were identified and followed  All identified sentinel nodes by ICG capture were removed and sent as numbered, side specific sentinel lymph nodes  The infundibulopelvic ligament and ureter on the right were  in this retroperitoneal space  The path of the ureter was identified    The infundibulopelvic ligament was then taken down with the Vessel Sealer in multiple bites  The posterior broad ligament was taken down with electrocautery stefani  The same procedure was performed on the left-hand side and the left round ligament was taken down in the same fashion  The left paravesicle and perirectal spaces were open, ICG lymph vessels were tracked and the sentinel lymph nodes were removed  The bladder flap was begun with the electrocautery Stefani and taken down to the upper vagina  The uterine artery pedicle was skeletonized and taken down with the Vessel Sealer  The cardinal ligament was taken down with sequential bites with the Vessel Sealer  The utererosacral ligament was taken down with the Vessel Sealer  The same procedure was then performed on the patient's left-hand side again taking care to identify the ureter prior to taking down the infundibulopelvic ligament  The vaginal cuff was then taken down with Electrocautery Stefani  The specimen was retrieved through the vagina with a single tooth tenaculum  The vaginal cuff was closed with a running suture of 2 0 Stratafix  THE VAGINA WAS THEN IRRIGATED WITH STERILE WATER  The abdomen was explored and no further bleeding sites were noted  The pneumoperitoneum was allowed to escape and no bleeding was noted  All instruments were removed  The trocars were removed  The incision sites were closed with 4 0 Monocryl without difficulty  The patient tolerated procedure without complications  The sponge and instrument counts were correct prior to closure  Hemostasis was assured prior to closure  The patient was brought to the recovery room in stable condition      Nilsa Beckett MD  Director of Cancer Survivorship  Division of [address] [place] [address] was present for the entire procedure    Patient Disposition:  PACU       SIGNATURE: Nilsa Beckett MD  DATE: August 18, 2022  TIME: 3:19 PM

## 2022-08-25 ENCOUNTER — TELEPHONE (OUTPATIENT)
Dept: GYNECOLOGIC ONCOLOGY | Facility: CLINIC | Age: 67
End: 2022-08-25

## 2022-08-25 NOTE — TELEPHONE ENCOUNTER
L/M informing the pt to contact the office I am returning her call regarding the disability she stated was drop off to [place] never received it

## 2022-08-25 NOTE — TELEPHONE ENCOUNTER
Pt called in regarding her disability ppwk that was suppose to be dropped off by lula @ the [place] office  Please advise and update pt   Thank you     [phone] Brill

## 2022-08-29 PROCEDURE — 88344 IMHCHEM/IMCYTCHM EA MLT ANTB: CPT | Performed by: PATHOLOGY

## 2022-08-29 PROCEDURE — 88342 IMHCHEM/IMCYTCHM 1ST ANTB: CPT | Performed by: PATHOLOGY

## 2022-08-29 PROCEDURE — 88341 IMHCHEM/IMCYTCHM EA ADD ANTB: CPT | Performed by: PATHOLOGY

## 2022-08-29 PROCEDURE — 88307 TISSUE EXAM BY PATHOLOGIST: CPT | Performed by: PATHOLOGY

## 2022-08-29 PROCEDURE — 88309 TISSUE EXAM BY PATHOLOGIST: CPT | Performed by: PATHOLOGY

## 2022-08-30 ENCOUNTER — DOCUMENTATION (OUTPATIENT)
Dept: GYNECOLOGIC ONCOLOGY | Facility: CLINIC | Age: 67
End: 2022-08-30

## 2022-08-30 PROCEDURE — 88112 CYTOPATH CELL ENHANCE TECH: CPT | Performed by: PATHOLOGY

## 2022-08-30 PROCEDURE — 88305 TISSUE EXAM BY PATHOLOGIST: CPT | Performed by: PATHOLOGY

## 2022-08-30 NOTE — PROGRESS NOTES
Multidisciplinary Gynecologic Oncology Tumor Case Review       Physician Recommended Plan     Dontae Salmeron is a 69 y o  female     Diagnosis: Endometrial Carcinosarcoma Stage IA     Patient was discussed at the Multidisciplinary Gynecologic Oncology Case review on 8/29/2022  The group recommended to consider treatment with  Systemic chemotherapy plus vaginal brachytherapy  Follow-up appointment with Dr Jacky Marie on 8/31/22  NCCN guidelines were available for review  The final treatment plan will be left to the discretion of the patient and the treating physician  DISCLAIMERS:    TO THE TREATING PHYSICIAN:  This conference is a meeting of clinicians from various specialty areas who evaluate and discuss patients for whom a multidisciplinary treatment approach is being considered  Please note that the above opinion was a consensus of the conference attendees and is intended only to assist in quality care of the discussed patient  The responsibility for follow up on the input given during the conference, along with any final decisions regarding plan of care, is that of the patient and the patient's provider  Accordingly, appointments have only been recommended based on this information and have NOT been scheduled unless otherwise noted  TO THE PATIENT:  This summary is a brief record of major aspects of your cancer treatment  You may choose to share a copy with any of your doctors or nurses  However, this is not a detailed or comprehensive record of your care

## 2022-08-31 ENCOUNTER — OFFICE VISIT (OUTPATIENT)
Dept: GYNECOLOGIC ONCOLOGY | Facility: CLINIC | Age: 67
End: 2022-08-31
Payer: MEDICARE

## 2022-08-31 VITALS
DIASTOLIC BLOOD PRESSURE: 60 MMHG | HEIGHT: 61 IN | OXYGEN SATURATION: 98 % | BODY MASS INDEX: 24.47 KG/M2 | HEART RATE: 95 BPM | RESPIRATION RATE: 18 BRPM | SYSTOLIC BLOOD PRESSURE: 112 MMHG | WEIGHT: 129.6 LBS | TEMPERATURE: 97.2 F

## 2022-08-31 DIAGNOSIS — C54.1 ENDOMETRIAL CANCER (HCC): Primary | ICD-10-CM

## 2022-08-31 DIAGNOSIS — C55 UTERINE CARCINOSARCOMA (HCC): ICD-10-CM

## 2022-08-31 PROCEDURE — 99215 OFFICE O/P EST HI 40 MIN: CPT | Performed by: OBSTETRICS & GYNECOLOGY

## 2022-08-31 NOTE — H&P (VIEW-ONLY)
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  I have reviewed recent data and there is nose separate recommendations for noninvasive carcinosarcoma  As such there treated as the remainder of stage I disease  I have discussed with the patient standard recommendation of 6 cycles of carboplatin area under the curve of 6 paclitaxel 175 mg meter squared followed by vaginal brachytherapy  I have discussed with the patient that there appears to be no breakout for stage I noninvasive cancer  The patient would prefer to be conservative and treat knowing that 50% of stage 1-2 cancers recur  We have therefore discussed this treatment regimen  We discussed risks and benefits of chemotherapy including low blood counts requiring transfusion and resulting in infection as well as neuropathy nephropathy nausea hair loss and malaise  The patient was given informational sheets regarding the treatment  She has signed an informed consent  I have discussed this then with both her and her son  There were both in agreement  We have also recommended a port placement and this will be ordered as well  We will recommend initiating chemotherapy sometime in the next few weeks  Other Visit Diagnoses     Endometrial cancer [facility])    -  Primary    Relevant Orders    Ambulatory Referral to Interventional Radiology      Overall consultation took 45 minutes with greater than 50% being dedicated toward discussion time  CHIEF COMPLAINT:  Treatment planning carcinosarcoma of endometrium      Problem:  Cancer Staging  Uterine carcinosarcoma [facility])  Staging form: Corpus Uteri - Carcinoma, AJCC 8th Edition  - Clinical stage from 8/26/2022:  FIGO Stage IA (cT1a, cN0, cM0) - Signed by Miguelina Mcdaniel MD on 8/26/2022        Previous therapy:  Oncology History   Uterine carcinosarcoma ([facility] [address] )   8/18/2022 Initial Diagnosis    Uterine carcinosarcoma ([facility] [address] )     8/26/2022 -  Cancer Staged    Staging form: Corpus Uteri - Carcinoma, AJCC 8th Edition  - Clinical stage from 8/26/2022: FIGO Stage IA (cT1a, cN0, cM0) - Signed by Ashley Awad MD on 8/26/2022  Histopathologic type: Carcinosarcoma  Stage prefix: Initial diagnosis  Histologic grade (G): G3  Histologic grading system: 3 grade system       8/2022 Surgery    Patient and did went robotic hysterectomy bilateral salpingo-oophorectomy sentinel lymph node mapping and biopsy for stage I A non invasive carcinosarcoma of the endometrium  The uterus did fracture within the vagina but no leakage into the abdomen was noted  Patient ID: Wendi Aguilar is a 69 y o  female  Patient is very pleasant 64-year-old female status post robotically assisted total laparoscopic hysterectomy bilateral salpingo-oophorectomy sentinel lymph node mapping and biopsy for carcinosarcoma  Final pathology reports revealing noninvasive stage IA carcinosarcoma of the endometrium  The surgery was uncomplicated the patient is doing well  She has no present complaints  Today, the patient is doing well  She denies significant abdominal pain, pelvic pain, nausea, vomiting, constipation, diarrhea, fevers, chills, or vaginal bleeding  The following portions of the patient's history were reviewed and updated as appropriate: allergies, current medications, past family history, past medical history, past surgical history and problem list     Review of Systems   Constitutional: Negative  HENT: Negative  Eyes: Negative  Respiratory: Negative  Cardiovascular: Negative  Gastrointestinal: Negative  Endocrine: Negative  Genitourinary: Negative  Musculoskeletal: Negative  Skin: Negative  Neurological: Negative  Hematological: Negative      Psychiatric/Behavioral: Negative  Current Outpatient Medications   Medication Sig Dispense Refill    apixaban (ELIQUIS) 2 5 mg Take 1 tablet (2 5 mg total) by mouth 2 (two) times a day for 28 days 56 tablet 0    cholecalciferol (VITAMIN D3) 1,000 units tablet Take 1,000 Units by mouth daily      cyanocobalamin (VITAMIN B-12) 100 MCG tablet Take 100 mcg by mouth daily      escitalopram (LEXAPRO) 10 mg tablet Take 1 tablet (10 mg total) by mouth daily 90 tablet 3    Multiple Vitamins-Minerals (multivitamin with minerals) tablet Take 1 tablet by mouth daily      Multiple Vitamins-Minerals (OCUVITE PO) Take by mouth      Multiple Vitamins-Minerals (OCUVITE PO) Take by mouth in the morning      rosuvastatin (CRESTOR) 5 mg tablet Take 1 tablet (5 mg total) by mouth daily 90 tablet 3    oxyCODONE (ROXICODONE) 5 immediate release tablet 1 -2 tabs by mouth every 4-6 hour as needed (Patient not taking: No sig reported) 10 tablet 0     No current facility-administered medications for this visit  Objective:    Blood pressure 112/60, pulse 95, temperature (!) 97 2 °F (36 2 °C), temperature source Tympanic, resp  rate 18, height 5' 1" (1 549 m), weight 58 8 kg (129 lb 9 6 oz), SpO2 98 %, not currently breastfeeding  Body mass index is 24 49 kg/m²  Body surface area is 1 57 meters squared  Physical Exam  Constitutional:       Appearance: She is well-developed  HENT:      Head: Normocephalic and atraumatic  Neck:      Thyroid: No thyromegaly  Cardiovascular:      Rate and Rhythm: Normal rate and regular rhythm  Heart sounds: Normal heart sounds  Pulmonary:      Effort: Pulmonary effort is normal       Breath sounds: Normal breath sounds  Abdominal:      General: Bowel sounds are normal       Palpations: Abdomen is soft  Comments: Well healed laparoscopic incisions     Genitourinary:     Comments: -Normal external female genitalia, normal Bartholin's and Warson Woods's glands                  -Normal midline urethral meatus  No lesions notes                  -Bladder without fullness mass or tenderness                  -Vagina without lesion or discharge No significant cystocele or rectocele noted                  -Cervix surgically absent                  -Uterus surgically absent                  -Adnexae surgically absent                  - Anus without fissure of lesion    Musculoskeletal:         General: Normal range of motion  Cervical back: Normal range of motion and neck supple  Lymphadenopathy:      Cervical: No cervical adenopathy  Skin:     General: Skin is warm and dry  Neurological:      Mental Status: She is alert and oriented to person, place, and time     Psychiatric:         Behavior: Behavior normal          No results found for:   Lab Results   Component Value Date    K 4 4 08/03/2022     08/03/2022    CO2 28 08/03/2022    BUN 17 08/03/2022    CREATININE 0 69 08/03/2022    GLUF 75 03/17/2022    CALCIUM 9 6 08/03/2022    AST 18 08/03/2022    ALT 20 08/03/2022    ALKPHOS 66 08/03/2022    EGFR 89 08/03/2022     Lab Results   Component Value Date    WBC 8 21 08/03/2022    HGB 13 3 08/03/2022    HCT 43 8 08/03/2022    MCV 89 08/03/2022     (H) 08/03/2022     Lab Results   Component Value Date    NEUTROABS 5 25 08/03/2022        Trend:  No results found for:

## 2022-08-31 NOTE — ASSESSMENT & PLAN NOTE
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  I have reviewed recent data and there is nose separate recommendations for noninvasive carcinosarcoma  As such there treated as the remainder of stage I disease  I have discussed with the patient standard recommendation of 6 cycles of carboplatin area under the curve of 6 paclitaxel 175 mg meter squared followed by vaginal brachytherapy  I have discussed with the patient that there appears to be no breakout for stage I noninvasive cancer  The patient would prefer to be conservative and treat knowing that 50% of stage 1-2 cancers recur  We have therefore discussed this treatment regimen  We discussed risks and benefits of chemotherapy including low blood counts requiring transfusion and resulting in infection as well as neuropathy nephropathy nausea hair loss and malaise  The patient was given informational sheets regarding the treatment  She has signed an informed consent  I have discussed this then with both her and her son  There were both in agreement  We have also recommended a port placement and this will be ordered as well  We will recommend initiating chemotherapy sometime in the next few weeks

## 2022-08-31 NOTE — PROGRESS NOTES
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  I have reviewed recent data and there is nose separate recommendations for noninvasive carcinosarcoma  As such there treated as the remainder of stage I disease  I have discussed with the patient standard recommendation of 6 cycles of carboplatin area under the curve of 6 paclitaxel 175 mg meter squared followed by vaginal brachytherapy  I have discussed with the patient that there appears to be no breakout for stage I noninvasive cancer  The patient would prefer to be conservative and treat knowing that 50% of stage 1-2 cancers recur  We have therefore discussed this treatment regimen  We discussed risks and benefits of chemotherapy including low blood counts requiring transfusion and resulting in infection as well as neuropathy nephropathy nausea hair loss and malaise  The patient was given informational sheets regarding the treatment  She has signed an informed consent  I have discussed this then with both her and her son  There were both in agreement  We have also recommended a port placement and this will be ordered as well  We will recommend initiating chemotherapy sometime in the next few weeks  Other Visit Diagnoses     Endometrial cancer [facility])    -  Primary    Relevant Orders    Ambulatory Referral to Interventional Radiology      Overall consultation took 45 minutes with greater than 50% being dedicated toward discussion time  CHIEF COMPLAINT:  Treatment planning carcinosarcoma of endometrium      Problem:  Cancer Staging  Uterine carcinosarcoma [facility])  Staging form: Corpus Uteri - Carcinoma, AJCC 8th Edition  - Clinical stage from 8/26/2022:  FIGO Stage IA (cT1a, cN0, cM0) - Signed by True Berrios MD on 8/26/2022        Previous therapy:  Oncology History   Uterine carcinosarcoma ([facility] [address] )   8/18/2022 Initial Diagnosis    Uterine carcinosarcoma ([facility] [address] )     8/26/2022 -  Cancer Staged    Staging form: Corpus Uteri - Carcinoma, AJCC 8th Edition  - Clinical stage from 8/26/2022: FIGO Stage IA (cT1a, cN0, cM0) - Signed by Jan Short MD on 8/26/2022  Histopathologic type: Carcinosarcoma  Stage prefix: Initial diagnosis  Histologic grade (G): G3  Histologic grading system: 3 grade system       8/2022 Surgery    Patient and did went robotic hysterectomy bilateral salpingo-oophorectomy sentinel lymph node mapping and biopsy for stage I A non invasive carcinosarcoma of the endometrium  The uterus did fracture within the vagina but no leakage into the abdomen was noted  Patient ID: Shellie Cho is a 69 y o  female  Patient is very pleasant 57-year-old female status post robotically assisted total laparoscopic hysterectomy bilateral salpingo-oophorectomy sentinel lymph node mapping and biopsy for carcinosarcoma  Final pathology reports revealing noninvasive stage IA carcinosarcoma of the endometrium  The surgery was uncomplicated the patient is doing well  She has no present complaints  Today, the patient is doing well  She denies significant abdominal pain, pelvic pain, nausea, vomiting, constipation, diarrhea, fevers, chills, or vaginal bleeding  The following portions of the patient's history were reviewed and updated as appropriate: allergies, current medications, past family history, past medical history, past surgical history and problem list     Review of Systems   Constitutional: Negative  HENT: Negative  Eyes: Negative  Respiratory: Negative  Cardiovascular: Negative  Gastrointestinal: Negative  Endocrine: Negative  Genitourinary: Negative  Musculoskeletal: Negative  Skin: Negative  Neurological: Negative  Hematological: Negative      Psychiatric/Behavioral: Negative  Current Outpatient Medications   Medication Sig Dispense Refill    apixaban (ELIQUIS) 2 5 mg Take 1 tablet (2 5 mg total) by mouth 2 (two) times a day for 28 days 56 tablet 0    cholecalciferol (VITAMIN D3) 1,000 units tablet Take 1,000 Units by mouth daily      cyanocobalamin (VITAMIN B-12) 100 MCG tablet Take 100 mcg by mouth daily      escitalopram (LEXAPRO) 10 mg tablet Take 1 tablet (10 mg total) by mouth daily 90 tablet 3    Multiple Vitamins-Minerals (multivitamin with minerals) tablet Take 1 tablet by mouth daily      Multiple Vitamins-Minerals (OCUVITE PO) Take by mouth      Multiple Vitamins-Minerals (OCUVITE PO) Take by mouth in the morning      rosuvastatin (CRESTOR) 5 mg tablet Take 1 tablet (5 mg total) by mouth daily 90 tablet 3    oxyCODONE (ROXICODONE) 5 immediate release tablet 1 -2 tabs by mouth every 4-6 hour as needed (Patient not taking: No sig reported) 10 tablet 0     No current facility-administered medications for this visit  Objective:    Blood pressure 112/60, pulse 95, temperature (!) 97 2 °F (36 2 °C), temperature source Tympanic, resp  rate 18, height 5' 1" (1 549 m), weight 58 8 kg (129 lb 9 6 oz), SpO2 98 %, not currently breastfeeding  Body mass index is 24 49 kg/m²  Body surface area is 1 57 meters squared  Physical Exam  Constitutional:       Appearance: She is well-developed  HENT:      Head: Normocephalic and atraumatic  Neck:      Thyroid: No thyromegaly  Cardiovascular:      Rate and Rhythm: Normal rate and regular rhythm  Heart sounds: Normal heart sounds  Pulmonary:      Effort: Pulmonary effort is normal       Breath sounds: Normal breath sounds  Abdominal:      General: Bowel sounds are normal       Palpations: Abdomen is soft  Comments: Well healed laparoscopic incisions     Genitourinary:     Comments: -Normal external female genitalia, normal Barth[place]'s and [place]'s glands                  -Normal midline urethral meatus  No lesions notes                  -Bladder without fullness mass or tenderness                  -Vagina without lesion or discharge No significant cystocele or rectocele noted                  -Cervix surgically absent                  -Uterus surgically absent                  -Adnexae surgically absent                  - Anus without fissure of lesion    Musculoskeletal:         General: Normal range of motion  Cervical back: Normal range of motion and neck supple  Lymphadenopathy:      Cervical: No cervical adenopathy  Skin:     General: Skin is warm and dry  Neurological:      Mental Status: She is alert and oriented to person, place, and time     Psychiatric:         Behavior: Behavior normal          No results found for:   Lab Results   Component Value Date    K 4 4 08/03/2022     08/03/2022    CO2 28 08/03/2022    BUN 17 08/03/2022    CREATININE 0 69 08/03/2022    GLUF 75 03/17/2022    CALCIUM 9 6 08/03/2022    AST 18 08/03/2022    ALT 20 08/03/2022    ALKPHOS 66 08/03/2022    EGFR 89 08/03/2022     Lab Results   Component Value Date    WBC 8 21 08/03/2022    HGB 13 3 08/03/2022    HCT 43 8 08/03/2022    MCV 89 08/03/2022     (H) 08/03/2022     Lab Results   Component Value Date    NEUTROABS 5 25 08/03/2022        Trend:  No results found for:

## 2022-09-01 DIAGNOSIS — R11.0 CHEMOTHERAPY-INDUCED NAUSEA: ICD-10-CM

## 2022-09-01 DIAGNOSIS — T45.1X5A CHEMOTHERAPY-INDUCED NAUSEA: ICD-10-CM

## 2022-09-01 DIAGNOSIS — C55 UTERINE CARCINOSARCOMA (HCC): Primary | ICD-10-CM

## 2022-09-01 DIAGNOSIS — C54.1 MALIGNANT NEOPLASM OF ENDOMETRIUM (HCC): ICD-10-CM

## 2022-09-01 RX ORDER — ONDANSETRON HYDROCHLORIDE 8 MG/1
8 TABLET, FILM COATED ORAL EVERY 8 HOURS PRN
Qty: 30 TABLET | Refills: 1 | Status: ON HOLD | OUTPATIENT
Start: 2022-09-01 | End: 2022-09-06

## 2022-09-01 RX ORDER — NALOXONE HYDROCHLORIDE 4 MG/.1ML
SPRAY NASAL
Qty: 1 EACH | Refills: 1 | Status: SHIPPED | OUTPATIENT
Start: 2022-09-01

## 2022-09-01 RX ORDER — LORAZEPAM 1 MG/1
1 TABLET ORAL EVERY 6 HOURS PRN
Qty: 36 TABLET | Refills: 1 | Status: SHIPPED | OUTPATIENT
Start: 2022-09-01

## 2022-09-02 ENCOUNTER — ANESTHESIA EVENT (OUTPATIENT)
Dept: PERIOP | Facility: AMBULARY SURGERY CENTER | Age: 67
End: 2022-09-02
Payer: MEDICARE

## 2022-09-06 ENCOUNTER — ANESTHESIA (OUTPATIENT)
Dept: PERIOP | Facility: AMBULARY SURGERY CENTER | Age: 67
End: 2022-09-06
Payer: MEDICARE

## 2022-09-06 ENCOUNTER — APPOINTMENT (OUTPATIENT)
Dept: RADIOLOGY | Facility: AMBULARY SURGERY CENTER | Age: 67
End: 2022-09-06
Payer: MEDICARE

## 2022-09-06 ENCOUNTER — HOSPITAL ENCOUNTER (OUTPATIENT)
Facility: AMBULARY SURGERY CENTER | Age: 67
Setting detail: OUTPATIENT SURGERY
Discharge: HOME | End: 2022-09-06
Attending: RADIOLOGY | Admitting: RADIOLOGY
Payer: MEDICARE

## 2022-09-06 VITALS
HEIGHT: 61 IN | TEMPERATURE: 97.1 F | OXYGEN SATURATION: 97 % | RESPIRATION RATE: 20 BRPM | DIASTOLIC BLOOD PRESSURE: 63 MMHG | HEART RATE: 91 BPM | BODY MASS INDEX: 24.17 KG/M2 | SYSTOLIC BLOOD PRESSURE: 132 MMHG | WEIGHT: 128 LBS

## 2022-09-06 DIAGNOSIS — C55 UTERINE CARCINOSARCOMA (HCC): ICD-10-CM

## 2022-09-06 DIAGNOSIS — T45.1X5A CHEMOTHERAPY-INDUCED NAUSEA: ICD-10-CM

## 2022-09-06 DIAGNOSIS — R11.0 CHEMOTHERAPY-INDUCED NAUSEA: ICD-10-CM

## 2022-09-06 PROCEDURE — 77001 FLUOROGUIDE FOR VEIN DEVICE: CPT | Performed by: RADIOLOGY

## 2022-09-06 PROCEDURE — 36561 INSERT TUNNELED CV CATH: CPT | Performed by: RADIOLOGY

## 2022-09-06 PROCEDURE — 76937 US GUIDE VASCULAR ACCESS: CPT | Performed by: RADIOLOGY

## 2022-09-06 PROCEDURE — C1788 PORT, INDWELLING, IMP: HCPCS | Performed by: RADIOLOGY

## 2022-09-06 PROCEDURE — 77001 FLUOROGUIDE FOR VEIN DEVICE: CPT

## 2022-09-06 DEVICE — PORT DIGINTY 8FR MICRO-STICK KIT HEMODIAL MID SIZE: Type: IMPLANTABLE DEVICE | Site: CHEST | Status: FUNCTIONAL

## 2022-09-06 RX ORDER — ONDANSETRON 2 MG/ML
4 INJECTION INTRAMUSCULAR; INTRAVENOUS ONCE AS NEEDED
Status: CANCELLED | OUTPATIENT
Start: 2022-09-06

## 2022-09-06 RX ORDER — FENTANYL CITRATE/PF 50 MCG/ML
50 SYRINGE (ML) INJECTION
Status: DISCONTINUED | OUTPATIENT
Start: 2022-09-06 | End: 2022-09-06 | Stop reason: HOSPADM

## 2022-09-06 RX ORDER — SODIUM CHLORIDE, SODIUM LACTATE, POTASSIUM CHLORIDE, CALCIUM CHLORIDE 600; 310; 30; 20 MG/100ML; MG/100ML; MG/100ML; MG/100ML
INJECTION, SOLUTION INTRAVENOUS CONTINUOUS PRN
Status: DISCONTINUED | OUTPATIENT
Start: 2022-09-06 | End: 2022-09-06

## 2022-09-06 RX ORDER — FENTANYL CITRATE/PF 50 MCG/ML
50 SYRINGE (ML) INJECTION
Status: CANCELLED | OUTPATIENT
Start: 2022-09-06

## 2022-09-06 RX ORDER — SODIUM CHLORIDE, SODIUM LACTATE, POTASSIUM CHLORIDE, CALCIUM CHLORIDE 600; 310; 30; 20 MG/100ML; MG/100ML; MG/100ML; MG/100ML
50 INJECTION, SOLUTION INTRAVENOUS CONTINUOUS
Status: CANCELLED | OUTPATIENT
Start: 2022-09-06

## 2022-09-06 RX ORDER — CEFAZOLIN SODIUM 1 G/50ML
1000 SOLUTION INTRAVENOUS ONCE
Status: COMPLETED | OUTPATIENT
Start: 2022-09-06 | End: 2022-09-06

## 2022-09-06 RX ORDER — FENTANYL CITRATE 50 UG/ML
INJECTION, SOLUTION INTRAMUSCULAR; INTRAVENOUS AS NEEDED
Status: DISCONTINUED | OUTPATIENT
Start: 2022-09-06 | End: 2022-09-06

## 2022-09-06 RX ORDER — ALBUTEROL SULFATE 2.5 MG/3ML
2.5 SOLUTION RESPIRATORY (INHALATION) ONCE AS NEEDED
Status: CANCELLED | OUTPATIENT
Start: 2022-09-06

## 2022-09-06 RX ORDER — MIDAZOLAM HYDROCHLORIDE 2 MG/2ML
INJECTION, SOLUTION INTRAMUSCULAR; INTRAVENOUS AS NEEDED
Status: DISCONTINUED | OUTPATIENT
Start: 2022-09-06 | End: 2022-09-06

## 2022-09-06 RX ORDER — GLYCOPYRROLATE 0.2 MG/ML
INJECTION INTRAMUSCULAR; INTRAVENOUS AS NEEDED
Status: DISCONTINUED | OUTPATIENT
Start: 2022-09-06 | End: 2022-09-06

## 2022-09-06 RX ORDER — ONDANSETRON HYDROCHLORIDE 8 MG/1
8 TABLET, FILM COATED ORAL EVERY 8 HOURS PRN
Qty: 30 TABLET | Refills: 1 | Status: SHIPPED | OUTPATIENT
Start: 2022-09-06

## 2022-09-06 RX ORDER — ONDANSETRON 2 MG/ML
INJECTION INTRAMUSCULAR; INTRAVENOUS AS NEEDED
Status: DISCONTINUED | OUTPATIENT
Start: 2022-09-06 | End: 2022-09-06

## 2022-09-06 RX ORDER — ONDANSETRON 2 MG/ML
4 INJECTION INTRAMUSCULAR; INTRAVENOUS ONCE AS NEEDED
Status: DISCONTINUED | OUTPATIENT
Start: 2022-09-06 | End: 2022-09-06 | Stop reason: HOSPADM

## 2022-09-06 RX ORDER — DEXAMETHASONE SODIUM PHOSPHATE 10 MG/ML
INJECTION, SOLUTION INTRAMUSCULAR; INTRAVENOUS AS NEEDED
Status: DISCONTINUED | OUTPATIENT
Start: 2022-09-06 | End: 2022-09-06

## 2022-09-06 RX ORDER — LIDOCAINE HYDROCHLORIDE AND EPINEPHRINE 10; 10 MG/ML; UG/ML
INJECTION, SOLUTION INFILTRATION; PERINEURAL AS NEEDED
Status: DISCONTINUED | OUTPATIENT
Start: 2022-09-06 | End: 2022-09-06 | Stop reason: HOSPADM

## 2022-09-06 RX ORDER — PROPOFOL 10 MG/ML
INJECTION, EMULSION INTRAVENOUS CONTINUOUS PRN
Status: DISCONTINUED | OUTPATIENT
Start: 2022-09-06 | End: 2022-09-06

## 2022-09-06 RX ORDER — ALBUTEROL SULFATE 2.5 MG/3ML
2.5 SOLUTION RESPIRATORY (INHALATION) ONCE AS NEEDED
Status: DISCONTINUED | OUTPATIENT
Start: 2022-09-06 | End: 2022-09-06 | Stop reason: HOSPADM

## 2022-09-06 RX ORDER — LIDOCAINE HYDROCHLORIDE 10 MG/ML
INJECTION, SOLUTION EPIDURAL; INFILTRATION; INTRACAUDAL; PERINEURAL AS NEEDED
Status: DISCONTINUED | OUTPATIENT
Start: 2022-09-06 | End: 2022-09-06

## 2022-09-06 RX ORDER — PROPOFOL 10 MG/ML
INJECTION, EMULSION INTRAVENOUS AS NEEDED
Status: DISCONTINUED | OUTPATIENT
Start: 2022-09-06 | End: 2022-09-06

## 2022-09-06 RX ADMIN — ONDANSETRON 4 MG: 2 INJECTION INTRAMUSCULAR; INTRAVENOUS at 09:35

## 2022-09-06 RX ADMIN — CEFAZOLIN SODIUM 1000 MG: 1 SOLUTION INTRAVENOUS at 09:09

## 2022-09-06 RX ADMIN — FENTANYL CITRATE 25 MCG: 50 INJECTION INTRAMUSCULAR; INTRAVENOUS at 09:40

## 2022-09-06 RX ADMIN — LIDOCAINE HYDROCHLORIDE 50 MG: 10 INJECTION, SOLUTION EPIDURAL; INFILTRATION; INTRACAUDAL; PERINEURAL at 09:33

## 2022-09-06 RX ADMIN — SODIUM CHLORIDE, SODIUM LACTATE, POTASSIUM CHLORIDE, AND CALCIUM CHLORIDE: .6; .31; .03; .02 INJECTION, SOLUTION INTRAVENOUS at 08:14

## 2022-09-06 RX ADMIN — MIDAZOLAM 2 MG: 1 INJECTION INTRAMUSCULAR; INTRAVENOUS at 09:29

## 2022-09-06 RX ADMIN — PROPOFOL 70 MCG/KG/MIN: 10 INJECTION, EMULSION INTRAVENOUS at 09:34

## 2022-09-06 RX ADMIN — DEXAMETHASONE SODIUM PHOSPHATE 10 MG: 10 INJECTION, SOLUTION INTRAMUSCULAR; INTRAVENOUS at 09:35

## 2022-09-06 RX ADMIN — PROPOFOL 50 MG: 10 INJECTION, EMULSION INTRAVENOUS at 09:34

## 2022-09-06 RX ADMIN — GLYCOPYRROLATE 0.2 MG: 0.2 INJECTION, SOLUTION INTRAMUSCULAR; INTRAVENOUS at 09:34

## 2022-09-06 RX ADMIN — CEFAZOLIN SODIUM 1000 MG: 1 SOLUTION INTRAVENOUS at 09:29

## 2022-09-06 RX ADMIN — FENTANYL CITRATE 25 MCG: 50 INJECTION INTRAMUSCULAR; INTRAVENOUS at 09:33

## 2022-09-06 NOTE — ANESTHESIA POSTPROCEDURE EVALUATION
Post-Op Assessment Note    CV Status:  Stable  Pain Score: 0    Pain management: adequate     Mental Status:  Alert and awake   Hydration Status:  Euvolemic   PONV Controlled:  Controlled   Airway Patency:  Patent      Post Op Vitals Reviewed: Yes      Staff: Anesthesiologist, CRNA         No complications documented      BP   113/67   Temp   97 2   Pulse  80   Resp   16   SpO2   96%

## 2022-09-06 NOTE — INTERVAL H&P NOTE
Patient arrived to [facility] for port placement    The procedure and risks were discussed with the patient  All questions were answered  Informed consent was obtained  H & P reviewed after examining the patient and I find no changes in the patient condition since the H & P has been written  /58   Pulse 75   Temp 98 °F (36 7 °C) (Temporal)   Resp 18   Ht 5' 1" (1 549 m)   Wt 58 1 kg (128 lb)   SpO2 98%   BMI 24 19 kg/m²     Patient re-evaluated   Accept as history and physical     Ren Baltazar, DO/September 6, 2022/8:08 AM

## 2022-09-06 NOTE — DISCHARGE INSTRUCTIONS
Implanted Venous Access Port     WHAT YOU NEED TO KNOW:   An implanted venous access port is a device used to give treatments and take blood  It may also be called a central venous access device (CVAD)  The port is a small container that is placed under your skin, usually in your upper chest  The port is attached to a catheter that enters a large vein  DISCHARGE INSTRUCTIONS:   Resume your normal diet  Small sips of flat soda will help with mild nausea  Prevent an infection:   Wash your hands often  Use soap and water  Clean your hands before and after you care for your port  Remind everyone who cares for your port to wash their hands  Check your skin for infection every day  Look for redness, swelling, or fluid oozing from the port site  Care for your port:   1  You may shower beginning 48 hours after procedure  2   Leave glue in place  3  It is normal for some bruising to occur  4  Use Tylenol for pain  5  Limit use of arm on the side that your port was placed  Lift nothing heavier than 5 pounds for 1 week, and then gradually increase activity as tolerated  6  DO NOT apply ointment, lotion or cream to port site until incision is healed  Allow glue to fall off  DO NOT attempt to peel glue from skin even it it begins to flake  7  After the port incision is healed you may swim, bathe  Notify the Interventional Radiologist if you have any of the followin  Fever above 101 F    2  Increased redness or swelling after 1st day  3  Increased pain after 1st day  4  Any sign of infection (drainage from port site, skin separation, hot to touch)  5  Persistent nausea or vomiting  Contact Interventional Radiology at [phone] [facility] PATIENTS: Contact Interventional Radiology at [phone]) ([address]: Contact Interventional Radiology at [phone])

## 2022-09-06 NOTE — OP NOTE
Chest port placement 9/6/22     History:      Uterine carcinosarcoma     Contrast: none     Procedure: The patient was identified verbally and by wristband  Timeout was performed  Informed consent was obtained  All elements of maximal sterile barrier technique were followed (cap, mask, sterile gown, sterile gloves, large sterile sheet, hand hygiene and 2% chlorhexidine for cutaneous antisepsis)  Following obtaining informed consent, the patient was prepped and draped in the usual sterile fashion  Using ultrasound guidance, access was gained to the patient's right nternal jugular vein using a micropuncture system  The micropuncture wire was removed and a  035 wire was advanced to the level of the inferior vena cava using fluoroscopic guidance  Using 1% lidocaine, the region of the right anterior chest was was anesthetized  A small incision was made using a 15 blade scalpel  The port pocket was created using blunt dissection  The catheter tubing was tunneled from the incision to the venotomy  The micropuncture dilator was exchanged for a peel-away sheath, using fluoroscopic guidance  The catheter was place through the peel-away sheath  The catheter was measured and cut to size  The catheter was attached to the port  The port was flushed with saline to ensure patency without evidence of leakage  The port was placed in the port pocket  The port was sutured in the pocket using 3-0 Vicryl  The incisions were approximated with 3-0 Vicryl and tissue adhesive  The patient tolerated the procedure well without apparent immediate complications  The patient left the IR department in unchanged condition  Dr Tracy Kern performed and directly supervised the entire procedure  Findings:      Using ultrasound guidance, the right internal jugular vein was cannulated using Seldinger technique  The right internal jugular vein was evaluated as a potential access site   The right internal jugular vein was patent, and free of thrombus  Static images of the vessel was obtained  Visualization of real time needle entry into the vessel was obtained  Fluoroscopic spot image demonstrates a newly placed single lumen chest port via the right internal jugular vein with the most central aspect at the SVC/RA junction  The catheter tubing is smooth in contour  IMPRESSION:     Successful placement of a single lumen chest port via the right internal jugular vein

## 2022-09-06 NOTE — ANESTHESIA PREPROCEDURE EVALUATION
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, here for Port-a-cath insertion for chemotherapy  Physical Exam    Airway    Mallampati score: II  TM Distance: >3 FB  Neck ROM: full     Dental   No notable dental hx     Cardiovascular      Pulmonary      Other Findings        Anesthesia Plan  ASA Score- 3     Anesthesia Type- IV sedation with anesthesia with ASA Monitors  Additional Monitors:   Airway Plan:           Plan Factors-Exercise tolerance (METS): >4 METS  Chart reviewed  Patient summary reviewed  Patient is not a current smoker  Obstructive sleep apnea risk education given perioperatively  Induction- intravenous  Postoperative Plan-     Informed Consent- Anesthetic plan and risks discussed with patient  I personally reviewed this patient with the CRNA  Discussed and agreed on the Anesthesia Plan with the CRNA  Lida Brand

## 2022-09-16 ENCOUNTER — TELEPHONE (OUTPATIENT)
Dept: GYNECOLOGIC ONCOLOGY | Facility: CLINIC | Age: 67
End: 2022-09-16

## 2022-09-16 ENCOUNTER — APPOINTMENT (OUTPATIENT)
Dept: LAB | Facility: HOSPITAL | Age: 67
End: 2022-09-16
Payer: MEDICARE

## 2022-09-16 DIAGNOSIS — C55 UTERINE CARCINOSARCOMA (HCC): Primary | ICD-10-CM

## 2022-09-16 LAB
ABO GROUP BLD: NORMAL
ALBUMIN SERPL BCP-MCNC: 3.7 G/DL (ref 3.5–5)
ALP SERPL-CCNC: 69 U/L (ref 46–116)
ALT SERPL W P-5'-P-CCNC: 22 U/L (ref 12–78)
ANION GAP SERPL CALCULATED.3IONS-SCNC: 8 MMOL/L (ref 4–13)
AST SERPL W P-5'-P-CCNC: 19 U/L (ref 5–45)
BASOPHILS # BLD AUTO: 0.03 THOUSANDS/ΜL (ref 0–0.1)
BASOPHILS NFR BLD AUTO: 0 % (ref 0–1)
BILIRUB SERPL-MCNC: 0.26 MG/DL (ref 0.2–1)
BLD GP AB SCN SERPL QL: NEGATIVE
BUN SERPL-MCNC: 17 MG/DL (ref 5–25)
CALCIUM SERPL-MCNC: 8.9 MG/DL (ref 8.3–10.1)
CHLORIDE SERPL-SCNC: 105 MMOL/L (ref 96–108)
CO2 SERPL-SCNC: 28 MMOL/L (ref 21–32)
CREAT SERPL-MCNC: 0.68 MG/DL (ref 0.6–1.3)
EOSINOPHIL # BLD AUTO: 0.2 THOUSAND/ΜL (ref 0–0.61)
EOSINOPHIL NFR BLD AUTO: 3 % (ref 0–6)
ERYTHROCYTE [DISTWIDTH] IN BLOOD BY AUTOMATED COUNT: 13.1 % (ref 11.6–15.1)
GFR SERPL CREATININE-BSD FRML MDRD: 89 ML/MIN/1.73SQ M
GLUCOSE SERPL-MCNC: 96 MG/DL (ref 65–140)
HCT VFR BLD AUTO: 44 % (ref 34.8–46.1)
HGB BLD-MCNC: 13.5 G/DL (ref 11.5–15.4)
IMM GRANULOCYTES # BLD AUTO: 0.03 THOUSAND/UL (ref 0–0.2)
IMM GRANULOCYTES NFR BLD AUTO: 0 % (ref 0–2)
LYMPHOCYTES # BLD AUTO: 2.46 THOUSANDS/ΜL (ref 0.6–4.47)
LYMPHOCYTES NFR BLD AUTO: 34 % (ref 14–44)
MAGNESIUM SERPL-MCNC: 2 MG/DL (ref 1.6–2.6)
MCH RBC QN AUTO: 27.3 PG (ref 26.8–34.3)
MCHC RBC AUTO-ENTMCNC: 30.7 G/DL (ref 31.4–37.4)
MCV RBC AUTO: 89 FL (ref 82–98)
MONOCYTES # BLD AUTO: 0.6 THOUSAND/ΜL (ref 0.17–1.22)
MONOCYTES NFR BLD AUTO: 8 % (ref 4–12)
NEUTROPHILS # BLD AUTO: 3.87 THOUSANDS/ΜL (ref 1.85–7.62)
NEUTS SEG NFR BLD AUTO: 55 % (ref 43–75)
NRBC BLD AUTO-RTO: 0 /100 WBCS
PLATELET # BLD AUTO: 322 THOUSANDS/UL (ref 149–390)
PMV BLD AUTO: 9.7 FL (ref 8.9–12.7)
POTASSIUM SERPL-SCNC: 4.1 MMOL/L (ref 3.5–5.3)
PROT SERPL-MCNC: 7.2 G/DL (ref 6.4–8.4)
RBC # BLD AUTO: 4.94 MILLION/UL (ref 3.81–5.12)
RH BLD: POSITIVE
SODIUM SERPL-SCNC: 141 MMOL/L (ref 135–147)
SPECIMEN EXPIRATION DATE: NORMAL
WBC # BLD AUTO: 7.19 THOUSAND/UL (ref 4.31–10.16)

## 2022-09-16 PROCEDURE — 83735 ASSAY OF MAGNESIUM: CPT | Performed by: NURSE PRACTITIONER

## 2022-09-16 PROCEDURE — 86900 BLOOD TYPING SEROLOGIC ABO: CPT

## 2022-09-16 PROCEDURE — 86304 IMMUNOASSAY TUMOR CA 125: CPT | Performed by: NURSE PRACTITIONER

## 2022-09-16 PROCEDURE — 86850 RBC ANTIBODY SCREEN: CPT

## 2022-09-16 PROCEDURE — 80053 COMPREHEN METABOLIC PANEL: CPT | Performed by: NURSE PRACTITIONER

## 2022-09-16 PROCEDURE — 85025 COMPLETE CBC W/AUTO DIFF WBC: CPT | Performed by: NURSE PRACTITIONER

## 2022-09-16 PROCEDURE — 86901 BLOOD TYPING SEROLOGIC RH(D): CPT

## 2022-09-16 PROCEDURE — 36415 COLL VENOUS BLD VENIPUNCTURE: CPT

## 2022-09-16 NOTE — TELEPHONE ENCOUNTER
Patient called in, I told her that she needed to go for her labs today per Sulema's last note  Patient was in agreement and states that she will go to the [place] [id number] to have labs done

## 2022-09-17 LAB — CANCER AG125 SERPL-ACNC: 84.9 U/ML (ref 0–30)

## 2022-09-19 ENCOUNTER — PATIENT OUTREACH (OUTPATIENT)
Dept: CASE MANAGEMENT | Facility: HOSPITAL | Age: 67
End: 2022-09-19

## 2022-09-19 ENCOUNTER — HOSPITAL ENCOUNTER (OUTPATIENT)
Dept: INFUSION CENTER | Facility: CLINIC | Age: 67
Discharge: HOME | End: 2022-09-19
Payer: MEDICARE

## 2022-09-19 VITALS
WEIGHT: 129.8 LBS | HEART RATE: 94 BPM | DIASTOLIC BLOOD PRESSURE: 81 MMHG | HEIGHT: 61 IN | SYSTOLIC BLOOD PRESSURE: 126 MMHG | TEMPERATURE: 98.4 F | BODY MASS INDEX: 24.51 KG/M2 | RESPIRATION RATE: 16 BRPM

## 2022-09-19 DIAGNOSIS — C55 UTERINE CARCINOSARCOMA (HCC): Primary | ICD-10-CM

## 2022-09-19 PROCEDURE — 96375 TX/PRO/DX INJ NEW DRUG ADDON: CPT

## 2022-09-19 PROCEDURE — 96417 CHEMO IV INFUS EACH ADDL SEQ: CPT

## 2022-09-19 PROCEDURE — 96367 TX/PROPH/DG ADDL SEQ IV INF: CPT

## 2022-09-19 PROCEDURE — 96415 CHEMO IV INFUSION ADDL HR: CPT

## 2022-09-19 PROCEDURE — 96413 CHEMO IV INFUSION 1 HR: CPT

## 2022-09-19 RX ORDER — SODIUM CHLORIDE 9 MG/ML
20 INJECTION, SOLUTION INTRAVENOUS ONCE
Status: COMPLETED | OUTPATIENT
Start: 2022-09-19 | End: 2022-09-19

## 2022-09-19 RX ORDER — PALONOSETRON 0.05 MG/ML
0.25 INJECTION, SOLUTION INTRAVENOUS ONCE
Status: CANCELLED | OUTPATIENT
Start: 2022-09-19

## 2022-09-19 RX ORDER — SODIUM CHLORIDE 9 MG/ML
20 INJECTION, SOLUTION INTRAVENOUS ONCE
Status: CANCELLED | OUTPATIENT
Start: 2022-09-19

## 2022-09-19 RX ORDER — PALONOSETRON 0.05 MG/ML
0.25 INJECTION, SOLUTION INTRAVENOUS ONCE
Status: COMPLETED | OUTPATIENT
Start: 2022-09-19 | End: 2022-09-19

## 2022-09-19 RX ADMIN — FOSAPREPITANT 150 MG: 150 INJECTION, POWDER, LYOPHILIZED, FOR SOLUTION INTRAVENOUS at 10:53

## 2022-09-19 RX ADMIN — SODIUM CHLORIDE 20 ML/HR: 0.9 INJECTION, SOLUTION INTRAVENOUS at 09:11

## 2022-09-19 RX ADMIN — FAMOTIDINE 20 MG: 10 INJECTION, SOLUTION INTRAVENOUS at 10:29

## 2022-09-19 RX ADMIN — PACLITAXEL 274.8 MG: 6 INJECTION, SOLUTION, CONCENTRATE INTRAVENOUS at 11:37

## 2022-09-19 RX ADMIN — DIPHENHYDRAMINE HYDROCHLORIDE 25 MG: 50 INJECTION, SOLUTION INTRAMUSCULAR; INTRAVENOUS at 10:06

## 2022-09-19 RX ADMIN — PALONOSETRON 0.25 MG: 0.05 INJECTION, SOLUTION INTRAVENOUS at 09:37

## 2022-09-19 RX ADMIN — DEXAMETHASONE SODIUM PHOSPHATE 20 MG: 10 INJECTION, SOLUTION INTRAMUSCULAR; INTRAVENOUS at 09:41

## 2022-09-19 RX ADMIN — CARBOPLATIN 572.4 MG: 10 INJECTION, SOLUTION INTRAVENOUS at 14:39

## 2022-09-19 NOTE — PROGRESS NOTES
Pt to clinic for initial taxol and carboplatin treatment, offers no complaints today, tolerated infusion without complications, aware of next appointment, port de-accessed, avs printed and reviewed

## 2022-09-19 NOTE — PROGRESS NOTES
Biopsychosocial and Barriers Assessment    Cancer Diagnosis: endometrial  Home/Cell Phone: [phone]  Emergency Contact: son Serene Spatz  Marital Status:   Interpretation concerns, speaks another language, preferred language: speaks [place]  Cultural concerns: none noted  Ability to read or write: independent    Caregiver/Support: son and DIL live with her  Children: two adult sons and their wives  Child/Elder care: NA    Housing: lives local to [facility], owns her home  Home Setup:   Lives With: son Serene Spatz and his wife  Daily Living Activities: independent  Durable Medical Equipment: none  Ambulation: independent    Preferred Pharmacy:   High co-pays with insurance: NA  High co-pays with medication coverage: NA  No medication coverage:  NA    Primary Care Provider: Dr Leda Rosario  Hx of [address]: none  Hx of Short term rehab: none  Mental Health Hx: none  Substance Abuse Hx: none  Employment: retired from a medical office, works at [employer] Status/Location:  Ability to pay bills: no concerns  POA/LW/AD:   Transportation Plan/Concerns: family will assist as needed, drives herself  What do you know about your Cancer Diagnosis    What has your doctor told you about your cancer diagnosis:    What has your doctor told you about your cancer treatment:    What specific concerns do you have about your diagnosis and treatment:    Have you been made aware of any hair loss associated with treatment:    Additional Comments:      MSJACOB met with pt in the infusion center, she is here today for her first infusion for endometrial cancer  We completed the above assessment and her DT  Pt is very settled and content with her dx and tells me that she is not upset or bothered by it  She is very complimentary of Dr Cristiana Finley and says that she is healing very well from her hysterectomy  Pt's adult son and his wife live with her, she says that this works very well for them and will be a permanent arrangement    She has another adult son who lives in [place], she has been to visit him there and talked about how much she loved where he lives  Her eventual plan is to travel between both homes and spend more of the winter time in [place]  She spoke at length about the love and support she has from her immediate and extended family, and how important this is to her in her life in general   She also spoke about her richar in God and how that has carried her through many difficult life situations in the past   She is retired from a [employer] practice near her home, where she worked for 30 years as a , and says that the same week she retired she went to a local [employer] and applied for a job there, which she loves and seems to take great tiff in  She denies any needs or concerns at this time but was very happy for the time spent talking today  She agrees to let me know if her needs are to change moving forward  MSW will remain available to her as needed in the future, no needs or concerns at this time

## 2022-09-22 ENCOUNTER — APPOINTMENT (OUTPATIENT)
Dept: LAB | Facility: HOSPITAL | Age: 67
End: 2022-09-22
Payer: MEDICARE

## 2022-09-22 DIAGNOSIS — C55 UTERINE CARCINOSARCOMA (HCC): ICD-10-CM

## 2022-09-22 DIAGNOSIS — C54.1 MALIGNANT NEOPLASM OF ENDOMETRIUM (HCC): ICD-10-CM

## 2022-09-22 LAB
ALBUMIN SERPL BCP-MCNC: 3.5 G/DL (ref 3.5–5)
ALP SERPL-CCNC: 59 U/L (ref 46–116)
ALT SERPL W P-5'-P-CCNC: 17 U/L (ref 12–78)
ANION GAP SERPL CALCULATED.3IONS-SCNC: 6 MMOL/L (ref 4–13)
AST SERPL W P-5'-P-CCNC: 15 U/L (ref 5–45)
BASOPHILS # BLD AUTO: 0.03 THOUSANDS/ΜL (ref 0–0.1)
BASOPHILS NFR BLD AUTO: 0 % (ref 0–1)
BILIRUB SERPL-MCNC: 0.92 MG/DL (ref 0.2–1)
BUN SERPL-MCNC: 11 MG/DL (ref 5–25)
CALCIUM SERPL-MCNC: 9.2 MG/DL (ref 8.3–10.1)
CANCER AG125 SERPL-ACNC: 85.4 U/ML (ref 0–30)
CHLORIDE SERPL-SCNC: 106 MMOL/L (ref 96–108)
CO2 SERPL-SCNC: 27 MMOL/L (ref 21–32)
CREAT SERPL-MCNC: 0.63 MG/DL (ref 0.6–1.3)
EOSINOPHIL # BLD AUTO: 0.08 THOUSAND/ΜL (ref 0–0.61)
EOSINOPHIL NFR BLD AUTO: 1 % (ref 0–6)
ERYTHROCYTE [DISTWIDTH] IN BLOOD BY AUTOMATED COUNT: 13.1 % (ref 11.6–15.1)
GFR SERPL CREATININE-BSD FRML MDRD: 91 ML/MIN/1.73SQ M
GLUCOSE P FAST SERPL-MCNC: 112 MG/DL (ref 65–99)
HCT VFR BLD AUTO: 39.9 % (ref 34.8–46.1)
HGB BLD-MCNC: 12.5 G/DL (ref 11.5–15.4)
IMM GRANULOCYTES # BLD AUTO: 0.02 THOUSAND/UL (ref 0–0.2)
IMM GRANULOCYTES NFR BLD AUTO: 0 % (ref 0–2)
LYMPHOCYTES # BLD AUTO: 1.67 THOUSANDS/ΜL (ref 0.6–4.47)
LYMPHOCYTES NFR BLD AUTO: 25 % (ref 14–44)
MAGNESIUM SERPL-MCNC: 2.3 MG/DL (ref 1.6–2.6)
MCH RBC QN AUTO: 27.4 PG (ref 26.8–34.3)
MCHC RBC AUTO-ENTMCNC: 31.3 G/DL (ref 31.4–37.4)
MCV RBC AUTO: 88 FL (ref 82–98)
MONOCYTES # BLD AUTO: 0.2 THOUSAND/ΜL (ref 0.17–1.22)
MONOCYTES NFR BLD AUTO: 3 % (ref 4–12)
NEUTROPHILS # BLD AUTO: 4.78 THOUSANDS/ΜL (ref 1.85–7.62)
NEUTS SEG NFR BLD AUTO: 71 % (ref 43–75)
NRBC BLD AUTO-RTO: 0 /100 WBCS
PLATELET # BLD AUTO: 257 THOUSANDS/UL (ref 149–390)
PMV BLD AUTO: 10.6 FL (ref 8.9–12.7)
POTASSIUM SERPL-SCNC: 4.5 MMOL/L (ref 3.5–5.3)
PROT SERPL-MCNC: 6.8 G/DL (ref 6.4–8.4)
RBC # BLD AUTO: 4.56 MILLION/UL (ref 3.81–5.12)
SODIUM SERPL-SCNC: 139 MMOL/L (ref 135–147)
WBC # BLD AUTO: 6.78 THOUSAND/UL (ref 4.31–10.16)

## 2022-09-22 PROCEDURE — 86304 IMMUNOASSAY TUMOR CA 125: CPT

## 2022-09-22 PROCEDURE — 36415 COLL VENOUS BLD VENIPUNCTURE: CPT

## 2022-09-22 PROCEDURE — 85025 COMPLETE CBC W/AUTO DIFF WBC: CPT

## 2022-09-22 PROCEDURE — 83735 ASSAY OF MAGNESIUM: CPT

## 2022-09-22 PROCEDURE — 80053 COMPREHEN METABOLIC PANEL: CPT

## 2022-09-29 ENCOUNTER — APPOINTMENT (OUTPATIENT)
Dept: LAB | Facility: HOSPITAL | Age: 67
End: 2022-09-29
Payer: MEDICARE

## 2022-09-29 DIAGNOSIS — C54.1 MALIGNANT NEOPLASM OF ENDOMETRIUM (HCC): ICD-10-CM

## 2022-09-29 DIAGNOSIS — C55 UTERINE CARCINOSARCOMA (HCC): ICD-10-CM

## 2022-09-29 LAB
ALBUMIN SERPL BCP-MCNC: 3.5 G/DL (ref 3.5–5)
ALP SERPL-CCNC: 63 U/L (ref 46–116)
ALT SERPL W P-5'-P-CCNC: 23 U/L (ref 12–78)
ANION GAP SERPL CALCULATED.3IONS-SCNC: 6 MMOL/L (ref 4–13)
AST SERPL W P-5'-P-CCNC: 18 U/L (ref 5–45)
BASOPHILS # BLD MANUAL: 0 THOUSAND/UL (ref 0–0.1)
BASOPHILS NFR MAR MANUAL: 0 % (ref 0–1)
BILIRUB SERPL-MCNC: 0.27 MG/DL (ref 0.2–1)
BUN SERPL-MCNC: 12 MG/DL (ref 5–25)
CALCIUM SERPL-MCNC: 8.7 MG/DL (ref 8.3–10.1)
CANCER AG125 SERPL-ACNC: 67.6 U/ML (ref 0–30)
CHLORIDE SERPL-SCNC: 108 MMOL/L (ref 96–108)
CO2 SERPL-SCNC: 30 MMOL/L (ref 21–32)
CREAT SERPL-MCNC: 0.59 MG/DL (ref 0.6–1.3)
EOSINOPHIL # BLD MANUAL: 0.03 THOUSAND/UL (ref 0–0.4)
EOSINOPHIL NFR BLD MANUAL: 1 % (ref 0–6)
ERYTHROCYTE [DISTWIDTH] IN BLOOD BY AUTOMATED COUNT: 12.6 % (ref 11.6–15.1)
GFR SERPL CREATININE-BSD FRML MDRD: 93 ML/MIN/1.73SQ M
GLUCOSE P FAST SERPL-MCNC: 97 MG/DL (ref 65–99)
HCT VFR BLD AUTO: 36.9 % (ref 34.8–46.1)
HGB BLD-MCNC: 11.2 G/DL (ref 11.5–15.4)
LYMPHOCYTES # BLD AUTO: 1.23 THOUSAND/UL (ref 0.6–4.47)
LYMPHOCYTES # BLD AUTO: 49 % (ref 14–44)
MAGNESIUM SERPL-MCNC: 2.1 MG/DL (ref 1.6–2.6)
MCH RBC QN AUTO: 26.9 PG (ref 26.8–34.3)
MCHC RBC AUTO-ENTMCNC: 30.4 G/DL (ref 31.4–37.4)
MCV RBC AUTO: 89 FL (ref 82–98)
MONOCYTES # BLD AUTO: 0.28 THOUSAND/UL (ref 0–1.22)
MONOCYTES NFR BLD: 11 % (ref 4–12)
MYELOCYTES NFR BLD MANUAL: 1 % (ref 0–1)
NEUTROPHILS # BLD MANUAL: 0.95 THOUSAND/UL (ref 1.85–7.62)
NEUTS BAND NFR BLD MANUAL: 2 % (ref 0–8)
NEUTS SEG NFR BLD AUTO: 36 % (ref 43–75)
PLATELET # BLD AUTO: 320 THOUSANDS/UL (ref 149–390)
PLATELET BLD QL SMEAR: ADEQUATE
PMV BLD AUTO: 9.4 FL (ref 8.9–12.7)
POTASSIUM SERPL-SCNC: 4.3 MMOL/L (ref 3.5–5.3)
PROT SERPL-MCNC: 6.6 G/DL (ref 6.4–8.4)
RBC # BLD AUTO: 4.16 MILLION/UL (ref 3.81–5.12)
SODIUM SERPL-SCNC: 144 MMOL/L (ref 135–147)
WBC # BLD AUTO: 2.51 THOUSAND/UL (ref 4.31–10.16)

## 2022-09-29 PROCEDURE — 36415 COLL VENOUS BLD VENIPUNCTURE: CPT

## 2022-09-29 PROCEDURE — 86304 IMMUNOASSAY TUMOR CA 125: CPT

## 2022-09-29 PROCEDURE — 83735 ASSAY OF MAGNESIUM: CPT

## 2022-09-29 PROCEDURE — 80053 COMPREHEN METABOLIC PANEL: CPT

## 2022-09-29 PROCEDURE — 85027 COMPLETE CBC AUTOMATED: CPT

## 2022-09-29 PROCEDURE — 85007 BL SMEAR W/DIFF WBC COUNT: CPT

## 2022-10-05 ENCOUNTER — OFFICE VISIT (OUTPATIENT)
Dept: GYNECOLOGIC ONCOLOGY | Facility: CLINIC | Age: 67
End: 2022-10-05

## 2022-10-05 VITALS
HEART RATE: 95 BPM | BODY MASS INDEX: 24.88 KG/M2 | SYSTOLIC BLOOD PRESSURE: 112 MMHG | HEIGHT: 61 IN | TEMPERATURE: 97.5 F | WEIGHT: 131.8 LBS | RESPIRATION RATE: 18 BRPM | DIASTOLIC BLOOD PRESSURE: 72 MMHG | OXYGEN SATURATION: 99 %

## 2022-10-05 DIAGNOSIS — C55 UTERINE CARCINOSARCOMA (HCC): Primary | ICD-10-CM

## 2022-10-05 PROCEDURE — 99024 POSTOP FOLLOW-UP VISIT: CPT | Performed by: OBSTETRICS & GYNECOLOGY

## 2022-10-05 NOTE — ASSESSMENT & PLAN NOTE
Patient is very pleasant 58-year-old female with history of stage I A carcinosarcoma of the endometrium  She underwent robotic hysterectomy BSO and staging and her 1st cycle of carboplatin paclitaxel therapy  She tolerated her treatment well  Her berny WBC was 2 5 and the patient is due for recheck later this week  She has had absolutely no symptoms regarding her treatment with exception of mild hair thinning  We will continue present chemo at present dosages unless the upcoming berny labs could are decreased

## 2022-10-05 NOTE — PROGRESS NOTES
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  She underwent robotic hysterectomy BSO and staging and her 1st cycle of carboplatin paclitaxel therapy  She tolerated her treatment well  Her berny WBC was 2 5 and the patient is due for recheck later this week  She has had absolutely no symptoms regarding her treatment with exception of mild hair thinning  We will continue present chemo at present dosages unless the upcoming berny labs could are decreased  CHIEF COMPLAINT:  Consideration of cycle 2 carboplatin paclitaxel for stage 1A carcinosarcoma of the endometrium      Problem:  Cancer Staging  Uterine carcinosarcoma [facility])  Staging form: Corpus Uteri - Carcinoma, AJCC 8th Edition  - Clinical stage from 8/26/2022: FIGO Stage IA (cT1a, cN0, cM0) - Signed by Artemio Marcano MD on 8/26/2022        Previous therapy:  Oncology History   Uterine carcinosarcoma ([facility] [address] )   8/18/2022 Initial Diagnosis    Uterine carcinosarcoma ([facility] [address] )     8/26/2022 -  Cancer Staged    Staging form: Corpus Uteri - Carcinoma, AJCC 8th Edition  - Clinical stage from 8/26/2022: FIGO Stage IA (cT1a, cN0, cM0) - Signed by Artemio Marcano MD on 8/26/2022  Histopathologic type: Carcinosarcoma  Stage prefix: Initial diagnosis  Histologic grade (G): G3  Histologic grading system: 3 grade system       8/2022 Surgery    Patient and did went robotic hysterectomy bilateral salpingo-oophorectomy sentinel lymph node mapping and biopsy for stage I A non invasive carcinosarcoma of the endometrium  The uterus did fracture within the vagina but no leakage into the abdomen was noted       9/19/2022 -  Chemotherapy    palonosetron (ALOXI), 0 25 mg, Intravenous, Once, 1 of 6 cycles  Administration: 0 25 mg (9/19/2022)  fosaprepitant (EMEND) IVPB, 150 mg, Intravenous, Once, 1 of 6 cycles  Administration: 150 mg (9/19/2022)  CARBOplatin (PARAPLATIN) IVPB (GOG AUC DOSING), 572 4 mg, Intravenous, Once, 1 of 6 cycles  Administration: 572 4 mg (9/19/2022)  PACLItaxel (TAXOL) chemo IVPB, 175 mg/m2 = 274 8 mg, Intravenous, Once, 1 of 6 cycles  Administration: 274 8 mg (9/19/2022)           Patient ID: Shankar Alcala is a 69 y o  female  Patient is very pleasant 78-year-old female history of stage IA carcinosarcoma of the endometrium  She underwent robotic assisted total laparoscopic hysterectomy bilateral salpingo-oophorectomy and initial treatment with carboplatin paclitaxel for her 1st cycle  Overall she tolerated the cycle well  I reviewed the patient's weekly CBC CPM P data  They reveal that this patient has white blood cell count is berny Ng now at 2 5  Aside from that the remainder of the numbers are normal   The patient's  initial peak was 85 and has dropped down to 69 at this point  Today, the patient is doing well  She denies significant abdominal pain, pelvic pain, nausea, vomiting, constipation, diarrhea, fevers, chills, or vaginal bleeding  She had essentially no symptoms related to her chemotherapy  She does note some mild hair thinning at this point      The following portions of the patient's history were reviewed and updated as appropriate: allergies, current medications, past family history, past social history, past surgical history and problem list     Review of Systems   Constitutional: Negative  HENT: Negative  Eyes: Negative  Respiratory: Negative  Cardiovascular: Negative  Gastrointestinal: Negative  Endocrine: Negative  Genitourinary: Negative  Musculoskeletal: Negative  Skin: Negative  Neurological: Negative  Hematological: Negative  Psychiatric/Behavioral: Negative          Current Outpatient Medications   Medication Sig Dispense Refill    cholecalciferol (VITAMIN D3) 1,000 units tablet Take 1,000 Units by mouth daily      cyanocobalamin (VITAMIN B-12) 100 MCG tablet Take 100 mcg by mouth daily      escitalopram (LEXAPRO) 10 mg tablet Take 1 tablet (10 mg total) by mouth daily 90 tablet 3    LORazepam (ATIVAN) 1 mg tablet Take 1 tablet (1 mg total) by mouth every 6 (six) hours as needed for anxiety (or nausea) 36 tablet 1    Multiple Vitamins-Minerals (multivitamin with minerals) tablet Take 1 tablet by mouth daily      Multiple Vitamins-Minerals (OCUVITE PO) Take by mouth      Multiple Vitamins-Minerals (OCUVITE PO) Take by mouth in the morning      naloxone (NARCAN) 4 mg/0 1 mL nasal spray Administer 1 spray into a nostril  If no response after 2-3 minutes, give another dose in the other nostril using a new spray  1 each 1    ondansetron (ZOFRAN) 8 mg tablet TAKE 1 TABLET (8 MG TOTAL) BY MOUTH EVERY 8 (EIGHT) HOURS AS NEEDED FOR NAUSEA OR VOMITING 30 tablet 1    rosuvastatin (CRESTOR) 5 mg tablet Take 1 tablet (5 mg total) by mouth daily 90 tablet 3     No current facility-administered medications for this visit  Objective:    Blood pressure 112/72, pulse 95, temperature 97 5 °F (36 4 °C), temperature source Tympanic, resp  rate 18, height 5' 1 3" (1 557 m), weight 59 8 kg (131 lb 12 8 oz), SpO2 99 %, not currently breastfeeding  Body mass index is 24 66 kg/m²  Body surface area is 1 59 meters squared  Physical Exam  Constitutional:       Appearance: She is well-developed  HENT:      Head: Normocephalic and atraumatic  Neck:      Thyroid: No thyromegaly  Cardiovascular:      Rate and Rhythm: Normal rate and regular rhythm  Heart sounds: Normal heart sounds  Pulmonary:      Effort: Pulmonary effort is normal       Breath sounds: Normal breath sounds  Abdominal:      General: Bowel sounds are normal       Palpations: Abdomen is soft  Comments: Well healed laparoscopic incisions     Genitourinary:     Comments: Deferred  Musculoskeletal:         General: Normal range of motion  Cervical back: Normal range of motion and neck supple  Lymphadenopathy:      Cervical: No cervical adenopathy  Skin:     General: Skin is warm and dry  Neurological:      Mental Status: She is alert and oriented to person, place, and time     Psychiatric:         Behavior: Behavior normal          Lab Results   Component Value Date     67 6 (H) 09/29/2022     Lab Results   Component Value Date    K 4 3 09/29/2022     09/29/2022    CO2 30 09/29/2022    BUN 12 09/29/2022    CREATININE 0 59 (L) 09/29/2022    GLUF 97 09/29/2022    CALCIUM 8 7 09/29/2022    AST 18 09/29/2022    ALT 23 09/29/2022    ALKPHOS 63 09/29/2022    EGFR 93 09/29/2022     Lab Results   Component Value Date    WBC 2 51 (L) 09/29/2022    HGB 11 2 (L) 09/29/2022    HCT 36 9 09/29/2022    MCV 89 09/29/2022     09/29/2022     Lab Results   Component Value Date    NEUTROABS 4 78 09/22/2022        Trend:  Lab Results   Component Value Date     67 6 (H) 09/29/2022     85 4 (H) 09/22/2022     84 9 (H) 09/16/2022

## 2022-10-06 ENCOUNTER — APPOINTMENT (OUTPATIENT)
Dept: LAB | Facility: HOSPITAL | Age: 67
End: 2022-10-06
Payer: MEDICARE

## 2022-10-06 DIAGNOSIS — C54.1 MALIGNANT NEOPLASM OF ENDOMETRIUM (HCC): ICD-10-CM

## 2022-10-06 DIAGNOSIS — C55 UTERINE CARCINOSARCOMA (HCC): ICD-10-CM

## 2022-10-06 LAB
ALBUMIN SERPL BCP-MCNC: 3.8 G/DL (ref 3.5–5)
ALP SERPL-CCNC: 72 U/L (ref 46–116)
ALT SERPL W P-5'-P-CCNC: 24 U/L (ref 12–78)
ANION GAP SERPL CALCULATED.3IONS-SCNC: 6 MMOL/L (ref 4–13)
AST SERPL W P-5'-P-CCNC: 20 U/L (ref 5–45)
BASOPHILS # BLD AUTO: 0.03 THOUSANDS/ΜL (ref 0–0.1)
BASOPHILS NFR BLD AUTO: 1 % (ref 0–1)
BILIRUB SERPL-MCNC: 0.29 MG/DL (ref 0.2–1)
BUN SERPL-MCNC: 9 MG/DL (ref 5–25)
CALCIUM SERPL-MCNC: 9.1 MG/DL (ref 8.3–10.1)
CHLORIDE SERPL-SCNC: 104 MMOL/L (ref 96–108)
CO2 SERPL-SCNC: 29 MMOL/L (ref 21–32)
CREAT SERPL-MCNC: 0.65 MG/DL (ref 0.6–1.3)
EOSINOPHIL # BLD AUTO: 0.06 THOUSAND/ΜL (ref 0–0.61)
EOSINOPHIL NFR BLD AUTO: 2 % (ref 0–6)
ERYTHROCYTE [DISTWIDTH] IN BLOOD BY AUTOMATED COUNT: 13.3 % (ref 11.6–15.1)
GFR SERPL CREATININE-BSD FRML MDRD: 90 ML/MIN/1.73SQ M
GLUCOSE P FAST SERPL-MCNC: 92 MG/DL (ref 65–99)
HCT VFR BLD AUTO: 40.8 % (ref 34.8–46.1)
HGB BLD-MCNC: 12.6 G/DL (ref 11.5–15.4)
IMM GRANULOCYTES # BLD AUTO: 0.01 THOUSAND/UL (ref 0–0.2)
IMM GRANULOCYTES NFR BLD AUTO: 0 % (ref 0–2)
LYMPHOCYTES # BLD AUTO: 1.78 THOUSANDS/ΜL (ref 0.6–4.47)
LYMPHOCYTES NFR BLD AUTO: 52 % (ref 14–44)
MAGNESIUM SERPL-MCNC: 2 MG/DL (ref 1.6–2.6)
MCH RBC QN AUTO: 27.1 PG (ref 26.8–34.3)
MCHC RBC AUTO-ENTMCNC: 30.9 G/DL (ref 31.4–37.4)
MCV RBC AUTO: 88 FL (ref 82–98)
MONOCYTES # BLD AUTO: 0.62 THOUSAND/ΜL (ref 0.17–1.22)
MONOCYTES NFR BLD AUTO: 18 % (ref 4–12)
NEUTROPHILS # BLD AUTO: 0.92 THOUSANDS/ΜL (ref 1.85–7.62)
NEUTS SEG NFR BLD AUTO: 27 % (ref 43–75)
NRBC BLD AUTO-RTO: 0 /100 WBCS
PLATELET # BLD AUTO: 282 THOUSANDS/UL (ref 149–390)
PMV BLD AUTO: 9 FL (ref 8.9–12.7)
POTASSIUM SERPL-SCNC: 4.2 MMOL/L (ref 3.5–5.3)
PROT SERPL-MCNC: 7.1 G/DL (ref 6.4–8.4)
RBC # BLD AUTO: 4.65 MILLION/UL (ref 3.81–5.12)
SODIUM SERPL-SCNC: 139 MMOL/L (ref 135–147)
WBC # BLD AUTO: 3.42 THOUSAND/UL (ref 4.31–10.16)

## 2022-10-06 PROCEDURE — 85025 COMPLETE CBC W/AUTO DIFF WBC: CPT

## 2022-10-06 PROCEDURE — 36415 COLL VENOUS BLD VENIPUNCTURE: CPT

## 2022-10-06 PROCEDURE — 80053 COMPREHEN METABOLIC PANEL: CPT

## 2022-10-06 PROCEDURE — 83735 ASSAY OF MAGNESIUM: CPT

## 2022-10-07 ENCOUNTER — TELEPHONE (OUTPATIENT)
Dept: GYNECOLOGIC ONCOLOGY | Facility: CLINIC | Age: 67
End: 2022-10-07

## 2022-10-07 DIAGNOSIS — C55 UTERINE CARCINOSARCOMA (HCC): Primary | ICD-10-CM

## 2022-10-07 RX ORDER — PALONOSETRON 0.05 MG/ML
0.25 INJECTION, SOLUTION INTRAVENOUS ONCE
Status: CANCELLED | OUTPATIENT
Start: 2022-10-10

## 2022-10-07 RX ORDER — SODIUM CHLORIDE 9 MG/ML
20 INJECTION, SOLUTION INTRAVENOUS ONCE
Status: CANCELLED | OUTPATIENT
Start: 2022-10-10

## 2022-10-10 ENCOUNTER — HOSPITAL ENCOUNTER (OUTPATIENT)
Dept: INFUSION CENTER | Facility: CLINIC | Age: 67
Discharge: HOME | End: 2022-10-10
Payer: MEDICARE

## 2022-10-10 VITALS
HEIGHT: 61 IN | DIASTOLIC BLOOD PRESSURE: 76 MMHG | BODY MASS INDEX: 24.55 KG/M2 | RESPIRATION RATE: 16 BRPM | WEIGHT: 130 LBS | HEART RATE: 98 BPM | TEMPERATURE: 97.8 F | SYSTOLIC BLOOD PRESSURE: 158 MMHG

## 2022-10-10 DIAGNOSIS — C55 UTERINE CARCINOSARCOMA (HCC): Primary | ICD-10-CM

## 2022-10-10 PROCEDURE — 96367 TX/PROPH/DG ADDL SEQ IV INF: CPT

## 2022-10-10 PROCEDURE — 96415 CHEMO IV INFUSION ADDL HR: CPT

## 2022-10-10 PROCEDURE — 96375 TX/PRO/DX INJ NEW DRUG ADDON: CPT

## 2022-10-10 PROCEDURE — 96413 CHEMO IV INFUSION 1 HR: CPT

## 2022-10-10 PROCEDURE — 96417 CHEMO IV INFUS EACH ADDL SEQ: CPT

## 2022-10-10 RX ORDER — PALONOSETRON 0.05 MG/ML
0.25 INJECTION, SOLUTION INTRAVENOUS ONCE
Status: COMPLETED | OUTPATIENT
Start: 2022-10-10 | End: 2022-10-10

## 2022-10-10 RX ORDER — SODIUM CHLORIDE 9 MG/ML
20 INJECTION, SOLUTION INTRAVENOUS ONCE
Status: COMPLETED | OUTPATIENT
Start: 2022-10-10 | End: 2022-10-10

## 2022-10-10 RX ADMIN — PACLITAXEL 278.4 MG: 6 INJECTION, SOLUTION, CONCENTRATE INTRAVENOUS at 11:22

## 2022-10-10 RX ADMIN — CARBOPLATIN 579.6 MG: 10 INJECTION, SOLUTION INTRAVENOUS at 14:27

## 2022-10-10 RX ADMIN — PALONOSETRON 0.25 MG: 0.05 INJECTION, SOLUTION INTRAVENOUS at 11:14

## 2022-10-10 RX ADMIN — FAMOTIDINE 20 MG: 10 INJECTION, SOLUTION INTRAVENOUS at 10:00

## 2022-10-10 RX ADMIN — FOSAPREPITANT 150 MG: 150 INJECTION, POWDER, LYOPHILIZED, FOR SOLUTION INTRAVENOUS at 10:31

## 2022-10-10 RX ADMIN — DIPHENHYDRAMINE HYDROCHLORIDE 25 MG: 50 INJECTION, SOLUTION INTRAMUSCULAR; INTRAVENOUS at 09:33

## 2022-10-10 RX ADMIN — DEXAMETHASONE SODIUM PHOSPHATE 20 MG: 10 INJECTION, SOLUTION INTRAMUSCULAR; INTRAVENOUS at 09:09

## 2022-10-10 RX ADMIN — SODIUM CHLORIDE 20 ML/HR: 0.9 INJECTION, SOLUTION INTRAVENOUS at 09:09

## 2022-10-10 NOTE — PROGRESS NOTES
Pt to clinic for taxol and carboplatin treatment, offers no complaints today, tolerated infusion without complications, aware of next appointment, port de-accessed, avs declined and reviewed

## 2022-10-11 ENCOUNTER — HOSPITAL ENCOUNTER (OUTPATIENT)
Dept: INFUSION CENTER | Facility: CLINIC | Age: 67
Discharge: HOME | End: 2022-10-11
Payer: MEDICARE

## 2022-10-11 DIAGNOSIS — C55 UTERINE CARCINOSARCOMA (HCC): Primary | ICD-10-CM

## 2022-10-11 PROCEDURE — 96372 THER/PROPH/DIAG INJ SC/IM: CPT

## 2022-10-11 RX ADMIN — PEGFILGRASTIM 6 MG: 6 INJECTION SUBCUTANEOUS at 15:38

## 2022-10-11 NOTE — PROGRESS NOTES
Pt here for neulasta injection  Given SQ in L arm  Pt tolerated treatment well  AVS given  Pt walked out of unit safely

## 2022-10-13 ENCOUNTER — APPOINTMENT (OUTPATIENT)
Dept: LAB | Facility: HOSPITAL | Age: 67
End: 2022-10-13
Payer: MEDICARE

## 2022-10-13 DIAGNOSIS — C55 UTERINE CARCINOSARCOMA (HCC): ICD-10-CM

## 2022-10-13 LAB
ALBUMIN SERPL BCP-MCNC: 3.7 G/DL (ref 3.5–5)
ALP SERPL-CCNC: 77 U/L (ref 46–116)
ALT SERPL W P-5'-P-CCNC: 22 U/L (ref 12–78)
ANION GAP SERPL CALCULATED.3IONS-SCNC: 9 MMOL/L (ref 4–13)
AST SERPL W P-5'-P-CCNC: 19 U/L (ref 5–45)
BASOPHILS # BLD MANUAL: 0 THOUSAND/UL (ref 0–0.1)
BASOPHILS NFR MAR MANUAL: 0 % (ref 0–1)
BILIRUB SERPL-MCNC: 0.56 MG/DL (ref 0.2–1)
BUN SERPL-MCNC: 11 MG/DL (ref 5–25)
CALCIUM SERPL-MCNC: 8.7 MG/DL (ref 8.3–10.1)
CHLORIDE SERPL-SCNC: 105 MMOL/L (ref 96–108)
CO2 SERPL-SCNC: 28 MMOL/L (ref 21–32)
CREAT SERPL-MCNC: 0.67 MG/DL (ref 0.6–1.3)
EOSINOPHIL # BLD MANUAL: 0 THOUSAND/UL (ref 0–0.4)
EOSINOPHIL NFR BLD MANUAL: 0 % (ref 0–6)
ERYTHROCYTE [DISTWIDTH] IN BLOOD BY AUTOMATED COUNT: 13.9 % (ref 11.6–15.1)
GFR SERPL CREATININE-BSD FRML MDRD: 89 ML/MIN/1.73SQ M
GLUCOSE P FAST SERPL-MCNC: 99 MG/DL (ref 65–99)
HCT VFR BLD AUTO: 41.6 % (ref 34.8–46.1)
HGB BLD-MCNC: 13 G/DL (ref 11.5–15.4)
LYMPHOCYTES # BLD AUTO: 0.96 THOUSAND/UL (ref 0.6–4.47)
LYMPHOCYTES # BLD AUTO: 4 % (ref 14–44)
MAGNESIUM SERPL-MCNC: 1.8 MG/DL (ref 1.6–2.6)
MCH RBC QN AUTO: 27.2 PG (ref 26.8–34.3)
MCHC RBC AUTO-ENTMCNC: 31.3 G/DL (ref 31.4–37.4)
MCV RBC AUTO: 87 FL (ref 82–98)
METAMYELOCYTES NFR BLD MANUAL: 3 % (ref 0–1)
MONOCYTES # BLD AUTO: 0 THOUSAND/UL (ref 0–1.22)
MONOCYTES NFR BLD: 0 % (ref 4–12)
NEUTROPHILS # BLD MANUAL: 22.41 THOUSAND/UL (ref 1.85–7.62)
NEUTS BAND NFR BLD MANUAL: 8 % (ref 0–8)
NEUTS SEG NFR BLD AUTO: 85 % (ref 43–75)
PLATELET # BLD AUTO: 127 THOUSANDS/UL (ref 149–390)
PLATELET BLD QL SMEAR: ABNORMAL
PMV BLD AUTO: 10.3 FL (ref 8.9–12.7)
POTASSIUM SERPL-SCNC: 4 MMOL/L (ref 3.5–5.3)
PROT SERPL-MCNC: 7 G/DL (ref 6.4–8.4)
RBC # BLD AUTO: 4.78 MILLION/UL (ref 3.81–5.12)
SODIUM SERPL-SCNC: 142 MMOL/L (ref 135–147)
WBC # BLD AUTO: 24.1 THOUSAND/UL (ref 4.31–10.16)

## 2022-10-13 PROCEDURE — 85007 BL SMEAR W/DIFF WBC COUNT: CPT

## 2022-10-13 PROCEDURE — 83735 ASSAY OF MAGNESIUM: CPT

## 2022-10-13 PROCEDURE — 80053 COMPREHEN METABOLIC PANEL: CPT

## 2022-10-13 PROCEDURE — 36415 COLL VENOUS BLD VENIPUNCTURE: CPT

## 2022-10-13 PROCEDURE — 85027 COMPLETE CBC AUTOMATED: CPT

## 2022-10-20 ENCOUNTER — APPOINTMENT (OUTPATIENT)
Dept: LAB | Facility: HOSPITAL | Age: 67
End: 2022-10-20
Payer: MEDICARE

## 2022-10-20 DIAGNOSIS — C55 UTERINE CARCINOSARCOMA (HCC): ICD-10-CM

## 2022-10-20 LAB
ALBUMIN SERPL BCP-MCNC: 3.7 G/DL (ref 3.5–5)
ALP SERPL-CCNC: 95 U/L (ref 46–116)
ALT SERPL W P-5'-P-CCNC: 31 U/L (ref 12–78)
ANION GAP SERPL CALCULATED.3IONS-SCNC: 8 MMOL/L (ref 4–13)
AST SERPL W P-5'-P-CCNC: 18 U/L (ref 5–45)
BASOPHILS # BLD MANUAL: 0 THOUSAND/UL (ref 0–0.1)
BASOPHILS NFR MAR MANUAL: 0 % (ref 0–1)
BILIRUB SERPL-MCNC: 0.23 MG/DL (ref 0.2–1)
BUN SERPL-MCNC: 11 MG/DL (ref 5–25)
CALCIUM SERPL-MCNC: 9.2 MG/DL (ref 8.3–10.1)
CHLORIDE SERPL-SCNC: 106 MMOL/L (ref 96–108)
CO2 SERPL-SCNC: 30 MMOL/L (ref 21–32)
CREAT SERPL-MCNC: 0.59 MG/DL (ref 0.6–1.3)
EOSINOPHIL # BLD MANUAL: 0 THOUSAND/UL (ref 0–0.4)
EOSINOPHIL NFR BLD MANUAL: 0 % (ref 0–6)
ERYTHROCYTE [DISTWIDTH] IN BLOOD BY AUTOMATED COUNT: 13.4 % (ref 11.6–15.1)
GFR SERPL CREATININE-BSD FRML MDRD: 93 ML/MIN/1.73SQ M
GLUCOSE P FAST SERPL-MCNC: 104 MG/DL (ref 65–99)
HCT VFR BLD AUTO: 37.8 % (ref 34.8–46.1)
HGB BLD-MCNC: 12 G/DL (ref 11.5–15.4)
LYMPHOCYTES # BLD AUTO: 2.97 THOUSAND/UL (ref 0.6–4.47)
LYMPHOCYTES # BLD AUTO: 30 % (ref 14–44)
MAGNESIUM SERPL-MCNC: 1.8 MG/DL (ref 1.6–2.6)
MCH RBC QN AUTO: 27.3 PG (ref 26.8–34.3)
MCHC RBC AUTO-ENTMCNC: 31.7 G/DL (ref 31.4–37.4)
MCV RBC AUTO: 86 FL (ref 82–98)
MONOCYTES # BLD AUTO: 0.69 THOUSAND/UL (ref 0–1.22)
MONOCYTES NFR BLD: 7 % (ref 4–12)
NEUTROPHILS # BLD MANUAL: 6.24 THOUSAND/UL (ref 1.85–7.62)
NEUTS BAND NFR BLD MANUAL: 5 % (ref 0–8)
NEUTS SEG NFR BLD AUTO: 58 % (ref 43–75)
PLATELET # BLD AUTO: 137 THOUSANDS/UL (ref 149–390)
PLATELET BLD QL SMEAR: ABNORMAL
PMV BLD AUTO: 9.4 FL (ref 8.9–12.7)
POTASSIUM SERPL-SCNC: 3.6 MMOL/L (ref 3.5–5.3)
PROT SERPL-MCNC: 7 G/DL (ref 6.4–8.4)
RBC # BLD AUTO: 4.4 MILLION/UL (ref 3.81–5.12)
SODIUM SERPL-SCNC: 144 MMOL/L (ref 135–147)
WBC # BLD AUTO: 9.9 THOUSAND/UL (ref 4.31–10.16)

## 2022-10-20 PROCEDURE — 85007 BL SMEAR W/DIFF WBC COUNT: CPT

## 2022-10-20 PROCEDURE — 85027 COMPLETE CBC AUTOMATED: CPT

## 2022-10-20 PROCEDURE — 83735 ASSAY OF MAGNESIUM: CPT

## 2022-10-20 PROCEDURE — 36415 COLL VENOUS BLD VENIPUNCTURE: CPT

## 2022-10-20 PROCEDURE — 80053 COMPREHEN METABOLIC PANEL: CPT

## 2022-10-21 PROBLEM — T45.1X5S NEUTROPENIA ASSOCIATED WITH MUCOSITIS DUE TO ANTINEOPLASTIC THERAPY (HCC): Status: ACTIVE | Noted: 2022-10-21

## 2022-10-21 PROBLEM — D70.1 NEUTROPENIA ASSOCIATED WITH MUCOSITIS DUE TO ANTINEOPLASTIC THERAPY (HCC): Status: ACTIVE | Noted: 2022-10-21

## 2022-10-21 PROBLEM — K12.31 NEUTROPENIA ASSOCIATED WITH MUCOSITIS DUE TO ANTINEOPLASTIC THERAPY (HCC): Status: ACTIVE | Noted: 2022-10-21

## 2022-10-21 NOTE — PROGRESS NOTES
CLARIFY DX RESPONSE NOTE  Patient is undergoing Neulasta support for carboplatin paclitaxel due to neutropenia secondary to antineoplastic agents

## 2022-10-21 NOTE — ADDENDUM NOTE
Encounter addended by: Dilip Manzano MD on: 10/21/2022 1:59 PM   Actions taken: Problem List modified, Clinical Note Signed, Problem List reviewed

## 2022-10-26 ENCOUNTER — OFFICE VISIT (OUTPATIENT)
Dept: GYNECOLOGIC ONCOLOGY | Facility: CLINIC | Age: 67
End: 2022-10-26

## 2022-10-26 VITALS
RESPIRATION RATE: 16 BRPM | HEIGHT: 61 IN | DIASTOLIC BLOOD PRESSURE: 64 MMHG | OXYGEN SATURATION: 97 % | BODY MASS INDEX: 23.58 KG/M2 | HEART RATE: 98 BPM | TEMPERATURE: 97.4 F | SYSTOLIC BLOOD PRESSURE: 110 MMHG | WEIGHT: 124.9 LBS

## 2022-10-26 DIAGNOSIS — C55 UTERINE CARCINOSARCOMA (HCC): Primary | ICD-10-CM

## 2022-10-26 PROCEDURE — 99024 POSTOP FOLLOW-UP VISIT: CPT | Performed by: OBSTETRICS & GYNECOLOGY

## 2022-10-26 NOTE — PROGRESS NOTES
Assessment/Plan:    Problem List Items Addressed This Visit        Genitourinary    Uterine carcinosarcoma ([facility] ) - Primary     Patient is stable postoperatively  She is tolerating chemo well  She has tolerated Neulasta marginally  We have recommended the use of Claritin plus Aleve for 3 days post treatment  We have discussed as an alternative using daily Neupogen  The patient would prefer to continue with the Claritin and Aleve and Neulasta  We will continue present treatment plan at present dosage  CHIEF COMPLAINT:  Consideration of cycle 3 carboplatin paclitaxel for carcinosarcoma of endometrium stage IA  Problem:  Cancer Staging  Uterine carcinosarcoma [facility])  Staging form: Corpus Uteri - Carcinoma, AJCC 8th Edition  - Clinical stage from 8/26/2022: FIGO Stage IA (cT1a, cN0, cM0) - Signed by Sofía Luna MD on 8/26/2022        Previous therapy:  Oncology History   Uterine carcinosarcoma ([facility] )   8/18/2022 Initial Diagnosis    Uterine carcinosarcoma ([place] [address] )     8/26/2022 -  Cancer Staged    Staging form: Corpus Uteri - Carcinoma, AJCC 8th Edition  - Clinical stage from 8/26/2022: FIGO Stage IA (cT1a, cN0, cM0) - Signed by Sofía Luna MD on 8/26/2022  Histopathologic type: Carcinosarcoma  Stage prefix: Initial diagnosis  Histologic grade (G): G3  Histologic grading system: 3 grade system       8/2022 Surgery    Patient and did went robotic hysterectomy bilateral salpingo-oophorectomy sentinel lymph node mapping and biopsy for stage I A non invasive carcinosarcoma of the endometrium  The uterus did fracture within the vagina but no leakage into the abdomen was noted       9/19/2022 -  Chemotherapy    palonosetron (ALOXI), 0 25 mg, Intravenous, Once, 2 of 6 cycles  Administration: 0 25 mg (9/19/2022), 0 25 mg (10/10/2022)  pegfilgrastim (NEULASTA), 6 mg, Subcutaneous, Once, 1 of 5 cycles  Administration: 6 mg (10/11/2022)  fosaprepitant (EMEND) IVPB, 150 mg, Intravenous, Once, 2 of 6 cycles  Administration: 150 mg (9/19/2022), 150 mg (10/10/2022)  CARBOplatin (PARAPLATIN) IVPB (GOG AUC DOSING), 572 4 mg, Intravenous, Once, 2 of 6 cycles  Administration: 572 4 mg (9/19/2022), 579 6 mg (10/10/2022)  PACLItaxel (TAXOL) chemo IVPB, 175 mg/m2 = 274 8 mg, Intravenous, Once, 2 of 6 cycles  Administration: 274 8 mg (9/19/2022), 278 4 mg (10/10/2022)           Patient ID: Bharat Tamayo is a 69 y o  female  Patient is very pleasant 38-year-old female with history of stage IA uterine carcinosarcoma  She underwent robotic hysterectomy BSO and staging  She subsequently underwent 2 cycles of carboplatin paclitaxel  After her 1st cycle the patient feels felt well however her blood counts were significantly lower and Neulasta has been added  I reviewed the patient's weekly CBC and CPM P data the are all stable for present treatment at present dosage  Today, the patient is doing well  She denies significant abdominal pain, pelvic pain, nausea, vomiting, constipation, diarrhea, fevers, chills, or vaginal bleeding  Patient does have some degree of arthralgias and myalgias with Neulasta for the 1st 3 days after treatment these resolved  The patient did use Claritin but not believe  Presently she is doing well  The following portions of the patient's history were reviewed and updated as appropriate: allergies, current medications, past family history, past social history, past surgical history and problem list     Review of Systems   Constitutional: Negative  HENT: Negative  Eyes: Negative  Respiratory: Negative  Cardiovascular: Negative  Gastrointestinal: Negative  Endocrine: Negative  Genitourinary: Negative  Musculoskeletal: Negative  Skin: Negative  Neurological: Negative  Hematological: Negative  Psychiatric/Behavioral: Negative          Current Outpatient Medications   Medication Sig Dispense Refill   • cholecalciferol (VITAMIN D3) 1,000 units tablet Take 1,000 Units by mouth daily     • cyanocobalamin (VITAMIN B-12) 100 MCG tablet Take 100 mcg by mouth daily     • escitalopram (LEXAPRO) 10 mg tablet Take 1 tablet (10 mg total) by mouth daily 90 tablet 3   • loratadine-pseudoephedrine (CLARITIN-D 24-HOUR)  mg per 24 hr tablet Take 1 tablet by mouth daily     • LORazepam (ATIVAN) 1 mg tablet Take 1 tablet (1 mg total) by mouth every 6 (six) hours as needed for anxiety (or nausea) 36 tablet 1   • Multiple Vitamins-Minerals (multivitamin with minerals) tablet Take 1 tablet by mouth daily     • Multiple Vitamins-Minerals (OCUVITE PO) Take by mouth     • Multiple Vitamins-Minerals (OCUVITE PO) Take by mouth in the morning     • naloxone (NARCAN) 4 mg/0 1 mL nasal spray Administer 1 spray into a nostril  If no response after 2-3 minutes, give another dose in the other nostril using a new spray  1 each 1   • ondansetron (ZOFRAN) 8 mg tablet TAKE 1 TABLET (8 MG TOTAL) BY MOUTH EVERY 8 (EIGHT) HOURS AS NEEDED FOR NAUSEA OR VOMITING 30 tablet 1   • rosuvastatin (CRESTOR) 5 mg tablet Take 1 tablet (5 mg total) by mouth daily 90 tablet 3     No current facility-administered medications for this visit  Objective:    Blood pressure 110/64, pulse 98, temperature (!) 97 4 °F (36 3 °C), temperature source Tympanic, resp  rate 16, height 5' 1 3" (1 557 m), weight 56 7 kg (124 lb 14 4 oz), SpO2 97 %, not currently breastfeeding  Body mass index is 23 37 kg/m²  Body surface area is 1 55 meters squared  Physical Exam  Constitutional:       Appearance: She is well-developed  HENT:      Head: Normocephalic and atraumatic  Neck:      Thyroid: No thyromegaly  Cardiovascular:      Rate and Rhythm: Normal rate and regular rhythm  Heart sounds: Normal heart sounds  Pulmonary:      Effort: Pulmonary effort is normal       Breath sounds: Normal breath sounds  Abdominal:      General: Bowel sounds are normal       Palpations: Abdomen is soft        Comments: Well healed laparoscopic incisions  Genitourinary:     Comments: Deferred  Musculoskeletal:         General: Normal range of motion  Cervical back: Normal range of motion and neck supple  Lymphadenopathy:      Cervical: No cervical adenopathy  Skin:     General: Skin is warm and dry  Neurological:      Mental Status: She is alert and oriented to person, place, and time     Psychiatric:         Behavior: Behavior normal          Lab Results   Component Value Date     67 6 (H) 09/29/2022     Lab Results   Component Value Date    K 3 6 10/20/2022     10/20/2022    CO2 30 10/20/2022    BUN 11 10/20/2022    CREATININE 0 59 (L) 10/20/2022    GLUF 104 (H) 10/20/2022    CALCIUM 9 2 10/20/2022    AST 18 10/20/2022    ALT 31 10/20/2022    ALKPHOS 95 10/20/2022    EGFR 93 10/20/2022     Lab Results   Component Value Date    WBC 9 90 10/20/2022    HGB 12 0 10/20/2022    HCT 37 8 10/20/2022    MCV 86 10/20/2022     (L) 10/20/2022     Lab Results   Component Value Date    NEUTROABS 0 92 (L) 10/06/2022        Trend:  Lab Results   Component Value Date     67 6 (H) 09/29/2022     85 4 (H) 09/22/2022     84 9 (H) 09/16/2022

## 2022-10-26 NOTE — ASSESSMENT & PLAN NOTE
Patient is stable postoperatively  She is tolerating chemo well  She has tolerated Neulasta marginally  We have recommended the use of Claritin plus Aleve for 3 days post treatment  We have discussed as an alternative using daily Neupogen  The patient would prefer to continue with the Claritin and Aleve and Neulasta  We will continue present treatment plan at present dosage

## 2022-10-27 ENCOUNTER — APPOINTMENT (OUTPATIENT)
Dept: LAB | Facility: HOSPITAL | Age: 67
End: 2022-10-27
Payer: MEDICARE

## 2022-10-27 DIAGNOSIS — C55 UTERINE CARCINOSARCOMA (HCC): ICD-10-CM

## 2022-10-27 LAB
ALBUMIN SERPL BCP-MCNC: 3.6 G/DL (ref 3.5–5)
ALP SERPL-CCNC: 89 U/L (ref 46–116)
ALT SERPL W P-5'-P-CCNC: 25 U/L (ref 12–78)
ANION GAP SERPL CALCULATED.3IONS-SCNC: 2 MMOL/L (ref 4–13)
AST SERPL W P-5'-P-CCNC: 17 U/L (ref 5–45)
BASOPHILS # BLD AUTO: 0.03 THOUSANDS/ÂΜL (ref 0–0.1)
BASOPHILS NFR BLD AUTO: 0 % (ref 0–1)
BILIRUB SERPL-MCNC: 0.36 MG/DL (ref 0.2–1)
BUN SERPL-MCNC: 12 MG/DL (ref 5–25)
CALCIUM SERPL-MCNC: 9.4 MG/DL (ref 8.3–10.1)
CANCER AG125 SERPL-ACNC: 45.5 U/ML (ref 0–30)
CHLORIDE SERPL-SCNC: 108 MMOL/L (ref 96–108)
CO2 SERPL-SCNC: 28 MMOL/L (ref 21–32)
CREAT SERPL-MCNC: 0.66 MG/DL (ref 0.6–1.3)
EOSINOPHIL # BLD AUTO: 0.02 THOUSAND/ÂΜL (ref 0–0.61)
EOSINOPHIL NFR BLD AUTO: 0 % (ref 0–6)
ERYTHROCYTE [DISTWIDTH] IN BLOOD BY AUTOMATED COUNT: 15 % (ref 11.6–15.1)
GFR SERPL CREATININE-BSD FRML MDRD: 90 ML/MIN/1.73SQ M
GLUCOSE P FAST SERPL-MCNC: 97 MG/DL (ref 65–99)
HCT VFR BLD AUTO: 38.8 % (ref 34.8–46.1)
HGB BLD-MCNC: 12.1 G/DL (ref 11.5–15.4)
IMM GRANULOCYTES # BLD AUTO: 0.07 THOUSAND/UL (ref 0–0.2)
IMM GRANULOCYTES NFR BLD AUTO: 1 % (ref 0–2)
LYMPHOCYTES # BLD AUTO: 2.09 THOUSANDS/ÂΜL (ref 0.6–4.47)
LYMPHOCYTES NFR BLD AUTO: 28 % (ref 14–44)
MAGNESIUM SERPL-MCNC: 2 MG/DL (ref 1.6–2.6)
MCH RBC QN AUTO: 27.1 PG (ref 26.8–34.3)
MCHC RBC AUTO-ENTMCNC: 31.2 G/DL (ref 31.4–37.4)
MCV RBC AUTO: 87 FL (ref 82–98)
MONOCYTES # BLD AUTO: 0.45 THOUSAND/ÂΜL (ref 0.17–1.22)
MONOCYTES NFR BLD AUTO: 6 % (ref 4–12)
NEUTROPHILS # BLD AUTO: 4.88 THOUSANDS/ÂΜL (ref 1.85–7.62)
NEUTS SEG NFR BLD AUTO: 65 % (ref 43–75)
NRBC BLD AUTO-RTO: 0 /100 WBCS
PLATELET # BLD AUTO: 485 THOUSANDS/UL (ref 149–390)
PMV BLD AUTO: 8.5 FL (ref 8.9–12.7)
POTASSIUM SERPL-SCNC: 4.6 MMOL/L (ref 3.5–5.3)
PROT SERPL-MCNC: 6.8 G/DL (ref 6.4–8.4)
RBC # BLD AUTO: 4.47 MILLION/UL (ref 3.81–5.12)
SODIUM SERPL-SCNC: 138 MMOL/L (ref 135–147)
WBC # BLD AUTO: 7.54 THOUSAND/UL (ref 4.31–10.16)

## 2022-10-27 PROCEDURE — 85025 COMPLETE CBC W/AUTO DIFF WBC: CPT

## 2022-10-27 PROCEDURE — 80053 COMPREHEN METABOLIC PANEL: CPT

## 2022-10-27 PROCEDURE — 36415 COLL VENOUS BLD VENIPUNCTURE: CPT

## 2022-10-27 PROCEDURE — 83735 ASSAY OF MAGNESIUM: CPT

## 2022-10-27 PROCEDURE — 86304 IMMUNOASSAY TUMOR CA 125: CPT

## 2022-10-28 DIAGNOSIS — C55 UTERINE CARCINOSARCOMA (HCC): Primary | ICD-10-CM

## 2022-10-28 RX ORDER — PALONOSETRON 0.05 MG/ML
0.25 INJECTION, SOLUTION INTRAVENOUS ONCE
Status: CANCELLED | OUTPATIENT
Start: 2022-10-31

## 2022-10-28 RX ORDER — SODIUM CHLORIDE 9 MG/ML
20 INJECTION, SOLUTION INTRAVENOUS ONCE
Status: CANCELLED | OUTPATIENT
Start: 2022-10-31

## 2022-10-31 ENCOUNTER — HOSPITAL ENCOUNTER (OUTPATIENT)
Dept: INFUSION CENTER | Facility: CLINIC | Age: 67
Discharge: HOME | End: 2022-10-31

## 2022-10-31 VITALS
SYSTOLIC BLOOD PRESSURE: 131 MMHG | RESPIRATION RATE: 16 BRPM | TEMPERATURE: 97.5 F | HEIGHT: 61 IN | HEART RATE: 87 BPM | DIASTOLIC BLOOD PRESSURE: 69 MMHG | WEIGHT: 130 LBS | BODY MASS INDEX: 24.55 KG/M2

## 2022-10-31 DIAGNOSIS — C55 UTERINE CARCINOSARCOMA (HCC): Primary | ICD-10-CM

## 2022-10-31 LAB
BASOPHILS # BLD AUTO: 0.03 THOUSANDS/ÂΜL (ref 0–0.1)
BASOPHILS NFR BLD AUTO: 1 % (ref 0–1)
EOSINOPHIL # BLD AUTO: 0.01 THOUSAND/ÂΜL (ref 0–0.61)
EOSINOPHIL NFR BLD AUTO: 0 % (ref 0–6)
ERYTHROCYTE [DISTWIDTH] IN BLOOD BY AUTOMATED COUNT: 15.5 % (ref 11.6–15.1)
HCT VFR BLD AUTO: 38.6 % (ref 34.8–46.1)
HGB BLD-MCNC: 12.2 G/DL (ref 11.5–15.4)
IMM GRANULOCYTES # BLD AUTO: 0.03 THOUSAND/UL (ref 0–0.2)
IMM GRANULOCYTES NFR BLD AUTO: 1 % (ref 0–2)
LYMPHOCYTES # BLD AUTO: 1.5 THOUSANDS/ÂΜL (ref 0.6–4.47)
LYMPHOCYTES NFR BLD AUTO: 24 % (ref 14–44)
MCH RBC QN AUTO: 27.5 PG (ref 26.8–34.3)
MCHC RBC AUTO-ENTMCNC: 31.6 G/DL (ref 31.4–37.4)
MCV RBC AUTO: 87 FL (ref 82–98)
MONOCYTES # BLD AUTO: 0.54 THOUSAND/ÂΜL (ref 0.17–1.22)
MONOCYTES NFR BLD AUTO: 9 % (ref 4–12)
NEUTROPHILS # BLD AUTO: 4.1 THOUSANDS/ÂΜL (ref 1.85–7.62)
NEUTS SEG NFR BLD AUTO: 65 % (ref 43–75)
NRBC BLD AUTO-RTO: 0 /100 WBCS
PLATELET # BLD AUTO: 598 THOUSANDS/UL (ref 149–390)
PMV BLD AUTO: 8.8 FL (ref 8.9–12.7)
RBC # BLD AUTO: 4.44 MILLION/UL (ref 3.81–5.12)
WBC # BLD AUTO: 6.21 THOUSAND/UL (ref 4.31–10.16)

## 2022-10-31 RX ORDER — SODIUM CHLORIDE 9 MG/ML
20 INJECTION, SOLUTION INTRAVENOUS ONCE
Status: COMPLETED | OUTPATIENT
Start: 2022-10-31 | End: 2022-10-31

## 2022-10-31 RX ORDER — PALONOSETRON 0.05 MG/ML
0.25 INJECTION, SOLUTION INTRAVENOUS ONCE
Status: COMPLETED | OUTPATIENT
Start: 2022-10-31 | End: 2022-10-31

## 2022-10-31 RX ADMIN — CARBOPLATIN 579.6 MG: 10 INJECTION, SOLUTION INTRAVENOUS at 14:33

## 2022-10-31 RX ADMIN — FOSAPREPITANT 150 MG: 150 INJECTION, POWDER, LYOPHILIZED, FOR SOLUTION INTRAVENOUS at 10:44

## 2022-10-31 RX ADMIN — SODIUM CHLORIDE 20 ML/HR: 0.9 INJECTION, SOLUTION INTRAVENOUS at 09:55

## 2022-10-31 RX ADMIN — FAMOTIDINE 20 MG: 10 INJECTION, SOLUTION INTRAVENOUS at 10:20

## 2022-10-31 RX ADMIN — DIPHENHYDRAMINE HYDROCHLORIDE 25 MG: 50 INJECTION, SOLUTION INTRAMUSCULAR; INTRAVENOUS at 09:55

## 2022-10-31 RX ADMIN — PACLITAXEL 278.4 MG: 6 INJECTION, SOLUTION, CONCENTRATE INTRAVENOUS at 11:30

## 2022-10-31 RX ADMIN — DEXAMETHASONE SODIUM PHOSPHATE 20 MG: 10 INJECTION, SOLUTION INTRAMUSCULAR; INTRAVENOUS at 09:31

## 2022-10-31 RX ADMIN — PALONOSETRON 0.25 MG: 0.05 INJECTION, SOLUTION INTRAVENOUS at 09:26

## 2022-10-31 NOTE — PROGRESS NOTES
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

## 2022-11-01 ENCOUNTER — HOSPITAL ENCOUNTER (OUTPATIENT)
Dept: INFUSION CENTER | Facility: CLINIC | Age: 67
Discharge: HOME | End: 2022-11-01

## 2022-11-01 DIAGNOSIS — C55 UTERINE CARCINOSARCOMA (HCC): Primary | ICD-10-CM

## 2022-11-01 RX ADMIN — PEGFILGRASTIM 6 MG: 6 INJECTION SUBCUTANEOUS at 15:41

## 2022-11-01 NOTE — PROGRESS NOTES
Pt here for neulasta injection  Offers no complaints  Tolerated injection in the left arm without incident  AVS given  Walked out in stable condition

## 2022-11-03 ENCOUNTER — APPOINTMENT (OUTPATIENT)
Dept: LAB | Facility: HOSPITAL | Age: 67
End: 2022-11-03

## 2022-11-03 DIAGNOSIS — C55 UTERINE CARCINOSARCOMA (HCC): ICD-10-CM

## 2022-11-03 DIAGNOSIS — C54.1 MALIGNANT NEOPLASM OF ENDOMETRIUM (HCC): ICD-10-CM

## 2022-11-03 LAB
ALBUMIN SERPL BCP-MCNC: 3.4 G/DL (ref 3.5–5)
ALP SERPL-CCNC: 91 U/L (ref 46–116)
ALT SERPL W P-5'-P-CCNC: 26 U/L (ref 12–78)
ANION GAP SERPL CALCULATED.3IONS-SCNC: 7 MMOL/L (ref 4–13)
AST SERPL W P-5'-P-CCNC: 22 U/L (ref 5–45)
BASOPHILS # BLD MANUAL: 0 THOUSAND/UL (ref 0–0.1)
BASOPHILS NFR MAR MANUAL: 0 % (ref 0–1)
BILIRUB SERPL-MCNC: 0.55 MG/DL (ref 0.2–1)
BUN SERPL-MCNC: 12 MG/DL (ref 5–25)
CALCIUM ALBUM COR SERPL-MCNC: 9.3 MG/DL (ref 8.3–10.1)
CALCIUM SERPL-MCNC: 8.8 MG/DL (ref 8.3–10.1)
CHLORIDE SERPL-SCNC: 108 MMOL/L (ref 96–108)
CO2 SERPL-SCNC: 26 MMOL/L (ref 21–32)
CREAT SERPL-MCNC: 0.62 MG/DL (ref 0.6–1.3)
EOSINOPHIL # BLD MANUAL: 0 THOUSAND/UL (ref 0–0.4)
EOSINOPHIL NFR BLD MANUAL: 0 % (ref 0–6)
ERYTHROCYTE [DISTWIDTH] IN BLOOD BY AUTOMATED COUNT: 16.3 % (ref 11.6–15.1)
GFR SERPL CREATININE-BSD FRML MDRD: 92 ML/MIN/1.73SQ M
GLUCOSE P FAST SERPL-MCNC: 91 MG/DL (ref 65–99)
HCT VFR BLD AUTO: 38.4 % (ref 34.8–46.1)
HGB BLD-MCNC: 12 G/DL (ref 11.5–15.4)
LYMPHOCYTES # BLD AUTO: 1.42 THOUSAND/UL (ref 0.6–4.47)
LYMPHOCYTES # BLD AUTO: 5 % (ref 14–44)
MAGNESIUM SERPL-MCNC: 1.9 MG/DL (ref 1.6–2.6)
MCH RBC QN AUTO: 27.4 PG (ref 26.8–34.3)
MCHC RBC AUTO-ENTMCNC: 31.3 G/DL (ref 31.4–37.4)
MCV RBC AUTO: 88 FL (ref 82–98)
MONOCYTES # BLD AUTO: 0 THOUSAND/UL (ref 0–1.22)
MONOCYTES NFR BLD: 0 % (ref 4–12)
NEUTROPHILS # BLD MANUAL: 26.93 THOUSAND/UL (ref 1.85–7.62)
NEUTS BAND NFR BLD MANUAL: 3 % (ref 0–8)
NEUTS SEG NFR BLD AUTO: 92 % (ref 43–75)
PLATELET # BLD AUTO: 450 THOUSANDS/UL (ref 149–390)
PLATELET BLD QL SMEAR: ABNORMAL
PMV BLD AUTO: 9.6 FL (ref 8.9–12.7)
POTASSIUM SERPL-SCNC: 4.3 MMOL/L (ref 3.5–5.3)
PROT SERPL-MCNC: 6.5 G/DL (ref 6.4–8.4)
RBC # BLD AUTO: 4.38 MILLION/UL (ref 3.81–5.12)
RBC MORPH BLD: NORMAL
SODIUM SERPL-SCNC: 141 MMOL/L (ref 135–147)
WBC # BLD AUTO: 28.35 THOUSAND/UL (ref 4.31–10.16)

## 2022-11-10 ENCOUNTER — TELEPHONE (OUTPATIENT)
Dept: SURGICAL ONCOLOGY | Facility: CLINIC | Age: 67
End: 2022-11-10

## 2022-11-10 ENCOUNTER — APPOINTMENT (OUTPATIENT)
Dept: LAB | Facility: HOSPITAL | Age: 67
End: 2022-11-10

## 2022-11-10 DIAGNOSIS — C55 UTERINE CARCINOSARCOMA (HCC): ICD-10-CM

## 2022-11-10 LAB
ALBUMIN SERPL BCP-MCNC: 3.5 G/DL (ref 3.5–5)
ALP SERPL-CCNC: 140 U/L (ref 46–116)
ALT SERPL W P-5'-P-CCNC: 22 U/L (ref 12–78)
ANION GAP SERPL CALCULATED.3IONS-SCNC: 5 MMOL/L (ref 4–13)
AST SERPL W P-5'-P-CCNC: 24 U/L (ref 5–45)
BASOPHILS # BLD MANUAL: 0 THOUSAND/UL (ref 0–0.1)
BASOPHILS NFR MAR MANUAL: 0 % (ref 0–1)
BILIRUB SERPL-MCNC: 0.18 MG/DL (ref 0.2–1)
BUN SERPL-MCNC: 13 MG/DL (ref 5–25)
CALCIUM SERPL-MCNC: 8.8 MG/DL (ref 8.3–10.1)
CHLORIDE SERPL-SCNC: 108 MMOL/L (ref 96–108)
CO2 SERPL-SCNC: 28 MMOL/L (ref 21–32)
CREAT SERPL-MCNC: 0.71 MG/DL (ref 0.6–1.3)
EOSINOPHIL # BLD MANUAL: 1.14 THOUSAND/UL (ref 0–0.4)
EOSINOPHIL NFR BLD MANUAL: 3 % (ref 0–6)
ERYTHROCYTE [DISTWIDTH] IN BLOOD BY AUTOMATED COUNT: 16.3 % (ref 11.6–15.1)
GFR SERPL CREATININE-BSD FRML MDRD: 87 ML/MIN/1.73SQ M
GLUCOSE P FAST SERPL-MCNC: 90 MG/DL (ref 65–99)
HCT VFR BLD AUTO: 37 % (ref 34.8–46.1)
HGB BLD-MCNC: 11.6 G/DL (ref 11.5–15.4)
LYMPHOCYTES # BLD AUTO: 25 % (ref 14–44)
LYMPHOCYTES # BLD AUTO: 9.49 THOUSAND/UL (ref 0.6–4.47)
MAGNESIUM SERPL-MCNC: 2 MG/DL (ref 1.6–2.6)
MCH RBC QN AUTO: 27.4 PG (ref 26.8–34.3)
MCHC RBC AUTO-ENTMCNC: 31.4 G/DL (ref 31.4–37.4)
MCV RBC AUTO: 88 FL (ref 82–98)
MONOCYTES # BLD AUTO: 2.28 THOUSAND/UL (ref 0–1.22)
MONOCYTES NFR BLD: 6 % (ref 4–12)
NEUTROPHILS # BLD MANUAL: 25.04 THOUSAND/UL (ref 1.85–7.62)
NEUTS BAND NFR BLD MANUAL: 2 % (ref 0–8)
NEUTS SEG NFR BLD AUTO: 64 % (ref 43–75)
PLATELET # BLD AUTO: 332 THOUSANDS/UL (ref 149–390)
PLATELET BLD QL SMEAR: ADEQUATE
PMV BLD AUTO: 9.4 FL (ref 8.9–12.7)
POTASSIUM SERPL-SCNC: 4 MMOL/L (ref 3.5–5.3)
PROT SERPL-MCNC: 6.7 G/DL (ref 6.4–8.4)
RBC # BLD AUTO: 4.23 MILLION/UL (ref 3.81–5.12)
SODIUM SERPL-SCNC: 141 MMOL/L (ref 135–147)
WBC # BLD AUTO: 37.94 THOUSAND/UL (ref 4.31–10.16)

## 2022-11-10 NOTE — TELEPHONE ENCOUNTER
Christine from the lab called with critical results for patient ordered by Fausto rFanks  Her WBC is 37 94

## 2022-11-16 ENCOUNTER — OFFICE VISIT (OUTPATIENT)
Dept: GYNECOLOGIC ONCOLOGY | Facility: CLINIC | Age: 67
End: 2022-11-16

## 2022-11-16 VITALS
HEIGHT: 61 IN | TEMPERATURE: 97.9 F | BODY MASS INDEX: 24.66 KG/M2 | WEIGHT: 130.6 LBS | DIASTOLIC BLOOD PRESSURE: 70 MMHG | RESPIRATION RATE: 18 BRPM | HEART RATE: 115 BPM | OXYGEN SATURATION: 99 % | SYSTOLIC BLOOD PRESSURE: 108 MMHG

## 2022-11-16 DIAGNOSIS — C55 UTERINE CARCINOSARCOMA (HCC): Primary | ICD-10-CM

## 2022-11-16 NOTE — PROGRESS NOTES
Assessment/Plan:    Problem List Items Addressed This Visit        Genitourinary    Uterine carcinosarcoma ([facility] [address] ) - Primary     Patient is very pleasant 78-year-old female with history of stage 1A carcinosarcoma of the endometrium status post robotic surgery  She has completed 3 cycles of adjuvant carboplatin paclitaxel  She is tolerating this quite well  Her  is drifting downward  Her white blood cell count is responding appropriately  She is controlling the arthralgias and myalgias with Claritin and Aleve  Patient will continue present treatment at present dosage through cycle 6 and then plan vaginal brachytherapy  CHIEF COMPLAINT:  Consideration of cycle 4 carboplatin paclitaxel for stage IA carcinosarcoma of the endometrium    Problem:  Cancer Staging   Uterine carcinosarcoma [facility])  Staging form: Corpus Uteri - Carcinoma, AJCC 8th Edition  - Clinical stage from 8/26/2022: FIGO Stage IA (cT1a, cN0, cM0) - Signed by Payal Rodríguez MD on 8/26/2022        Previous therapy:  Oncology History   Uterine carcinosarcoma ([facility] [address] )   8/18/2022 Initial Diagnosis    Uterine carcinosarcoma ([facility] [address] )     8/26/2022 -  Cancer Staged    Staging form: Corpus Uteri - Carcinoma, AJCC 8th Edition  - Clinical stage from 8/26/2022: FIGO Stage IA (cT1a, cN0, cM0) - Signed by Payal Rodríguez MD on 8/26/2022  Histopathologic type: Carcinosarcoma  Stage prefix: Initial diagnosis  Histologic grade (G): G3  Histologic grading system: 3 grade system       8/2022 Surgery    Patient and did went robotic hysterectomy bilateral salpingo-oophorectomy sentinel lymph node mapping and biopsy for stage I A non invasive carcinosarcoma of the endometrium  The uterus did fracture within the vagina but no leakage into the abdomen was noted       9/19/2022 -  Chemotherapy    palonosetron (ALOXI), 0 25 mg, Intravenous, Once, 3 of 6 cycles  Administration: 0 25 mg (9/19/2022), 0 25 mg (10/10/2022), 0 25 mg (10/31/2022)  pegfilgrastim (NEULASTA), 6 mg, Subcutaneous, Once, 2 of 5 cycles  Administration: 6 mg (10/11/2022), 6 mg (11/1/2022)  fosaprepitant (EMEND) IVPB, 150 mg, Intravenous, Once, 3 of 6 cycles  Administration: 150 mg (9/19/2022), 150 mg (10/10/2022), 150 mg (10/31/2022)  CARBOplatin (PARAPLATIN) IVPB (GOG AUC DOSING), 572 4 mg, Intravenous, Once, 3 of 6 cycles  Administration: 572 4 mg (9/19/2022), 579 6 mg (10/10/2022), 579 6 mg (10/31/2022)  PACLItaxel (TAXOL) chemo IVPB, 175 mg/m2 = 274 8 mg, Intravenous, Once, 3 of 6 cycles  Administration: 274 8 mg (9/19/2022), 278 4 mg (10/10/2022), 278 4 mg (10/31/2022)           Patient ID: Shellie Cho is a 69 y o  female  Patient is very pleasant 64-year-old female with history of stage IA carcinosarcoma of the endometrium  She underwent robotic hysterectomy BSO and sentinel lymph node mapping and biopsy followed by 3 cycles of carboplatin paclitaxel  She presents today in consideration of cycle 4  She is tolerating her treatment reasonably well  I reviewed the patient's weekly CBC CPM P and   They are all stable for present treatment at present dosage  The patient's white blood cell count maxed at thirty-seven due to Neulasta usage  Patient was having issues with post Neulasta arthralgias and myalgias  She added believe and an antihistamine to her regimen and is feeling much better  Overall she has no complaint  Today, the patient is doing well  She denies significant abdominal pain, pelvic pain, nausea, vomiting, constipation, diarrhea, fevers, chills, or vaginal bleeding  The following portions of the patient's history were reviewed and updated as appropriate: allergies, current medications, past medical history, past social history, past surgical history and problem list     Review of Systems   Constitutional: Negative  HENT: Negative  Eyes: Negative  Respiratory: Negative  Cardiovascular: Negative  Gastrointestinal: Negative      Endocrine: Negative  Genitourinary: Negative  Musculoskeletal: Negative  Skin: Negative  Neurological: Negative  Hematological: Negative  Psychiatric/Behavioral: Negative  Current Outpatient Medications   Medication Sig Dispense Refill   • cholecalciferol (VITAMIN D3) 1,000 units tablet Take 1,000 Units by mouth daily     • cyanocobalamin (VITAMIN B-12) 100 MCG tablet Take 100 mcg by mouth daily     • escitalopram (LEXAPRO) 10 mg tablet Take 1 tablet (10 mg total) by mouth daily 90 tablet 3   • loratadine-pseudoephedrine (CLARITIN-D 24-HOUR)  mg per 24 hr tablet Take 1 tablet by mouth daily     • LORazepam (ATIVAN) 1 mg tablet Take 1 tablet (1 mg total) by mouth every 6 (six) hours as needed for anxiety (or nausea) 36 tablet 1   • Multiple Vitamins-Minerals (multivitamin with minerals) tablet Take 1 tablet by mouth daily     • Multiple Vitamins-Minerals (OCUVITE PO) Take by mouth     • Multiple Vitamins-Minerals (OCUVITE PO) Take by mouth in the morning     • naloxone (NARCAN) 4 mg/0 1 mL nasal spray Administer 1 spray into a nostril  If no response after 2-3 minutes, give another dose in the other nostril using a new spray  1 each 1   • ondansetron (ZOFRAN) 8 mg tablet TAKE 1 TABLET (8 MG TOTAL) BY MOUTH EVERY 8 (EIGHT) HOURS AS NEEDED FOR NAUSEA OR VOMITING 30 tablet 1   • rosuvastatin (CRESTOR) 5 mg tablet Take 1 tablet (5 mg total) by mouth daily 90 tablet 3     No current facility-administered medications for this visit  Objective:    Blood pressure 108/70, pulse (!) 115, temperature 97 9 °F (36 6 °C), temperature source Tympanic, resp  rate 18, height 5' 1 3" (1 557 m), weight 59 2 kg (130 lb 9 6 oz), SpO2 99 %, not currently breastfeeding  Body mass index is 24 44 kg/m²  Body surface area is 1 58 meters squared  Physical Exam  Constitutional:       Appearance: She is well-developed and well-nourished  HENT:      Head: Normocephalic and atraumatic     Eyes:      Extraocular Movements: EOM normal    Neck:      Thyroid: No thyromegaly  Cardiovascular:      Rate and Rhythm: Normal rate and regular rhythm  Heart sounds: Normal heart sounds  Pulmonary:      Effort: Pulmonary effort is normal       Breath sounds: Normal breath sounds  Abdominal:      General: Bowel sounds are normal       Palpations: Abdomen is soft  Comments: Well healed laparoscopic incisions  Genitourinary:     Comments: deferred    Musculoskeletal:         General: Normal range of motion  Cervical back: Normal range of motion and neck supple  Lymphadenopathy:      Cervical: No cervical adenopathy  Skin:     General: Skin is warm and dry  Neurological:      Mental Status: She is alert and oriented to person, place, and time     Psychiatric:         Mood and Affect: Mood and affect normal          Behavior: Behavior normal          Lab Results   Component Value Date     45 5 (H) 10/27/2022     Lab Results   Component Value Date    K 4 0 11/10/2022     11/10/2022    CO2 28 11/10/2022    BUN 13 11/10/2022    CREATININE 0 71 11/10/2022    GLUF 90 11/10/2022    CALCIUM 8 8 11/10/2022    CORRECTEDCA 9 3 11/03/2022    AST 24 11/10/2022    ALT 22 11/10/2022    ALKPHOS 140 (H) 11/10/2022    EGFR 87 11/10/2022     Lab Results   Component Value Date    WBC 37 94 (HH) 11/10/2022    HGB 11 6 11/10/2022    HCT 37 0 11/10/2022    MCV 88 11/10/2022     11/10/2022     Lab Results   Component Value Date    NEUTROABS 4 10 10/31/2022        Trend:  Lab Results   Component Value Date     45 5 (H) 10/27/2022     67 6 (H) 09/29/2022     85 4 (H) 09/22/2022     84 9 (H) 09/16/2022

## 2022-11-16 NOTE — ASSESSMENT & PLAN NOTE
Patient is very pleasant 56-year-old female with history of stage 1A carcinosarcoma of the endometrium status post robotic surgery  She has completed 3 cycles of adjuvant carboplatin paclitaxel  She is tolerating this quite well  Her  is drifting downward  Her white blood cell count is responding appropriately  She is controlling the arthralgias and myalgias with Claritin and Aleve  Patient will continue present treatment at present dosage through cycle 6 and then plan vaginal brachytherapy

## 2022-11-17 ENCOUNTER — APPOINTMENT (OUTPATIENT)
Dept: LAB | Facility: HOSPITAL | Age: 67
End: 2022-11-17

## 2022-11-17 DIAGNOSIS — C55 UTERINE CARCINOSARCOMA (HCC): ICD-10-CM

## 2022-11-17 DIAGNOSIS — C54.1 MALIGNANT NEOPLASM OF ENDOMETRIUM (HCC): ICD-10-CM

## 2022-11-17 LAB
ALBUMIN SERPL BCP-MCNC: 3.6 G/DL (ref 3.5–5)
ALP SERPL-CCNC: 95 U/L (ref 46–116)
ALT SERPL W P-5'-P-CCNC: 26 U/L (ref 12–78)
ANION GAP SERPL CALCULATED.3IONS-SCNC: 5 MMOL/L (ref 4–13)
AST SERPL W P-5'-P-CCNC: 19 U/L (ref 5–45)
BASOPHILS # BLD AUTO: 0.04 THOUSANDS/ÂΜL (ref 0–0.1)
BASOPHILS NFR BLD AUTO: 1 % (ref 0–1)
BILIRUB SERPL-MCNC: 0.31 MG/DL (ref 0.2–1)
BUN SERPL-MCNC: 18 MG/DL (ref 5–25)
CALCIUM SERPL-MCNC: 9.1 MG/DL (ref 8.3–10.1)
CANCER AG125 SERPL-ACNC: 37.3 U/ML (ref 0–30)
CHLORIDE SERPL-SCNC: 107 MMOL/L (ref 96–108)
CO2 SERPL-SCNC: 26 MMOL/L (ref 21–32)
CREAT SERPL-MCNC: 0.6 MG/DL (ref 0.6–1.3)
EOSINOPHIL # BLD AUTO: 0.04 THOUSAND/ÂΜL (ref 0–0.61)
EOSINOPHIL NFR BLD AUTO: 1 % (ref 0–6)
ERYTHROCYTE [DISTWIDTH] IN BLOOD BY AUTOMATED COUNT: 16.7 % (ref 11.6–15.1)
GFR SERPL CREATININE-BSD FRML MDRD: 93 ML/MIN/1.73SQ M
GLUCOSE P FAST SERPL-MCNC: 102 MG/DL (ref 65–99)
HCT VFR BLD AUTO: 39.2 % (ref 34.8–46.1)
HGB BLD-MCNC: 12.3 G/DL (ref 11.5–15.4)
IMM GRANULOCYTES # BLD AUTO: 0.1 THOUSAND/UL (ref 0–0.2)
IMM GRANULOCYTES NFR BLD AUTO: 1 % (ref 0–2)
LYMPHOCYTES # BLD AUTO: 1.79 THOUSANDS/ÂΜL (ref 0.6–4.47)
LYMPHOCYTES NFR BLD AUTO: 21 % (ref 14–44)
MAGNESIUM SERPL-MCNC: 2.1 MG/DL (ref 1.6–2.6)
MCH RBC QN AUTO: 27.7 PG (ref 26.8–34.3)
MCHC RBC AUTO-ENTMCNC: 31.4 G/DL (ref 31.4–37.4)
MCV RBC AUTO: 88 FL (ref 82–98)
MONOCYTES # BLD AUTO: 0.72 THOUSAND/ÂΜL (ref 0.17–1.22)
MONOCYTES NFR BLD AUTO: 9 % (ref 4–12)
NEUTROPHILS # BLD AUTO: 5.78 THOUSANDS/ÂΜL (ref 1.85–7.62)
NEUTS SEG NFR BLD AUTO: 67 % (ref 43–75)
NRBC BLD AUTO-RTO: 0 /100 WBCS
PLATELET # BLD AUTO: 164 THOUSANDS/UL (ref 149–390)
PMV BLD AUTO: 9.3 FL (ref 8.9–12.7)
POTASSIUM SERPL-SCNC: 4.3 MMOL/L (ref 3.5–5.3)
PROT SERPL-MCNC: 6.9 G/DL (ref 6.4–8.4)
RBC # BLD AUTO: 4.44 MILLION/UL (ref 3.81–5.12)
SODIUM SERPL-SCNC: 138 MMOL/L (ref 135–147)
WBC # BLD AUTO: 8.47 THOUSAND/UL (ref 4.31–10.16)

## 2022-11-17 RX ORDER — PALONOSETRON 0.05 MG/ML
0.25 INJECTION, SOLUTION INTRAVENOUS ONCE
Status: CANCELLED | OUTPATIENT
Start: 2022-11-21

## 2022-11-17 RX ORDER — SODIUM CHLORIDE 9 MG/ML
20 INJECTION, SOLUTION INTRAVENOUS ONCE
Status: CANCELLED | OUTPATIENT
Start: 2022-11-21

## 2022-11-21 ENCOUNTER — HOSPITAL ENCOUNTER (OUTPATIENT)
Dept: INFUSION CENTER | Facility: CLINIC | Age: 67
Discharge: HOME | End: 2022-11-21

## 2022-11-21 VITALS
HEIGHT: 61 IN | WEIGHT: 129.4 LBS | HEART RATE: 75 BPM | RESPIRATION RATE: 16 BRPM | BODY MASS INDEX: 24.43 KG/M2 | TEMPERATURE: 98.4 F | SYSTOLIC BLOOD PRESSURE: 119 MMHG | DIASTOLIC BLOOD PRESSURE: 73 MMHG

## 2022-11-21 DIAGNOSIS — C55 UTERINE CARCINOSARCOMA (HCC): Primary | ICD-10-CM

## 2022-11-21 RX ORDER — PALONOSETRON 0.05 MG/ML
0.25 INJECTION, SOLUTION INTRAVENOUS ONCE
Status: COMPLETED | OUTPATIENT
Start: 2022-11-21 | End: 2022-11-21

## 2022-11-21 RX ORDER — SODIUM CHLORIDE 9 MG/ML
20 INJECTION, SOLUTION INTRAVENOUS ONCE
Status: COMPLETED | OUTPATIENT
Start: 2022-11-21 | End: 2022-11-21

## 2022-11-21 RX ADMIN — DIPHENHYDRAMINE HYDROCHLORIDE 25 MG: 50 INJECTION, SOLUTION INTRAMUSCULAR; INTRAVENOUS at 09:29

## 2022-11-21 RX ADMIN — PALONOSETRON 0.25 MG: 0.05 INJECTION, SOLUTION INTRAVENOUS at 10:50

## 2022-11-21 RX ADMIN — CARBOPLATIN 575.4 MG: 10 INJECTION, SOLUTION INTRAVENOUS at 13:58

## 2022-11-21 RX ADMIN — SODIUM CHLORIDE 20 ML/HR: 0.9 INJECTION, SOLUTION INTRAVENOUS at 09:05

## 2022-11-21 RX ADMIN — FOSAPREPITANT 150 MG: 150 INJECTION, POWDER, LYOPHILIZED, FOR SOLUTION INTRAVENOUS at 10:15

## 2022-11-21 RX ADMIN — DEXAMETHASONE SODIUM PHOSPHATE 20 MG: 10 INJECTION, SOLUTION INTRAMUSCULAR; INTRAVENOUS at 09:05

## 2022-11-21 RX ADMIN — PACLITAXEL 276.6 MG: 6 INJECTION, SOLUTION, CONCENTRATE INTRAVENOUS at 10:50

## 2022-11-21 RX ADMIN — FAMOTIDINE 20 MG: 10 INJECTION, SOLUTION INTRAVENOUS at 09:52

## 2022-11-21 NOTE — PROGRESS NOTES
Pt to clinic for Hoa and taxol  Pt offers no complaints today  Tolerated infusions without complications  Pt aware of next appointment  Pt port accessed, flushed, and de-accessed with positive blood returned  AVS was received by pt  Pt ambulated out of clinic safely

## 2022-11-22 ENCOUNTER — HOSPITAL ENCOUNTER (OUTPATIENT)
Dept: INFUSION CENTER | Facility: CLINIC | Age: 67
Discharge: HOME | End: 2022-11-22

## 2022-11-22 DIAGNOSIS — C55 UTERINE CARCINOSARCOMA (HCC): Primary | ICD-10-CM

## 2022-11-22 RX ADMIN — PEGFILGRASTIM 6 MG: 6 INJECTION SUBCUTANEOUS at 14:30

## 2022-11-22 NOTE — PROGRESS NOTES
Neulasta injection tolerated well without complications  No complaints offered  AVS declined  Left unit in stable condition

## 2022-11-22 NOTE — PLAN OF CARE
Problem: INFECTION - ADULT  Goal: Absence of fever/infection during neutropenic period  Description: INTERVENTIONS:  - Monitor WBC/ANC  - Administer neulasta as ordered    Outcome: Progressing

## 2022-11-23 ENCOUNTER — APPOINTMENT (OUTPATIENT)
Dept: LAB | Facility: HOSPITAL | Age: 67
End: 2022-11-23

## 2022-11-23 DIAGNOSIS — C55 UTERINE CARCINOSARCOMA (HCC): ICD-10-CM

## 2022-11-23 LAB
ALBUMIN SERPL BCP-MCNC: 3.3 G/DL (ref 3.5–5)
ALP SERPL-CCNC: 77 U/L (ref 46–116)
ALT SERPL W P-5'-P-CCNC: 20 U/L (ref 12–78)
ANION GAP SERPL CALCULATED.3IONS-SCNC: 6 MMOL/L (ref 4–13)
AST SERPL W P-5'-P-CCNC: 19 U/L (ref 5–45)
BASOPHILS # BLD MANUAL: 0 THOUSAND/UL (ref 0–0.1)
BASOPHILS NFR MAR MANUAL: 0 % (ref 0–1)
BILIRUB SERPL-MCNC: 0.87 MG/DL (ref 0.2–1)
BUN SERPL-MCNC: 21 MG/DL (ref 5–25)
CALCIUM ALBUM COR SERPL-MCNC: 9.1 MG/DL (ref 8.3–10.1)
CALCIUM SERPL-MCNC: 8.5 MG/DL (ref 8.3–10.1)
CHLORIDE SERPL-SCNC: 110 MMOL/L (ref 96–108)
CO2 SERPL-SCNC: 26 MMOL/L (ref 21–32)
CREAT SERPL-MCNC: 0.56 MG/DL (ref 0.6–1.3)
EOSINOPHIL # BLD MANUAL: 0 THOUSAND/UL (ref 0–0.4)
EOSINOPHIL NFR BLD MANUAL: 0 % (ref 0–6)
ERYTHROCYTE [DISTWIDTH] IN BLOOD BY AUTOMATED COUNT: 17.8 % (ref 11.6–15.1)
GFR SERPL CREATININE-BSD FRML MDRD: 95 ML/MIN/1.73SQ M
GLUCOSE P FAST SERPL-MCNC: 89 MG/DL (ref 65–99)
HCT VFR BLD AUTO: 33 % (ref 34.8–46.1)
HGB BLD-MCNC: 10.6 G/DL (ref 11.5–15.4)
LYMPHOCYTES # BLD AUTO: 3.08 THOUSAND/UL (ref 0.6–4.47)
LYMPHOCYTES # BLD AUTO: 6 % (ref 14–44)
MAGNESIUM SERPL-MCNC: 2.1 MG/DL (ref 1.6–2.6)
MCH RBC QN AUTO: 28.2 PG (ref 26.8–34.3)
MCHC RBC AUTO-ENTMCNC: 32.1 G/DL (ref 31.4–37.4)
MCV RBC AUTO: 88 FL (ref 82–98)
MONOCYTES # BLD AUTO: 0.51 THOUSAND/UL (ref 0–1.22)
MONOCYTES NFR BLD: 1 % (ref 4–12)
NEUTROPHILS # BLD MANUAL: 47.75 THOUSAND/UL (ref 1.85–7.62)
NEUTS BAND NFR BLD MANUAL: 7 % (ref 0–8)
NEUTS SEG NFR BLD AUTO: 86 % (ref 43–75)
PLATELET # BLD AUTO: 129 THOUSANDS/UL (ref 149–390)
PLATELET BLD QL SMEAR: ABNORMAL
PMV BLD AUTO: 9.9 FL (ref 8.9–12.7)
POTASSIUM SERPL-SCNC: 3.4 MMOL/L (ref 3.5–5.3)
PROT SERPL-MCNC: 6.3 G/DL (ref 6.4–8.4)
RBC # BLD AUTO: 3.76 MILLION/UL (ref 3.81–5.12)
SODIUM SERPL-SCNC: 142 MMOL/L (ref 135–147)
WBC # BLD AUTO: 51.34 THOUSAND/UL (ref 4.31–10.16)

## 2022-12-01 ENCOUNTER — APPOINTMENT (OUTPATIENT)
Dept: LAB | Facility: HOSPITAL | Age: 67
End: 2022-12-01

## 2022-12-01 DIAGNOSIS — C55 UTERINE CARCINOSARCOMA (HCC): ICD-10-CM

## 2022-12-01 DIAGNOSIS — C54.1 MALIGNANT NEOPLASM OF ENDOMETRIUM (HCC): ICD-10-CM

## 2022-12-01 LAB
ALBUMIN SERPL BCP-MCNC: 3.5 G/DL (ref 3.5–5)
ALP SERPL-CCNC: 109 U/L (ref 46–116)
ALT SERPL W P-5'-P-CCNC: 26 U/L (ref 12–78)
ANION GAP SERPL CALCULATED.3IONS-SCNC: 6 MMOL/L (ref 4–13)
AST SERPL W P-5'-P-CCNC: 19 U/L (ref 5–45)
BASOPHILS # BLD AUTO: 0.04 THOUSANDS/ÂΜL (ref 0–0.1)
BASOPHILS NFR BLD AUTO: 0 % (ref 0–1)
BILIRUB SERPL-MCNC: 0.19 MG/DL (ref 0.2–1)
BUN SERPL-MCNC: 11 MG/DL (ref 5–25)
CALCIUM SERPL-MCNC: 8.9 MG/DL (ref 8.3–10.1)
CHLORIDE SERPL-SCNC: 110 MMOL/L (ref 96–108)
CO2 SERPL-SCNC: 25 MMOL/L (ref 21–32)
CREAT SERPL-MCNC: 0.51 MG/DL (ref 0.6–1.3)
EOSINOPHIL # BLD AUTO: 0.12 THOUSAND/ÂΜL (ref 0–0.61)
EOSINOPHIL NFR BLD AUTO: 1 % (ref 0–6)
ERYTHROCYTE [DISTWIDTH] IN BLOOD BY AUTOMATED COUNT: 18.3 % (ref 11.6–15.1)
GFR SERPL CREATININE-BSD FRML MDRD: 98 ML/MIN/1.73SQ M
GLUCOSE P FAST SERPL-MCNC: 90 MG/DL (ref 65–99)
HCT VFR BLD AUTO: 32.9 % (ref 34.8–46.1)
HGB BLD-MCNC: 10.1 G/DL (ref 11.5–15.4)
IMM GRANULOCYTES # BLD AUTO: 0.29 THOUSAND/UL (ref 0–0.2)
IMM GRANULOCYTES NFR BLD AUTO: 2 % (ref 0–2)
LYMPHOCYTES # BLD AUTO: 1.84 THOUSANDS/ÂΜL (ref 0.6–4.47)
LYMPHOCYTES NFR BLD AUTO: 14 % (ref 14–44)
MAGNESIUM SERPL-MCNC: 2.1 MG/DL (ref 1.6–2.6)
MCH RBC QN AUTO: 28.2 PG (ref 26.8–34.3)
MCHC RBC AUTO-ENTMCNC: 30.7 G/DL (ref 31.4–37.4)
MCV RBC AUTO: 92 FL (ref 82–98)
MONOCYTES # BLD AUTO: 1.04 THOUSAND/ÂΜL (ref 0.17–1.22)
MONOCYTES NFR BLD AUTO: 8 % (ref 4–12)
NEUTROPHILS # BLD AUTO: 9.47 THOUSANDS/ÂΜL (ref 1.85–7.62)
NEUTS SEG NFR BLD AUTO: 75 % (ref 43–75)
NRBC BLD AUTO-RTO: 0 /100 WBCS
PLATELET # BLD AUTO: 151 THOUSANDS/UL (ref 149–390)
PMV BLD AUTO: 9.3 FL (ref 8.9–12.7)
POTASSIUM SERPL-SCNC: 3.8 MMOL/L (ref 3.5–5.3)
PROT SERPL-MCNC: 6.4 G/DL (ref 6.4–8.4)
RBC # BLD AUTO: 3.58 MILLION/UL (ref 3.81–5.12)
SODIUM SERPL-SCNC: 141 MMOL/L (ref 135–147)
WBC # BLD AUTO: 12.8 THOUSAND/UL (ref 4.31–10.16)

## 2022-12-07 ENCOUNTER — OFFICE VISIT (OUTPATIENT)
Dept: GYNECOLOGIC ONCOLOGY | Facility: CLINIC | Age: 67
End: 2022-12-07

## 2022-12-07 VITALS
OXYGEN SATURATION: 97 % | HEART RATE: 117 BPM | TEMPERATURE: 97.3 F | WEIGHT: 126.9 LBS | RESPIRATION RATE: 18 BRPM | HEIGHT: 61 IN | BODY MASS INDEX: 23.96 KG/M2 | DIASTOLIC BLOOD PRESSURE: 66 MMHG | SYSTOLIC BLOOD PRESSURE: 118 MMHG

## 2022-12-07 DIAGNOSIS — C54.1 SARCOMA OF ENDOMETRIUM (HCC): ICD-10-CM

## 2022-12-07 DIAGNOSIS — C55 UTERINE CARCINOSARCOMA (HCC): Primary | ICD-10-CM

## 2022-12-07 NOTE — ASSESSMENT & PLAN NOTE
Patient is a very pleasant 64-year-old female history of stage Ia carcinosarcoma of the endometrium  She is undergone surgery followed by 4 cycles of carboplatin and paclitaxel therapy  She also has Neulasta support  She presents today in consideration of cycle 5  Her blood work is stable for continued treatment  Her  continues to drop down to 30  We will plan cycle 5 as anticipated followed by cycle 6 and then a CAT scan  If all looks good we will consider vaginal brachytherapy at that time

## 2022-12-07 NOTE — PROGRESS NOTES
Assessment/Plan:    Problem List Items Addressed This Visit        Genitourinary    Uterine carcinosarcoma [facility]) - Primary     Patient is a very pleasant 20-year-old female history of stage Ia carcinosarcoma of the endometrium  She is undergone surgery followed by 4 cycles of carboplatin and paclitaxel therapy  She also has Neulasta support  She presents today in consideration of cycle 5  Her blood work is stable for continued treatment  Her  continues to drop down to 30  We will plan cycle 5 as anticipated followed by cycle 6 and then a CAT scan  If all looks good we will consider vaginal brachytherapy at that time  Due to carcinosarcoma subtype and elevated  the PET scan will be performed after completion of chemotherapy  This will be scheduled today  Consideration of cycle 5 carboplatin and paclitaxel for stage Ia  CHIEF COMPLAINT: Carcinosarcoma of the uterus      Problem:  Cancer Staging   Uterine carcinosarcoma [facility])  Staging form: Corpus Uteri - Carcinoma, AJCC 8th Edition  - Clinical stage from 8/26/2022: FIGO Stage IA (cT1a, cN0, cM0) - Signed by Arlene Noguera MD on 8/26/2022        Previous therapy:  Oncology History   Uterine carcinosarcoma ([facility] [address] )   8/18/2022 Initial Diagnosis    Uterine carcinosarcoma ([facility] [address] )     8/26/2022 -  Cancer Staged    Staging form: Corpus Uteri - Carcinoma, AJCC 8th Edition  - Clinical stage from 8/26/2022: FIGO Stage IA (cT1a, cN0, cM0) - Signed by Arlene Noguera MD on 8/26/2022  Histopathologic type: Carcinosarcoma  Stage prefix: Initial diagnosis  Histologic grade (G): G3  Histologic grading system: 3 grade system       8/2022 Surgery    Patient and did went robotic hysterectomy bilateral salpingo-oophorectomy sentinel lymph node mapping and biopsy for stage I A non invasive carcinosarcoma of the endometrium  The uterus did fracture within the vagina but no leakage into the abdomen was noted       9/19/2022 -  Chemotherapy    palonosetron (ALOXI), 0 25 mg, Intravenous, Once, 4 of 6 cycles  Administration: 0 25 mg (9/19/2022), 0 25 mg (10/10/2022), 0 25 mg (10/31/2022), 0 25 mg (11/21/2022)  pegfilgrastim (NEULASTA), 6 mg, Subcutaneous, Once, 3 of 5 cycles  Administration: 6 mg (10/11/2022), 6 mg (11/1/2022), 6 mg (11/22/2022)  fosaprepitant (EMEND) IVPB, 150 mg, Intravenous, Once, 4 of 6 cycles  Administration: 150 mg (9/19/2022), 150 mg (10/10/2022), 150 mg (10/31/2022), 150 mg (11/21/2022)  CARBOplatin (PARAPLATIN) IVPB (GOG AUC DOSING), 572 4 mg, Intravenous, Once, 4 of 6 cycles  Administration: 572 4 mg (9/19/2022), 579 6 mg (10/10/2022), 579 6 mg (10/31/2022), 575 4 mg (11/21/2022)  PACLItaxel (TAXOL) chemo IVPB, 175 mg/m2 = 274 8 mg, Intravenous, Once, 4 of 6 cycles  Administration: 274 8 mg (9/19/2022), 278 4 mg (10/10/2022), 278 4 mg (10/31/2022), 276 6 mg (11/21/2022)           Patient ID: Lena Alvares is a 69 y o  female  Patient is a very pleasant 77-year-old female with history of stage Ia carcinosarcoma of the endometrium  She underwent robotically assisted total laparoscopic hysterectomy bilateral salpingo-oophorectomy sentinel lymph node mapping and biopsy followed by 4 cycles of carboplatin and paclitaxel with Neulasta support  She is tolerating her treatment well  Her  has been coming down  I have reviewed her weekly CBC CT  and  data  There will stable for present treatment at present dosage  Her white blood cell count does respond rapidly to her Neulasta support  Today, the patient is doing well  She denies significant abdominal pain, pelvic pain, nausea, vomiting, constipation, diarrhea, fevers, chills, or vaginal bleeding  The following portions of the patient's history were reviewed and updated as appropriate: allergies, current medications, past family history, past medical history, past surgical history and problem list right  Review of Systems   Constitutional: Negative  HENT: Negative  Eyes: Negative  Respiratory: Negative  Cardiovascular: Negative  Gastrointestinal: Negative  Endocrine: Negative  Genitourinary: Negative  Musculoskeletal: Negative  Skin: Negative  Neurological: Negative  Hematological: Negative  Psychiatric/Behavioral: Negative  Current Outpatient Medications   Medication Sig Dispense Refill   • cholecalciferol (VITAMIN D3) 1,000 units tablet Take 1,000 Units by mouth daily     • cyanocobalamin (VITAMIN B-12) 100 MCG tablet Take 100 mcg by mouth daily     • escitalopram (LEXAPRO) 10 mg tablet Take 1 tablet (10 mg total) by mouth daily 90 tablet 3   • loratadine-pseudoephedrine (CLARITIN-D 24-HOUR)  mg per 24 hr tablet Take 1 tablet by mouth daily     • LORazepam (ATIVAN) 1 mg tablet Take 1 tablet (1 mg total) by mouth every 6 (six) hours as needed for anxiety (or nausea) 36 tablet 1   • Multiple Vitamins-Minerals (multivitamin with minerals) tablet Take 1 tablet by mouth daily     • Multiple Vitamins-Minerals (OCUVITE PO) Take by mouth     • Multiple Vitamins-Minerals (OCUVITE PO) Take by mouth in the morning     • naloxone (NARCAN) 4 mg/0 1 mL nasal spray Administer 1 spray into a nostril  If no response after 2-3 minutes, give another dose in the other nostril using a new spray  1 each 1   • ondansetron (ZOFRAN) 8 mg tablet TAKE 1 TABLET (8 MG TOTAL) BY MOUTH EVERY 8 (EIGHT) HOURS AS NEEDED FOR NAUSEA OR VOMITING 30 tablet 1   • rosuvastatin (CRESTOR) 5 mg tablet Take 1 tablet (5 mg total) by mouth daily 90 tablet 3     No current facility-administered medications for this visit  Objective:    Blood pressure 118/66, pulse (!) 117, temperature (!) 97 3 °F (36 3 °C), temperature source Temporal, resp  rate 18, height 5' 1 3" (1 557 m), weight 57 6 kg (126 lb 14 4 oz), SpO2 97 %, not currently breastfeeding  Body mass index is 23 74 kg/m²  Body surface area is 1 56 meters squared      Physical Exam  Constitutional: Appearance: She is well-developed  HENT:      Head: Normocephalic and atraumatic  Neck:      Thyroid: No thyromegaly  Cardiovascular:      Rate and Rhythm: Normal rate and regular rhythm  Heart sounds: Normal heart sounds  Pulmonary:      Effort: Pulmonary effort is normal       Breath sounds: Normal breath sounds  Abdominal:      General: Bowel sounds are normal       Palpations: Abdomen is soft  Comments: Well healed laparoscopic incisions  Genitourinary:     Comments: deferred    Musculoskeletal:         General: Normal range of motion  Cervical back: Normal range of motion and neck supple  Lymphadenopathy:      Cervical: No cervical adenopathy  Skin:     General: Skin is warm and dry  Neurological:      Mental Status: She is alert and oriented to person, place, and time     Psychiatric:         Behavior: Behavior normal          Lab Results   Component Value Date     37 3 (H) 11/17/2022     Lab Results   Component Value Date    K 3 8 12/01/2022     (H) 12/01/2022    CO2 25 12/01/2022    BUN 11 12/01/2022    CREATININE 0 51 (L) 12/01/2022    GLUF 90 12/01/2022    CALCIUM 8 9 12/01/2022    CORRECTEDCA 9 1 11/23/2022    AST 19 12/01/2022    ALT 26 12/01/2022    ALKPHOS 109 12/01/2022    EGFR 98 12/01/2022     Lab Results   Component Value Date    WBC 12 80 (H) 12/01/2022    HGB 10 1 (L) 12/01/2022    HCT 32 9 (L) 12/01/2022    MCV 92 12/01/2022     12/01/2022     Lab Results   Component Value Date    NEUTROABS 9 47 (H) 12/01/2022        Trend:  Lab Results   Component Value Date     37 3 (H) 11/17/2022     45 5 (H) 10/27/2022     67 6 (H) 09/29/2022     85 4 (H) 09/22/2022     84 9 (H) 09/16/2022

## 2022-12-08 ENCOUNTER — APPOINTMENT (OUTPATIENT)
Dept: LAB | Facility: HOSPITAL | Age: 67
End: 2022-12-08

## 2022-12-08 DIAGNOSIS — C54.1 MALIGNANT NEOPLASM OF ENDOMETRIUM (HCC): ICD-10-CM

## 2022-12-08 DIAGNOSIS — C55 UTERINE CARCINOSARCOMA (HCC): ICD-10-CM

## 2022-12-08 LAB
ALBUMIN SERPL BCP-MCNC: 4 G/DL (ref 3.5–5)
ALP SERPL-CCNC: 98 U/L (ref 46–116)
ALT SERPL W P-5'-P-CCNC: 25 U/L (ref 12–78)
ANION GAP SERPL CALCULATED.3IONS-SCNC: 7 MMOL/L (ref 4–13)
AST SERPL W P-5'-P-CCNC: 19 U/L (ref 5–45)
BASOPHILS # BLD AUTO: 0.03 THOUSANDS/ÂΜL (ref 0–0.1)
BASOPHILS NFR BLD AUTO: 1 % (ref 0–1)
BILIRUB SERPL-MCNC: 0.48 MG/DL (ref 0.2–1)
BUN SERPL-MCNC: 17 MG/DL (ref 5–25)
CALCIUM SERPL-MCNC: 8.7 MG/DL (ref 8.3–10.1)
CANCER AG125 SERPL-ACNC: 13.1 U/ML (ref 0–30)
CHLORIDE SERPL-SCNC: 109 MMOL/L (ref 96–108)
CO2 SERPL-SCNC: 24 MMOL/L (ref 21–32)
CREAT SERPL-MCNC: 0.53 MG/DL (ref 0.6–1.3)
EOSINOPHIL # BLD AUTO: 0.06 THOUSAND/ÂΜL (ref 0–0.61)
EOSINOPHIL NFR BLD AUTO: 1 % (ref 0–6)
ERYTHROCYTE [DISTWIDTH] IN BLOOD BY AUTOMATED COUNT: 18.9 % (ref 11.6–15.1)
GFR SERPL CREATININE-BSD FRML MDRD: 97 ML/MIN/1.73SQ M
GLUCOSE P FAST SERPL-MCNC: 86 MG/DL (ref 65–99)
HCT VFR BLD AUTO: 35.7 % (ref 34.8–46.1)
HGB BLD-MCNC: 10.8 G/DL (ref 11.5–15.4)
IMM GRANULOCYTES # BLD AUTO: 0.03 THOUSAND/UL (ref 0–0.2)
IMM GRANULOCYTES NFR BLD AUTO: 1 % (ref 0–2)
LYMPHOCYTES # BLD AUTO: 1.21 THOUSANDS/ÂΜL (ref 0.6–4.47)
LYMPHOCYTES NFR BLD AUTO: 27 % (ref 14–44)
MAGNESIUM SERPL-MCNC: 2.1 MG/DL (ref 1.6–2.6)
MCH RBC QN AUTO: 28 PG (ref 26.8–34.3)
MCHC RBC AUTO-ENTMCNC: 30.3 G/DL (ref 31.4–37.4)
MCV RBC AUTO: 93 FL (ref 82–98)
MONOCYTES # BLD AUTO: 0.36 THOUSAND/ÂΜL (ref 0.17–1.22)
MONOCYTES NFR BLD AUTO: 8 % (ref 4–12)
NEUTROPHILS # BLD AUTO: 2.72 THOUSANDS/ÂΜL (ref 1.85–7.62)
NEUTS SEG NFR BLD AUTO: 62 % (ref 43–75)
NRBC BLD AUTO-RTO: 0 /100 WBCS
PLATELET # BLD AUTO: 298 THOUSANDS/UL (ref 149–390)
PMV BLD AUTO: 9.1 FL (ref 8.9–12.7)
POTASSIUM SERPL-SCNC: 3.8 MMOL/L (ref 3.5–5.3)
PROT SERPL-MCNC: 6.6 G/DL (ref 6.4–8.4)
RBC # BLD AUTO: 3.86 MILLION/UL (ref 3.81–5.12)
SODIUM SERPL-SCNC: 140 MMOL/L (ref 135–147)
WBC # BLD AUTO: 4.41 THOUSAND/UL (ref 4.31–10.16)

## 2022-12-09 RX ORDER — SODIUM CHLORIDE 9 MG/ML
20 INJECTION, SOLUTION INTRAVENOUS ONCE
Status: CANCELLED | OUTPATIENT
Start: 2022-12-12

## 2022-12-09 RX ORDER — PALONOSETRON 0.05 MG/ML
0.25 INJECTION, SOLUTION INTRAVENOUS ONCE
Status: CANCELLED | OUTPATIENT
Start: 2022-12-12

## 2022-12-12 ENCOUNTER — HOSPITAL ENCOUNTER (OUTPATIENT)
Dept: INFUSION CENTER | Facility: CLINIC | Age: 67
Discharge: HOME | End: 2022-12-12

## 2022-12-12 VITALS
RESPIRATION RATE: 16 BRPM | BODY MASS INDEX: 24.58 KG/M2 | TEMPERATURE: 98.2 F | WEIGHT: 130.2 LBS | HEART RATE: 85 BPM | DIASTOLIC BLOOD PRESSURE: 64 MMHG | HEIGHT: 61 IN | SYSTOLIC BLOOD PRESSURE: 122 MMHG

## 2022-12-12 DIAGNOSIS — C55 UTERINE CARCINOSARCOMA (HCC): Primary | ICD-10-CM

## 2022-12-12 RX ORDER — PALONOSETRON 0.05 MG/ML
0.25 INJECTION, SOLUTION INTRAVENOUS ONCE
Status: COMPLETED | OUTPATIENT
Start: 2022-12-12 | End: 2022-12-12

## 2022-12-12 RX ORDER — SODIUM CHLORIDE 9 MG/ML
20 INJECTION, SOLUTION INTRAVENOUS ONCE
Status: COMPLETED | OUTPATIENT
Start: 2022-12-12 | End: 2022-12-12

## 2022-12-12 RX ADMIN — FOSAPREPITANT 150 MG: 150 INJECTION, POWDER, LYOPHILIZED, FOR SOLUTION INTRAVENOUS at 10:03

## 2022-12-12 RX ADMIN — DIPHENHYDRAMINE HYDROCHLORIDE 25 MG: 50 INJECTION, SOLUTION INTRAMUSCULAR; INTRAVENOUS at 09:09

## 2022-12-12 RX ADMIN — PALONOSETRON 0.25 MG: 0.05 INJECTION, SOLUTION INTRAVENOUS at 10:42

## 2022-12-12 RX ADMIN — DEXAMETHASONE SODIUM PHOSPHATE 20 MG: 10 INJECTION, SOLUTION INTRAMUSCULAR; INTRAVENOUS at 08:50

## 2022-12-12 RX ADMIN — FAMOTIDINE 20 MG: 10 INJECTION, SOLUTION INTRAVENOUS at 09:40

## 2022-12-12 RX ADMIN — SODIUM CHLORIDE 20 ML/HR: 0.9 INJECTION, SOLUTION INTRAVENOUS at 08:50

## 2022-12-12 RX ADMIN — CARBOPLATIN 564 MG: 10 INJECTION, SOLUTION INTRAVENOUS at 13:50

## 2022-12-12 RX ADMIN — PACLITAXEL 273 MG: 6 INJECTION, SOLUTION, CONCENTRATE INTRAVENOUS at 10:42

## 2022-12-12 NOTE — PROGRESS NOTES
Pt here for chemotherapy, taxol/carbo, offering no complaints  Right port accessed with positive blood return noted throughout treatment  Tolerated infusion without incident  Port flushed and de-accessed  AVS given  Aware of time to come tomorrow for neulasta injection    Walked out in stable condition

## 2022-12-13 ENCOUNTER — HOSPITAL ENCOUNTER (OUTPATIENT)
Dept: INFUSION CENTER | Facility: CLINIC | Age: 67
Discharge: HOME | End: 2022-12-13

## 2022-12-13 DIAGNOSIS — C55 UTERINE CARCINOSARCOMA (HCC): Primary | ICD-10-CM

## 2022-12-13 RX ADMIN — PEGFILGRASTIM 6 MG: 6 INJECTION SUBCUTANEOUS at 15:12

## 2022-12-13 NOTE — PROGRESS NOTES
Neulasta injection in left arm tolerated well without complications  No complaints offered  AVS declined  Left unit in stable condition

## 2022-12-13 NOTE — PLAN OF CARE
Problem: INFECTION - ADULT  Goal: Absence or prevention of progression during chemo treatments  Description: INTERVENTIONS:  - Assess and monitor for signs and symptoms of infection  - Monitor lab/diagnostic results  - Monitor all insertion sites  - Administer Neulasta as ordered  - Instruct and encourage patient and family to use good hand hygiene technique  Outcome: Progressing

## 2022-12-15 ENCOUNTER — APPOINTMENT (OUTPATIENT)
Dept: LAB | Facility: HOSPITAL | Age: 67
End: 2022-12-15

## 2022-12-15 DIAGNOSIS — C55 UTERINE CARCINOSARCOMA (HCC): ICD-10-CM

## 2022-12-15 LAB
ALBUMIN SERPL BCP-MCNC: 3.7 G/DL (ref 3.5–5)
ALP SERPL-CCNC: 89 U/L (ref 46–116)
ALT SERPL W P-5'-P-CCNC: 20 U/L (ref 12–78)
ANION GAP SERPL CALCULATED.3IONS-SCNC: 5 MMOL/L (ref 4–13)
ANISOCYTOSIS BLD QL SMEAR: PRESENT
AST SERPL W P-5'-P-CCNC: 17 U/L (ref 5–45)
BASOPHILS # BLD MANUAL: 0 THOUSAND/UL (ref 0–0.1)
BASOPHILS NFR MAR MANUAL: 0 % (ref 0–1)
BILIRUB SERPL-MCNC: 1.08 MG/DL (ref 0.2–1)
BUN SERPL-MCNC: 14 MG/DL (ref 5–25)
CALCIUM SERPL-MCNC: 8.7 MG/DL (ref 8.3–10.1)
CHLORIDE SERPL-SCNC: 108 MMOL/L (ref 96–108)
CO2 SERPL-SCNC: 28 MMOL/L (ref 21–32)
CREAT SERPL-MCNC: 0.53 MG/DL (ref 0.6–1.3)
EOSINOPHIL # BLD MANUAL: 0 THOUSAND/UL (ref 0–0.4)
EOSINOPHIL NFR BLD MANUAL: 0 % (ref 0–6)
ERYTHROCYTE [DISTWIDTH] IN BLOOD BY AUTOMATED COUNT: 19.5 % (ref 11.6–15.1)
GFR SERPL CREATININE-BSD FRML MDRD: 97 ML/MIN/1.73SQ M
GLUCOSE P FAST SERPL-MCNC: 97 MG/DL (ref 65–99)
HCT VFR BLD AUTO: 33.3 % (ref 34.8–46.1)
HGB BLD-MCNC: 10.3 G/DL (ref 11.5–15.4)
LYMPHOCYTES # BLD AUTO: 2.02 THOUSAND/UL (ref 0.6–4.47)
LYMPHOCYTES # BLD AUTO: 7 % (ref 14–44)
MAGNESIUM SERPL-MCNC: 1.8 MG/DL (ref 1.6–2.6)
MCH RBC QN AUTO: 29.2 PG (ref 26.8–34.3)
MCHC RBC AUTO-ENTMCNC: 30.9 G/DL (ref 31.4–37.4)
MCV RBC AUTO: 94 FL (ref 82–98)
MONOCYTES # BLD AUTO: 0.29 THOUSAND/UL (ref 0–1.22)
MONOCYTES NFR BLD: 1 % (ref 4–12)
NEUTROPHILS # BLD MANUAL: 26.5 THOUSAND/UL (ref 1.85–7.62)
NEUTS BAND NFR BLD MANUAL: 1 % (ref 0–8)
NEUTS SEG NFR BLD AUTO: 91 % (ref 43–75)
PLATELET # BLD AUTO: 261 THOUSANDS/UL (ref 149–390)
PLATELET BLD QL SMEAR: ADEQUATE
PMV BLD AUTO: 9 FL (ref 8.9–12.7)
POIKILOCYTOSIS BLD QL SMEAR: PRESENT
POTASSIUM SERPL-SCNC: 3.9 MMOL/L (ref 3.5–5.3)
PROT SERPL-MCNC: 6.1 G/DL (ref 6.4–8.4)
RBC # BLD AUTO: 3.53 MILLION/UL (ref 3.81–5.12)
SODIUM SERPL-SCNC: 141 MMOL/L (ref 135–147)
WBC # BLD AUTO: 28.8 THOUSAND/UL (ref 4.31–10.16)

## 2022-12-20 ENCOUNTER — HOSPITAL ENCOUNTER (OUTPATIENT)
Dept: RADIOLOGY | Age: 67
Discharge: HOME | End: 2022-12-20

## 2022-12-20 DIAGNOSIS — C55 UTERINE CARCINOSARCOMA (HCC): ICD-10-CM

## 2022-12-20 DIAGNOSIS — C54.1 SARCOMA OF ENDOMETRIUM (HCC): ICD-10-CM

## 2022-12-20 LAB — GLUCOSE SERPL-MCNC: 87 MG/DL (ref 65–140)

## 2022-12-22 ENCOUNTER — APPOINTMENT (OUTPATIENT)
Dept: LAB | Facility: HOSPITAL | Age: 67
End: 2022-12-22

## 2022-12-22 DIAGNOSIS — C55 UTERINE CARCINOSARCOMA (HCC): ICD-10-CM

## 2022-12-22 DIAGNOSIS — C54.1 MALIGNANT NEOPLASM OF ENDOMETRIUM (HCC): ICD-10-CM

## 2022-12-22 LAB
ALBUMIN SERPL BCP-MCNC: 3.5 G/DL (ref 3.5–5)
ALP SERPL-CCNC: 108 U/L (ref 46–116)
ALT SERPL W P-5'-P-CCNC: 20 U/L (ref 12–78)
ANION GAP SERPL CALCULATED.3IONS-SCNC: 5 MMOL/L (ref 4–13)
AST SERPL W P-5'-P-CCNC: 19 U/L (ref 5–45)
BASOPHILS # BLD MANUAL: 0 THOUSAND/UL (ref 0–0.1)
BASOPHILS NFR MAR MANUAL: 0 % (ref 0–1)
BILIRUB SERPL-MCNC: 0.29 MG/DL (ref 0.2–1)
BUN SERPL-MCNC: 18 MG/DL (ref 5–25)
CALCIUM SERPL-MCNC: 8.8 MG/DL (ref 8.3–10.1)
CHLORIDE SERPL-SCNC: 113 MMOL/L (ref 96–108)
CO2 SERPL-SCNC: 26 MMOL/L (ref 21–32)
CREAT SERPL-MCNC: 0.5 MG/DL (ref 0.6–1.3)
EOSINOPHIL # BLD MANUAL: 0 THOUSAND/UL (ref 0–0.4)
EOSINOPHIL NFR BLD MANUAL: 0 % (ref 0–6)
ERYTHROCYTE [DISTWIDTH] IN BLOOD BY AUTOMATED COUNT: 18.7 % (ref 11.6–15.1)
GFR SERPL CREATININE-BSD FRML MDRD: 99 ML/MIN/1.73SQ M
GLUCOSE P FAST SERPL-MCNC: 89 MG/DL (ref 65–99)
HCT VFR BLD AUTO: 30.8 % (ref 34.8–46.1)
HGB BLD-MCNC: 9.2 G/DL (ref 11.5–15.4)
LYMPHOCYTES # BLD AUTO: 12 % (ref 14–44)
LYMPHOCYTES # BLD AUTO: 2.59 THOUSAND/UL (ref 0.6–4.47)
MAGNESIUM SERPL-MCNC: 1.9 MG/DL (ref 1.6–2.6)
MCH RBC QN AUTO: 29 PG (ref 26.8–34.3)
MCHC RBC AUTO-ENTMCNC: 29.9 G/DL (ref 31.4–37.4)
MCV RBC AUTO: 97 FL (ref 82–98)
METAMYELOCYTES NFR BLD MANUAL: 4 % (ref 0–1)
MONOCYTES # BLD AUTO: 0.86 THOUSAND/UL (ref 0–1.22)
MONOCYTES NFR BLD: 4 % (ref 4–12)
MYELOCYTES NFR BLD MANUAL: 2 % (ref 0–1)
NEUTROPHILS # BLD MANUAL: 16.83 THOUSAND/UL (ref 1.85–7.62)
NEUTS BAND NFR BLD MANUAL: 6 % (ref 0–8)
NEUTS SEG NFR BLD AUTO: 72 % (ref 43–75)
PLATELET # BLD AUTO: 250 THOUSANDS/UL (ref 149–390)
PLATELET BLD QL SMEAR: ADEQUATE
PMV BLD AUTO: 8.6 FL (ref 8.9–12.7)
POLYCHROMASIA BLD QL SMEAR: PRESENT
POTASSIUM SERPL-SCNC: 4.1 MMOL/L (ref 3.5–5.3)
PROT SERPL-MCNC: 6.1 G/DL (ref 6.4–8.4)
RBC # BLD AUTO: 3.17 MILLION/UL (ref 3.81–5.12)
SODIUM SERPL-SCNC: 144 MMOL/L (ref 135–147)
WBC # BLD AUTO: 21.58 THOUSAND/UL (ref 4.31–10.16)

## 2022-12-26 DIAGNOSIS — F41.9 ANXIETY: ICD-10-CM

## 2022-12-26 DIAGNOSIS — E78.2 HYPERLIPIDEMIA, MIXED: ICD-10-CM

## 2022-12-27 RX ORDER — ROSUVASTATIN CALCIUM 5 MG/1
5 TABLET, COATED ORAL DAILY
Qty: 90 TABLET | Refills: 3 | Status: SHIPPED | OUTPATIENT
Start: 2022-12-27

## 2022-12-27 RX ORDER — ESCITALOPRAM OXALATE 10 MG/1
10 TABLET ORAL DAILY
Qty: 90 TABLET | Refills: 3 | Status: SHIPPED | OUTPATIENT
Start: 2022-12-27

## 2022-12-28 NOTE — ASSESSMENT & PLAN NOTE
58-year-old with stage IA uterine carcinosarcoma s/p 5 cycles of adjuvant chemotherapy with Taxol 175mg/m2 and Carboplatin AUC 6  She is feeling well and has no acute concerns  Her  has normalized at 13 1 U/ml  She had a recent PET done on 12/20/22 that showed no metastatic disease  Her PS is 0  Patient will proceed with cycle 6 of treatment, pending labs  She will return to the office as per her chemotherapy calendar  She will call in the interim with any concerns  At this time, we will refer her to radiation oncology

## 2022-12-29 ENCOUNTER — TELEMEDICINE (OUTPATIENT)
Dept: GYNECOLOGIC ONCOLOGY | Facility: CLINIC | Age: 67
End: 2022-12-29

## 2022-12-29 ENCOUNTER — APPOINTMENT (OUTPATIENT)
Dept: LAB | Facility: HOSPITAL | Age: 67
End: 2022-12-29

## 2022-12-29 DIAGNOSIS — C55 UTERINE CARCINOSARCOMA (HCC): ICD-10-CM

## 2022-12-29 DIAGNOSIS — C54.1 MALIGNANT NEOPLASM OF ENDOMETRIUM (HCC): ICD-10-CM

## 2022-12-29 DIAGNOSIS — C55 UTERINE CARCINOSARCOMA (HCC): Primary | ICD-10-CM

## 2022-12-29 LAB
ALBUMIN SERPL BCP-MCNC: 3.9 G/DL (ref 3.5–5)
ALP SERPL-CCNC: 93 U/L (ref 46–116)
ALT SERPL W P-5'-P-CCNC: 16 U/L (ref 12–78)
ANION GAP SERPL CALCULATED.3IONS-SCNC: 6 MMOL/L (ref 4–13)
AST SERPL W P-5'-P-CCNC: 13 U/L (ref 5–45)
BASOPHILS # BLD AUTO: 0.03 THOUSANDS/ÂΜL (ref 0–0.1)
BASOPHILS NFR BLD AUTO: 1 % (ref 0–1)
BILIRUB SERPL-MCNC: 0.37 MG/DL (ref 0.2–1)
BUN SERPL-MCNC: 16 MG/DL (ref 5–25)
CALCIUM SERPL-MCNC: 9 MG/DL (ref 8.3–10.1)
CANCER AG125 SERPL-ACNC: 15.2 U/ML (ref 0–30)
CHLORIDE SERPL-SCNC: 109 MMOL/L (ref 96–108)
CO2 SERPL-SCNC: 26 MMOL/L (ref 21–32)
CREAT SERPL-MCNC: 0.56 MG/DL (ref 0.6–1.3)
EOSINOPHIL # BLD AUTO: 0.03 THOUSAND/ÂΜL (ref 0–0.61)
EOSINOPHIL NFR BLD AUTO: 1 % (ref 0–6)
ERYTHROCYTE [DISTWIDTH] IN BLOOD BY AUTOMATED COUNT: 17.8 % (ref 11.6–15.1)
GFR SERPL CREATININE-BSD FRML MDRD: 95 ML/MIN/1.73SQ M
GLUCOSE P FAST SERPL-MCNC: 98 MG/DL (ref 65–99)
HCT VFR BLD AUTO: 37.2 % (ref 34.8–46.1)
HGB BLD-MCNC: 11.4 G/DL (ref 11.5–15.4)
IMM GRANULOCYTES # BLD AUTO: 0.05 THOUSAND/UL (ref 0–0.2)
IMM GRANULOCYTES NFR BLD AUTO: 1 % (ref 0–2)
LYMPHOCYTES # BLD AUTO: 1.02 THOUSANDS/ÂΜL (ref 0.6–4.47)
LYMPHOCYTES NFR BLD AUTO: 19 % (ref 14–44)
MAGNESIUM SERPL-MCNC: 2 MG/DL (ref 1.6–2.6)
MCH RBC QN AUTO: 29.5 PG (ref 26.8–34.3)
MCHC RBC AUTO-ENTMCNC: 30.6 G/DL (ref 31.4–37.4)
MCV RBC AUTO: 96 FL (ref 82–98)
MONOCYTES # BLD AUTO: 0.48 THOUSAND/ÂΜL (ref 0.17–1.22)
MONOCYTES NFR BLD AUTO: 9 % (ref 4–12)
NEUTROPHILS # BLD AUTO: 3.9 THOUSANDS/ÂΜL (ref 1.85–7.62)
NEUTS SEG NFR BLD AUTO: 69 % (ref 43–75)
NRBC BLD AUTO-RTO: 0 /100 WBCS
PLATELET # BLD AUTO: 297 THOUSANDS/UL (ref 149–390)
PMV BLD AUTO: 8.7 FL (ref 8.9–12.7)
POTASSIUM SERPL-SCNC: 4 MMOL/L (ref 3.5–5.3)
PROT SERPL-MCNC: 6.6 G/DL (ref 6.4–8.4)
RBC # BLD AUTO: 3.87 MILLION/UL (ref 3.81–5.12)
SODIUM SERPL-SCNC: 141 MMOL/L (ref 135–147)
WBC # BLD AUTO: 5.51 THOUSAND/UL (ref 4.31–10.16)

## 2022-12-29 RX ORDER — PALONOSETRON 0.05 MG/ML
0.25 INJECTION, SOLUTION INTRAVENOUS ONCE
Status: CANCELLED | OUTPATIENT
Start: 2023-01-03

## 2022-12-29 RX ORDER — SODIUM CHLORIDE 9 MG/ML
20 INJECTION, SOLUTION INTRAVENOUS ONCE
Status: CANCELLED | OUTPATIENT
Start: 2023-01-03

## 2022-12-29 NOTE — PROGRESS NOTES
Virtual Regular Visit    Verification of patient location:    Patient is located in the following state in which I hold an active license [place]      Assessment/Plan:    Problem List Items Addressed This Visit        Genitourinary    Uterine carcinosarcoma ([facility] [address] ) - Primary     68-year-old with stage IA uterine carcinosarcoma s/p 5 cycles of adjuvant chemotherapy with Taxol 175mg/m2 and Carboplatin AUC 6  She is feeling well and has no acute concerns  Her  has normalized at 13 1 U/ml  She had a recent PET done on 12/20/22 that showed no metastatic disease  Her PS is 0  Patient will proceed with cycle 6 of treatment, pending labs  She will return to the office as per her chemotherapy calendar  She will call in the interim with any concerns  At this time, we will refer her to radiation oncology  Relevant Orders    Ambulatory referral to Radiation Oncology            Reason for visit is pre-chemo eval  Chief Complaint   Patient presents with   • Virtual Regular Visit        Encounter provider WENDY Huerta    Provider located at [address] [address] [phone]      Recent Visits  No visits were found meeting these conditions  Showing recent visits within past 7 days and meeting all other requirements  Today's Visits  Date Type Provider Dept   12/29/22 Telemedicine Keegan Willis, [facility] today's visits and meeting all other requirements  Future Appointments  No visits were found meeting these conditions  Showing future appointments within next 150 days and meeting all other requirements       The patient was identified by name and date of birth  Monhetal Ruy was informed that this is a telemedicine visit and that the visit is being conducted through Telephone   It was my intention to perform this visit via video, but the patient does not have video capability so the visit was performed though audio telephone only  My office door was closed  No one else was in the room  She acknowledged consent and understanding of privacy and security of the video platform  The patient has agreed to participate and understands they can discontinue the visit at any time  Patient is aware this is a billable service  Lorenzo Elmore is a 69 y o  female       This is a 69 y o  female who presents to the office for pre-chemotherapy evaluation  She has been tolerating chemotherapy well and is without acute complaints  She has been afebrile  She denies nausea or vomiting  Her appetite is appropriate  She is voiding and moving her bowels without difficulty  She denies abdominal or pelvic pain  The patient is without vaginal bleeding or discharge  She denies chemotherapy-induced neuropathy  She is ambulatory           Past Medical History:   Diagnosis Date   • Anxiety    • Hypercholesterolemia        Past Surgical History:   Procedure Laterality Date   • COLONOSCOPY      complete    • DILATION AND CURETTAGE OF UTERUS      x2    • FL GUIDED CENTRAL VENOUS ACCESS DEVICE INSERTION  9/6/2022   • MN INSJ TUNNELED CTR VAD W/SUBQ PORT AGE 5 YR/> N/A 9/6/2022    Procedure: INSERTION VENOUS PORT ( PORT-A-CATH) IR;  Surgeon: Joo Byrd DO;  Location: [facility] MAIN OR;  Service: Interventional Radiology   • MN LAPAROSCOPY W TOT HYSTERECTUTERUS <=250 GRAM  W TUBE/OVARY N/A 8/18/2022    Procedure: HYSTERECTOMY LAPAROSCOPIC TOTAL ([address]) W/ ROBOTICS, BILATERAL SALPINGO-OOPHORECTOMY, SENTINEL LYMPH NODE MAPPING AND BIOPSY;  Surgeon: Jaya Gutierrez MD;  Location: [facility] MAIN OR;  Service: Gynecology Oncology       Current Outpatient Medications   Medication Sig Dispense Refill   • cholecalciferol (VITAMIN D3) 1,000 units tablet Take 1,000 Units by mouth daily     • cyanocobalamin (VITAMIN B-12) 100 MCG tablet Take 100 mcg by mouth daily     • escitalopram (LEXAPRO) 10 mg tablet TAKE 1 TABLET (10 MG TOTAL) BY MOUTH DAILY 90 tablet 3   • loratadine-pseudoephedrine (CLARITIN-D 24-HOUR)  mg per 24 hr tablet Take 1 tablet by mouth daily     • LORazepam (ATIVAN) 1 mg tablet Take 1 tablet (1 mg total) by mouth every 6 (six) hours as needed for anxiety (or nausea) 36 tablet 1   • Multiple Vitamins-Minerals (multivitamin with minerals) tablet Take 1 tablet by mouth daily     • Multiple Vitamins-Minerals (OCUVITE PO) Take by mouth     • Multiple Vitamins-Minerals (OCUVITE PO) Take by mouth in the morning     • naloxone (NARCAN) 4 mg/0 1 mL nasal spray Administer 1 spray into a nostril  If no response after 2-3 minutes, give another dose in the other nostril using a new spray  1 each 1   • ondansetron (ZOFRAN) 8 mg tablet TAKE 1 TABLET (8 MG TOTAL) BY MOUTH EVERY 8 (EIGHT) HOURS AS NEEDED FOR NAUSEA OR VOMITING 30 tablet 1   • rosuvastatin (CRESTOR) 5 mg tablet TAKE 1 TABLET (5 MG TOTAL) BY MOUTH DAILY 90 tablet 3     No current facility-administered medications for this visit  Allergies   Allergen Reactions   • Acetazolamide    • Sulfa Antibiotics Facial Swelling     Redness itching       Oncology History   Uterine carcinosarcoma ([facility] [address] )   8/18/2022 Initial Diagnosis    Uterine carcinosarcoma ([facility] [address] )     8/26/2022 -  Cancer Staged    Staging form: Corpus Uteri - Carcinoma, AJCC 8th Edition  - Clinical stage from 8/26/2022: FIGO Stage IA (cT1a, cN0, cM0) - Signed by Sarai Lieberman MD on 8/26/2022  Histopathologic type: Carcinosarcoma  Stage prefix: Initial diagnosis  Histologic grade (G): G3  Histologic grading system: 3 grade system       8/2022 Surgery    Patient and did went robotic hysterectomy bilateral salpingo-oophorectomy sentinel lymph node mapping and biopsy for stage I A non invasive carcinosarcoma of the endometrium  The uterus did fracture within the vagina but no leakage into the abdomen was noted       9/19/2022 -  Chemotherapy    palonosetron (ALOXI), 0 25 mg, Intravenous, Once, 5 of 6 cycles  Administration: 0 25 mg (9/19/2022), 0 25 mg (10/10/2022), 0 25 mg (10/31/2022), 0 25 mg (11/21/2022), 0 25 mg (12/12/2022)  pegfilgrastim (NEULASTA), 6 mg, Subcutaneous, Once, 4 of 5 cycles  Administration: 6 mg (10/11/2022), 6 mg (11/1/2022), 6 mg (11/22/2022)  fosaprepitant (EMEND) IVPB, 150 mg, Intravenous, Once, 5 of 6 cycles  Administration: 150 mg (9/19/2022), 150 mg (10/10/2022), 150 mg (10/31/2022), 150 mg (11/21/2022), 150 mg (12/12/2022)  CARBOplatin (PARAPLATIN) IVPB (GOG AUC DOSING), 572 4 mg, Intravenous, Once, 5 of 6 cycles  Administration: 572 4 mg (9/19/2022), 579 6 mg (10/10/2022), 579 6 mg (10/31/2022), 575 4 mg (11/21/2022), 564 mg (12/12/2022)  PACLItaxel (TAXOL) chemo IVPB, 175 mg/m2 = 274 8 mg, Intravenous, Once, 5 of 6 cycles  Administration: 274 8 mg (9/19/2022), 278 4 mg (10/10/2022), 278 4 mg (10/31/2022), 276 6 mg (11/21/2022), 273 mg (12/12/2022)           Review of Systems   Constitutional: Negative for activity change, appetite change, chills, fatigue, fever and unexpected weight change  HENT: Negative for mouth sores  Eyes: Negative  Respiratory: Negative for cough, chest tightness, shortness of breath and wheezing  Cardiovascular: Negative for chest pain, palpitations and leg swelling  Gastrointestinal: Negative for abdominal distention, abdominal pain, anal bleeding, blood in stool, constipation, diarrhea, nausea and vomiting  Endocrine: Negative  Genitourinary: Negative for difficulty urinating, dysuria, flank pain, frequency, hematuria, pelvic pain, urgency, vaginal bleeding, vaginal discharge and vaginal pain  Musculoskeletal: Negative for arthralgias and myalgias  Skin: Negative for color change, pallor and rash  Neurological: Negative for dizziness, weakness, numbness and headaches  Hematological: Negative  Psychiatric/Behavioral: The patient is not nervous/anxious              I spent 15 minutes directly with the patient during this visit

## 2023-01-03 ENCOUNTER — HOSPITAL ENCOUNTER (OUTPATIENT)
Dept: INFUSION CENTER | Facility: CLINIC | Age: 68
Discharge: HOME | End: 2023-01-03

## 2023-01-03 VITALS
WEIGHT: 127 LBS | RESPIRATION RATE: 18 BRPM | HEART RATE: 52 BPM | SYSTOLIC BLOOD PRESSURE: 134 MMHG | DIASTOLIC BLOOD PRESSURE: 77 MMHG | TEMPERATURE: 98.9 F | HEIGHT: 61 IN | BODY MASS INDEX: 23.98 KG/M2

## 2023-01-03 DIAGNOSIS — C55 UTERINE CARCINOSARCOMA (HCC): Primary | ICD-10-CM

## 2023-01-03 RX ORDER — PALONOSETRON 0.05 MG/ML
0.25 INJECTION, SOLUTION INTRAVENOUS ONCE
Status: COMPLETED | OUTPATIENT
Start: 2023-01-03 | End: 2023-01-03

## 2023-01-03 RX ORDER — SODIUM CHLORIDE 9 MG/ML
20 INJECTION, SOLUTION INTRAVENOUS ONCE
Status: COMPLETED | OUTPATIENT
Start: 2023-01-03 | End: 2023-01-03

## 2023-01-03 RX ADMIN — FOSAPREPITANT 150 MG: 150 INJECTION, POWDER, LYOPHILIZED, FOR SOLUTION INTRAVENOUS at 10:19

## 2023-01-03 RX ADMIN — DEXAMETHASONE SODIUM PHOSPHATE 20 MG: 10 INJECTION, SOLUTION INTRAMUSCULAR; INTRAVENOUS at 08:41

## 2023-01-03 RX ADMIN — CARBOPLATIN 564 MG: 10 INJECTION, SOLUTION INTRAVENOUS at 14:07

## 2023-01-03 RX ADMIN — DIPHENHYDRAMINE HYDROCHLORIDE 25 MG: 50 INJECTION, SOLUTION INTRAMUSCULAR; INTRAVENOUS at 09:09

## 2023-01-03 RX ADMIN — PALONOSETRON 0.25 MG: 0.05 INJECTION, SOLUTION INTRAVENOUS at 09:04

## 2023-01-03 RX ADMIN — FAMOTIDINE 20 MG: 10 INJECTION, SOLUTION INTRAVENOUS at 09:34

## 2023-01-03 RX ADMIN — SODIUM CHLORIDE 20 ML/HR: 0.9 INJECTION, SOLUTION INTRAVENOUS at 08:38

## 2023-01-03 RX ADMIN — PACLITAXEL 273 MG: 6 INJECTION, SOLUTION, CONCENTRATE INTRAVENOUS at 11:00

## 2023-01-03 NOTE — PROGRESS NOTES
Pt reports to unit offering no complaints today  Patient receiving Carboplatin and Taxol , tolerated infusion well without any adverse events, positive blood return throughout  Patient aware of next appointment tomorrow, Port deaccessed, AVS provided to patient upon discharge

## 2023-01-04 ENCOUNTER — HOSPITAL ENCOUNTER (OUTPATIENT)
Dept: INFUSION CENTER | Facility: CLINIC | Age: 68
Discharge: HOME | End: 2023-01-04

## 2023-01-04 DIAGNOSIS — C55 UTERINE CARCINOSARCOMA (HCC): Primary | ICD-10-CM

## 2023-01-04 RX ADMIN — PEGFILGRASTIM 6 MG: 6 INJECTION SUBCUTANEOUS at 15:19

## 2023-01-05 ENCOUNTER — APPOINTMENT (OUTPATIENT)
Dept: LAB | Facility: HOSPITAL | Age: 68
End: 2023-01-05

## 2023-01-05 DIAGNOSIS — C55 UTERINE CARCINOSARCOMA (HCC): ICD-10-CM

## 2023-01-05 LAB
ALBUMIN SERPL BCP-MCNC: 3.6 G/DL (ref 3.5–5)
ALP SERPL-CCNC: 74 U/L (ref 46–116)
ALT SERPL W P-5'-P-CCNC: 13 U/L (ref 12–78)
ANION GAP SERPL CALCULATED.3IONS-SCNC: 9 MMOL/L (ref 4–13)
AST SERPL W P-5'-P-CCNC: 25 U/L (ref 5–45)
BASOPHILS # BLD MANUAL: 0 THOUSAND/UL (ref 0–0.1)
BASOPHILS NFR MAR MANUAL: 0 % (ref 0–1)
BILIRUB SERPL-MCNC: 0.59 MG/DL (ref 0.2–1)
BUN SERPL-MCNC: 21 MG/DL (ref 5–25)
CALCIUM SERPL-MCNC: 8.2 MG/DL (ref 8.3–10.1)
CHLORIDE SERPL-SCNC: 105 MMOL/L (ref 96–108)
CO2 SERPL-SCNC: 27 MMOL/L (ref 21–32)
CREAT SERPL-MCNC: 0.55 MG/DL (ref 0.6–1.3)
EOSINOPHIL # BLD MANUAL: 0 THOUSAND/UL (ref 0–0.4)
EOSINOPHIL NFR BLD MANUAL: 0 % (ref 0–6)
ERYTHROCYTE [DISTWIDTH] IN BLOOD BY AUTOMATED COUNT: 16 % (ref 11.6–15.1)
GFR SERPL CREATININE-BSD FRML MDRD: 96 ML/MIN/1.73SQ M
GLUCOSE P FAST SERPL-MCNC: 84 MG/DL (ref 65–99)
HCT VFR BLD AUTO: 34.9 % (ref 34.8–46.1)
HGB BLD-MCNC: 10.9 G/DL (ref 11.5–15.4)
LYMPHOCYTES # BLD AUTO: 2.28 THOUSAND/UL (ref 0.6–4.47)
LYMPHOCYTES # BLD AUTO: 6 % (ref 14–44)
MAGNESIUM SERPL-MCNC: 1.8 MG/DL (ref 1.6–2.6)
MCH RBC QN AUTO: 30.3 PG (ref 26.8–34.3)
MCHC RBC AUTO-ENTMCNC: 31.2 G/DL (ref 31.4–37.4)
MCV RBC AUTO: 97 FL (ref 82–98)
MONOCYTES # BLD AUTO: 0.38 THOUSAND/UL (ref 0–1.22)
MONOCYTES NFR BLD: 1 % (ref 4–12)
NEUTROPHILS # BLD MANUAL: 34.95 THOUSAND/UL (ref 1.85–7.62)
NEUTS BAND NFR BLD MANUAL: 5 % (ref 0–8)
NEUTS SEG NFR BLD AUTO: 87 % (ref 43–75)
PLATELET # BLD AUTO: 212 THOUSANDS/UL (ref 149–390)
PLATELET BLD QL SMEAR: ADEQUATE
PMV BLD AUTO: 9.2 FL (ref 8.9–12.7)
POTASSIUM SERPL-SCNC: 3.8 MMOL/L (ref 3.5–5.3)
PROT SERPL-MCNC: 6.2 G/DL (ref 6.4–8.4)
RBC # BLD AUTO: 3.6 MILLION/UL (ref 3.81–5.12)
SODIUM SERPL-SCNC: 141 MMOL/L (ref 135–147)
VARIANT LYMPHS # BLD AUTO: 1 %
WBC # BLD AUTO: 37.99 THOUSAND/UL (ref 4.31–10.16)

## 2023-01-09 ENCOUNTER — CLINICAL SUPPORT (OUTPATIENT)
Dept: RADIATION ONCOLOGY | Facility: CLINIC | Age: 68
End: 2023-01-09
Attending: RADIOLOGY

## 2023-01-09 ENCOUNTER — RADIATION ONCOLOGY CONSULT (OUTPATIENT)
Dept: RADIATION ONCOLOGY | Facility: CLINIC | Age: 68
End: 2023-01-09

## 2023-01-09 VITALS
HEART RATE: 106 BPM | BODY MASS INDEX: 23.2 KG/M2 | WEIGHT: 124 LBS | OXYGEN SATURATION: 99 % | DIASTOLIC BLOOD PRESSURE: 76 MMHG | TEMPERATURE: 97.5 F | RESPIRATION RATE: 16 BRPM | SYSTOLIC BLOOD PRESSURE: 130 MMHG

## 2023-01-09 DIAGNOSIS — C54.1 MALIGNANT NEOPLASM OF ENDOMETRIUM (HCC): Primary | ICD-10-CM

## 2023-01-09 DIAGNOSIS — C55 UTERINE CARCINOSARCOMA (HCC): Primary | ICD-10-CM

## 2023-01-09 DIAGNOSIS — C55 UTERINE CARCINOSARCOMA (HCC): ICD-10-CM

## 2023-01-09 NOTE — PROGRESS NOTES
Consultation - Radiation Oncology      VRQ:[id number] : 1955  Encounter: [id number]  Patient Information: [address]  Chief Complaint   Patient presents with   • Uterine Cancer   • Consult     Cancer Staging   Uterine carcinosarcoma [facility])  Staging form: Corpus Uteri - Carcinoma, AJCC 8th Edition  - Clinical stage from 2022: FIGO Stage IA (cT1a, cN0, cM0) - Signed by Tutu Luna MD on 2022  Histopathologic type: Carcinosarcoma, NOS  Stage prefix: Initial diagnosis  Histologic grade (G): G3  Histologic grading system: 3 grade system           History of Present Illness   Ninoska Shrestha is a 77 y o   female with a 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  She presents for consideration for adjuvant radiation today  Briefly, the patient presented to the ED with postmenopausal bleeding in 22 CT A/P wo contrast   Abnormal appearance of the uterus as described above   Correlate for endometrial hyperplasia versus endometrial neoplasm   Recommend follow-up GYN oncology consultation and/or routine pelvic ultrasound    Colonic diverticulosis  Large hiatal hernia/partially intrathoracic stomach   If warranted, consider follow-up GI and/or surgical consultation      22 US pelvis complete with transvaginal  Abnormally expanded endometrial cavity containing heterogeneous mass with internal vascularity and blood products   Differential diagnosis includes endometrial neoplasm, endometrial hyperplasia versus less likely degenerated intracavitary myoma  She was referred to GYN Oncology for consultation:   22 Dr Juancarlos Chavez  history of postmenopausal bleeding   She had CT scan and ultrasound both reveal a single abnormality in a 3+ cm endometrial stripe     A Pipelle biopsy was performed today    If hyperplasia or malignancy is noted then consideration for hysterectomy would be undertaken    7/20/22  Pathology: Markedly atypical glandular and stromal elements, suspicious for carcinosarcoma     8/3/22 Dr Lashaun Javed  new diagnosis of endometrial carcinosarcoma by her most recent biopsy   This appears to be early stage by clinical exam    at this point to surgical management is her best option       8/18/22 HYSTERECTOMY LAPAROSCOPIC TOTAL ([address]) W/ ROBOTICS, BILATERAL SALPINGO-OOPHORECTOMY, SENTINEL LYMPH NODE MAPPING AND BIOPSY (Abdomen)  Of note, operative notes indicated that the uterus split in 2 during removal through the vagina  Operative notes indicate no abdominal contamination  Pathology assessment/staging limited due to tumor and uterus submission in sections  Pathology demonstrated a 6 3cm tumor, carcinosarcoma associated with myometrial invasion on specimen, but depth could not be assessed as it was a separate specimen submitted  Margins were negative  0/3 sentinel lymph nodes demonstrated metastases  Pelvic washings were negative  [facility] repair genes were intact       The patient tolerated surgery well without significant complications  Adjuvant chemotherapy was initiated with carboplatin and paclitaxel  9/19/22-1/4/23 Completed 6 cycles of chemotherapy  Per patient, tolerated treatment well without need for dose reduction or significant dose delay  She received Neulasta injection last week with final chemotherapy as well      12/7/22 Dr Lashaun Javed  Due to carcinosarcoma subtype and elevated  the PET scan will be performed after completion of chemotherapy   This will be scheduled today      12/20/22 PET CT   1 Diffusely increased marrow activity likely is related to recent therapy    2   No hypermetabolic metastases visualized         Component    Latest Ref Rng & Units 0 0 - 30 0 U/mL   9/16/2022 84 9 (H)   9/22/2022 85 4 (H)   9/29/2022 67 6 (H)   10/27/2022 45 5 (H)   11/17/2022 37 3 (H)   12/8/2022 13 1   12/29/2022 15 2         Currently, the patient notes fatigue and some constipation with discomfort with defecation  She notes that this happened with each cycle of chemotherapy and resolved after about 1 week  She notes occasional streaks of BRBPR with straining during defecation  None otherwise  She denies diarrhea  She denies vaginal discharge of bleeding  She denies vaginal irritation  She denies abdominal or pelvic cramping  She denies significant urinary symptoms including dysuria, hematuria, or incontinence  The patient was discussed in multidisciplinary GYN tumor board and adjuvant chemotherapy and vaginal brachytherapy were recommended at that time  Historical Information   Oncology History   Uterine carcinosarcoma ([facility] [address] )   7/20/2022 Biopsy    Endometrium, EMB:  - Markedly atypical glandular and stromal elements, suspicious for carcinosarcoma  See comment  Comment: There are superficial strips of atypical glandular elements which are diffusely positive for p53 by immunohistochemistry  Additionally, the the stromal elements demonstrate significant nuclear pleomorphism  The overall findings are suspicious for carcinosarcoma  8/18/2022 Initial Diagnosis    Uterine carcinosarcoma ([facility] [address] )     8/26/2022 -  Cancer Staged    Staging form: Corpus Uteri - Carcinoma, AJCC 8th Edition  - Clinical stage from 8/26/2022: FIGO Stage IA (cT1a, cN0, cM0) - Signed by Dvae Govea MD on 8/26/2022  Histopathologic type: Carcinosarcoma  Stage prefix: Initial diagnosis  Histologic grade (G): G3  Histologic grading system: 3 grade system       8/2022 Surgery    Patient and did went robotic hysterectomy bilateral salpingo-oophorectomy sentinel lymph node mapping and biopsy for stage I A non invasive carcinosarcoma of the endometrium  The uterus did fracture within the vagina but no leakage into the abdomen was noted       9/19/2022 -  Chemotherapy    palonosetron (ALOXI), 0 25 mg, Intravenous, Once, 6 of 6 cycles  Administration: 0 25 mg (9/19/2022), 0 25 mg (10/10/2022), 0 25 mg (10/31/2022), 0 25 mg (11/21/2022), 0 25 mg (12/12/2022), 0 25 mg (1/3/2023)  pegfilgrastim (NEULASTA), 6 mg, Subcutaneous, Once, 5 of 5 cycles  Administration: 6 mg (10/11/2022), 6 mg (11/1/2022), 6 mg (11/22/2022), 6 mg (12/13/2022), 6 mg (1/4/2023)  fosaprepitant (EMEND) IVPB, 150 mg, Intravenous, Once, 6 of 6 cycles  Administration: 150 mg (9/19/2022), 150 mg (10/10/2022), 150 mg (10/31/2022), 150 mg (11/21/2022), 150 mg (12/12/2022), 150 mg (1/3/2023)  CARBOplatin (PARAPLATIN) IVPB (GOG AUC DOSING), 572 4 mg, Intravenous, Once, 6 of 6 cycles  Administration: 572 4 mg (9/19/2022), 579 6 mg (10/10/2022), 579 6 mg (10/31/2022), 575 4 mg (11/21/2022), 564 mg (12/12/2022), 564 mg (1/3/2023)  PACLItaxel (TAXOL) chemo IVPB, 175 mg/m2 = 274 8 mg, Intravenous, Once, 6 of 6 cycles  Administration: 274 8 mg (9/19/2022), 278 4 mg (10/10/2022), 278 4 mg (10/31/2022), 276 6 mg (11/21/2022), 273 mg (12/12/2022), 273 mg (1/3/2023)           Past Medical History:   Diagnosis Date   • Anxiety    • Hypercholesterolemia    • Uterine cancer [facility])      Past Surgical History:   Procedure Laterality Date   • COLONOSCOPY      complete    • DILATION AND CURETTAGE OF UTERUS      x2    • FL GUIDED CENTRAL VENOUS ACCESS DEVICE INSERTION  9/6/2022   • TN INSJ TUNNELED CTR VAD W/SUBQ PORT AGE 5 YR/> N/A 9/6/2022    Procedure: INSERTION VENOUS PORT ( PORT-A-CATH) IR;  Surgeon: Isai Ko DO;  [facility];  Service: Interventional Radiology   • TN LAPS TOTAL HYSTERECT 250 GM/< W/RMVL TUBE/OVARY N/A 8/18/2022    Procedure: HYSTERECTOMY LAPAROSCOPIC TOTAL ([address]) W/ ROBOTICS, BILATERAL SALPINGO-OOPHORECTOMY, SENTINEL LYMPH NODE MAPPING AND BIOPSY;  Surgeon: Ebony Lugo MD;  Location[facility];  Service: Gynecology Oncology       Family History   Problem Relation Age of Onset   • Lung cancer Father    • Brain cancer Father    • Arthritis Family    • Hypertension Family         essential     • Hyperlipidemia Family    • Breast cancer Cousin    • Endometrial cancer Neg Hx    • Ovarian cancer Neg Hx    • Colon cancer Neg Hx        Social History   Social History     Substance and Sexual Activity   Alcohol Use No     Social History     Substance and Sexual Activity   Drug Use No     Social History     Tobacco Use   Smoking Status Never   Smokeless Tobacco Never         Meds/Allergies     Current Outpatient Medications:   •  cholecalciferol (VITAMIN D3) 1,000 units tablet, Take 1,000 Units by mouth daily, Disp: , Rfl:   •  cyanocobalamin (VITAMIN B-12) 100 MCG tablet, Take 100 mcg by mouth daily, Disp: , Rfl:   •  escitalopram (LEXAPRO) 10 mg tablet, TAKE 1 TABLET (10 MG TOTAL) BY MOUTH DAILY, Disp: 90 tablet, Rfl: 3  •  loratadine-pseudoephedrine (CLARITIN-D 24-HOUR)  mg per 24 hr tablet, Take 1 tablet by mouth daily, Disp: , Rfl:   •  Multiple Vitamins-Minerals (OCUVITE PO), Take by mouth, Disp: , Rfl:   •  ondansetron (ZOFRAN) 8 mg tablet, TAKE 1 TABLET (8 MG TOTAL) BY MOUTH EVERY 8 (EIGHT) HOURS AS NEEDED FOR NAUSEA OR VOMITING, Disp: 30 tablet, Rfl: 1  •  rosuvastatin (CRESTOR) 5 mg tablet, TAKE 1 TABLET (5 MG TOTAL) BY MOUTH DAILY, Disp: 90 tablet, Rfl: 3  •  LORazepam (ATIVAN) 1 mg tablet, Take 1 tablet (1 mg total) by mouth every 6 (six) hours as needed for anxiety (or nausea) (Patient not taking: Reported on 1/9/2023), Disp: 36 tablet, Rfl: 1  •  Multiple Vitamins-Minerals (multivitamin with minerals) tablet, Take 1 tablet by mouth daily (Patient not taking: Reported on 1/9/2023), Disp: , Rfl:   •  Multiple Vitamins-Minerals (OCUVITE PO), Take by mouth in the morning, Disp: , Rfl:   •  naloxone (NARCAN) 4 mg/0 1 mL nasal spray, Administer 1 spray into a nostril  If no response after 2-3 minutes, give another dose in the other nostril using a new spray   (Patient not taking: Reported on 1/9/2023), Disp: 1 each, Rfl: 1  Allergies   Allergen Reactions   • Acetazolamide    • Sulfa Antibiotics Facial Swelling     Redness itching       Review of Systems   Constitutional: Negative  HENT: Negative  Eyes:        Wears glasses   Respiratory: Negative  Cardiovascular: Negative  Gastrointestinal: Positive for constipation  Genitourinary: Negative  Negative for frequency, pelvic pain, urgency, vaginal bleeding, vaginal discharge and vaginal pain  Musculoskeletal: Negative  Skin: Negative  Allergic/Immunologic: Positive for immunocompromised state  Neurological: Negative  Hematological: Negative  Psychiatric/Behavioral: Negative           OB/GYN History:  The patient underwent menarche at 13 years  Menopause Status Pre, Kasia, Unknown and No Answer  No LMP recorded  Patient has had a hysterectomy  Menopause at 37} years  Menopause Reason Depo- provera for many years  Hormone replacement therapy: no      2   Para 2   Age at first delivery being 21 years  Nursing: no    Birth control pills: yes  Years used 10         OBJECTIVE:   /76   Pulse (!) 106   Temp 97 5 °F (36 4 °C)   Resp 16   Wt 56 2 kg (124 lb)   SpO2 99%   BMI 23 20 kg/m²   Performance Status: Karnofsky: 80 - Normal activity with effort; some signs or symptoms of disease    Physical Exam  Vitals and nursing note reviewed  Constitutional:       General: She is not in acute distress  Appearance: She is well-developed  Cardiovascular:      Rate and Rhythm: Normal rate and regular rhythm  Pulmonary:      Breath sounds: No wheezing, rhonchi or rales  Abdominal:      General: There is no distension  Palpations: Abdomen is soft  Tenderness: There is no abdominal tenderness  Comments: Well-healed port wounds  Genitourinary:     Comments: GYN examination demonstrates normal female external genitalia  There is some redundancy of the labia majora inferiorly, but no suspicious skin lesions, rashes, or palpable nodules  There is significant foreshortening of the vaginal cuff    There are no adhesions or stenosis appreciated  The vaginal cuff is closed  There are no palpable nodules  Speculum examination demonstrates the cuff is well-healed and there are no suspicious lesions of the vaginal cuff  The patient does have patchy moist desquamation and skin changes of the perianal region  Musculoskeletal:      Right lower leg: No edema  Left lower leg: No edema  Lymphadenopathy:      Cervical: No cervical adenopathy  Upper Body:      Right upper body: No supraclavicular adenopathy  Left upper body: No supraclavicular adenopathy  Lower Body: No right inguinal adenopathy  No left inguinal adenopathy  Neurological:      Mental Status: She is alert and oriented to person, place, and time  Gait: Gait normal           RESULTS    Imaging Studies  NM PET CT skull base to mid thigh    Result Date: 12/20/2022  Narrative: PET/CT SCAN INDICATION: C55: Malignant neoplasm of uterus, part unspecified C54 1: Malignant neoplasm of endometrium , restaging postchemotherapy, status post ASLLY/BSO 8/18/2022 MODIFIER: PS COMPARISON: CT 6/26/2022 CELL TYPE:  Endometrial carcinosarcoma, endometrial biopsy 8/18/2022 TECHNIQUE:   8 3 mCi F-18-FDG administered IV  Multiplanar attenuation corrected and non attenuation corrected PET images are available for interpretation, and contiguous, low dose, axial CT sections were obtained from the vertex through the femurs following the administration of oral contrast material (7 5 cc Omnipaque-240 in 300 cc water)  Intravenous contrast material was not utilized  This examination, like all CT scans performed in the [facility], was performed utilizing techniques to minimize radiation dose exposure, including the use of iterative reconstruction and automated exposure control  Fasting serum glucose: 87 mg/dl FINDINGS: VISUALIZED BRAIN: Asymmetrically decreased activity in the right hemisphere as compared to the left   No acute abnormalities are seen  HEAD/NECK:   There is a physiologic distribution of FDG  No FDG avid cervical adenopathy is seen  CT images: Unremarkable  CHEST:   No FDG avid soft tissue lesions are seen  CT images: Right chest wall port  Coronary artery calcifications  Large hiatal hernia  ABDOMEN:   No FDG avid soft tissue lesions are seen  No hypermetabolic adenopathy  CT images: Unremarkable  PELVIS: No FDG avid soft tissue lesions are seen  No hypermetabolic adenopathy  CT images: Hysterectomy  OSSEOUS STRUCTURES: Diffusely increased marrow activity likely related to recent therapy  No focal FDG avid lesions are seen  CT images: No significant findings  Impression: 1  Diffusely increased marrow activity likely is related to recent therapy  2   No hypermetabolic metastases visualized  Workstation performed: [id number]       Pathology:   Collected 8/18/2022 13:36      Status: Final result      Visible to patient: Yes (seen)      Dx: Uterine carcinosarcoma [facility])      0 Result Notes  Component    Case Report   Surgical Pathology Report                         Case: [id number]                                    Authorizing Provider: Quan Bowman MD       Collected:           08/18/2022 1336               Ordering Location:     [facility]      Received:            08/18/2022 [facility]                                      Hospital Operating Room                                                       Pathologist:           Whitney Knowles MD                                                            Specimens:   A) - Lymph Node, Hay, right pelvic                                                              B) - Lymph Node, Hay, left pelvic                                                               C) - Uterus w/Bilateral Ovaries and Fallopian Tubes, and cervix                            Final Diagnosis   A   Lymph node, "Right pelvic sentinel lymph node," Resection:  - One lymph node, negative for malignancy, confirmed with immunohistochemistry (0/1)     B  Lymph node, "Reft pelvic sentinel lymph node," Resection:  - Two lymph nodes, negative for malignancy, confirmed with immunohistochemistry (0/2)     C  A  Uterus, cervix, and bilateral fallopian tubes, "Uterus with bilateral ovaries and fallopian tubes," Hysterectomy and salpingo-oophorectomy:  - Uterus, 133 grams  -- Cervix:   - Cervical squamous and glandular mucosa, negative for dysplasia, supported by p16/Ki-67 immunohistochemistry   -- Endometrium:   - Carcinosarcoma (Epithelial component serous type)  - Fragmented, largest dimension 6 3 cm  - No myometrial invasion identified  -- Myometrium:   - Leiomyomata   - No definite invasion identified  -- Serosa:   - No pathologic abnormality  -- Ovary, Left               - Benign ovary with physiologic change  -- Ovary, Right               - Benign ovary with physiologic change  -- Fallopian tube, Left  - Complete cross section of fallopian tube with no diagnostic abnormality  - Negative for malignancy  -- Fallopian tube, Right               - Complete cross section of fallopian tube with no diagnostic abnormality               - Negative for malignancy   Electronically signed by Regina Kelly MD on 8/29/2022 at [id number]   Comments: This is an appended report  These results have been appended to a previously preliminary verified report  Preliminary Diagnosis                      Preliminary result electronically signed by Regina Kelly MD on 8/26/2022 at [phone]   Note    Intradepartmental consultation in agreement (DD)     Immunohistochemistry was performed on block C21, C24, and C21 with adequate controls  The epithelial component is positive for AE1/AE3, p53, and p16  The stromal component is negative for AE1/AE3              MISMATCH REPAIR     Interpretation: No loss of nuclear expression of MMR proteins: Low probability of MSI-H     Results:  Antibody            Clone                 Description Results  MLH1                 M1                     Mismatch repair protein  Intact nuclear expression              MS2                   [id number]        Mismatch repair protein  Intact nuclear expression  MSH6                40                      Mismatch repair protein  Intact nuclear expression  PMS2                 [id number]           Mismatch repair protein  Intact nuclear expression     Comment: background non-neoplastic tissue and/or internal control with intact nuclear expression  A positive control for each antibody has been reviewed and accepted       These immunohistochemical tests were performed at [facility] in [place], [place] and interpreted by Dr Desiree Campbell  An electronic copy of this report will be kept on file in the Medical Records Department at [facility]             HER2/DMITRIY SOLID TUMOR ANALYSIS     Results:  Antibody                         Clone                 Description                                  Results  Her2/dmitriy by IHC             4B5                    c-erb-2 oncoprotein                     0/Negative     These immunohistochemical tests were performed at [facility] in [place], [place] and interpreted by Dr Desiree Campbell  An electronic copy of this report will be kept on file in the Medical Records Department at [facility]     Comment: This is an appended report  These results have been appended to a previously preliminary verified report     Additional Information    All reported additional testing was performed with appropriately reactive controls   These tests were developed and their performance characteristics determined by [facility] or appropriate performing facility, though some tests may be performed on tissues which have not been validated for performance characteristics (such as staining performed on alcohol exposed cell blocks and decalcified tissues)   Results should be interpreted with caution and in the context of the patients’ clinical condition  These tests may not be cleared or approved by the [place]  Food and Drug Administration, though the FDA has determined that such clearance or approval is not necessary  These tests are used for clinical purposes and they should not be regarded as investigational or for research  This laboratory has been approved by CLIA 88, designated as a high-complexity laboratory and is qualified to perform these tests   Interpretation performed at [facility], [address]      Synoptic Checklist   ENDOMETRIUM  8th Edition - Protocol posted: 12/17/2021  ENDOMETRIUM: HYSTERECTOMY - All Specimens  SPECIMEN   Procedure  Total hysterectomy and bilateral salpingo-oophorectomy    Hysterectomy Type  Vaginal    Specimen Integrity  Opened in vaginal canal  Per surgical note, no abdominal contanimation      TUMOR   Tumor Site  Endometrium    Tumor Size  Greatest Dimension (Centimeters): 6 3 (fragmented) cm   Histologic Type  Carcinosarcoma    Two-Tier Grading System  Not applicable    Myometrial Invasion  Present    Depth of Myometrial Invasion  0 mm   Myometrial Thickness  2 8 mm   Percentage of Myometrial Invasion  0 %   Adenomyosis  Not identified    Uterine Serosa Involvement  Not identified    Lower Uterine Segment Involvement  Not identified    Cervical Stromal Involvement  Not identified    Other Tissue / Organ Involvement  Not applicable    Peritoneal / Ascitic Fluid  Not submitted / unknown    Lymphovascular Invasion (LVI)  Not identified    MARGINS   Margin Status  All margins negative for invasive carcinoma    Closest Margin(s) to Invasive Carcinoma  Cannot be determined: Fragmented    REGIONAL LYMPH NODES   Regional Lymph Node Status  All regional lymph nodes negative for tumor cells    Lymph Nodes Examined     Total Number of Pelvic Nodes Examined  3    Number of Pelvic [place] Nodes Examined  3    Total Number of Para-aortic Nodes Examined 0    PATHOLOGIC STAGE CLASSIFICATION (pTNM, AJCC 8th Edition)   Reporting of pT, pN, and (when applicable) pM categories is based on information available to the pathologist at the time the report is issued  As per the AJCC (Chapter 1, 8th Ed ) it is the managing physician’s responsibility to establish the final pathologic stage based upon all pertinent information, including but potentially not limited to this pathology report  pT Category  pT1a    Regional Lymph Nodes Modifier  (sn)    pN Category  pN0    FIGO STAGE   FIGO Stage  IA      ASSESSMENT  1  Malignant neoplasm of endometrium ([address] )        2  Uterine carcinosarcoma [facility])  Ambulatory referral to Radiation Oncology        Cancer Staging   Uterine carcinosarcoma [facility])  Staging form: Corpus Uteri - Carcinoma, AJCC 8th Edition  - Clinical stage from 8/26/2022: FIGO Stage IA (cT1a, cN0, cM0) - Signed by Ebony Lugo MD on 8/26/2022  Histopathologic type: Carcinosarcoma, NOS  Stage prefix: Initial diagnosis  Histologic grade (G): G3  Histologic grading system: 3 grade system        PLAN/DISCUSSION  Madison Rangel is a 77 y o   female who was recently diagnosed with stage Ia carcinosarcoma of the uterus status postresection  She completed adjuvant chemotherapy approximately 1 week ago, which she tolerated well  She presents for consideration for adjuvant HDR brachytherapy to the vaginal cuff  I agree with the recommendation for adjuvant vaginal brachytherapy  We would plan for a total dose of 21-30 Gray in 3-5 fractions  I feel that this would offer the patient benefit in terms of local control and possible disease-free survival   Given uterine rupture in the vaginal canal during resection of carcinosarcoma and significant foreshortening post surgery, I would consider treating majority of the vagina  This is in accordance with NCCN guidelines        The rationale and potential benefits, as well as the risks and acute and late side effects and potential toxicities of radiation were discussed with the patient at length  Side effects discussed included, but were not limited to: Vaginal irritation, vaginal dryness, vaginal stenosis, rectal or anal irritation, diarrhea, irritative urinary symptoms, injury to bladder, bowel, or rectum  The patient has some desquamation of the anal region at this time  There does not appear to be infection  This may be related to chemotherapy as the patient notes symptoms approximately 1 week after chemotherapy that then resolves  Provided her with foaming cleanser and recommended a barrier cream such as Vaseline or Aquaphor until it heals  The patient was given the opportunity to ask questions and all questions were answered to their satisfaction  She wished to proceed with the recommended treatment plan  CT simulation scheduled in 1 to 2 weeks  We will evaluate full healing prior to beginning radiation treatment  Total Time Spent  42 minutes spent reviewing EMR in preparation for visit, with the patient, coordination with other providers, and documentation  Greater than 50% of total time was spent with the patient and/or family for counseling and/or coordination of care  Zoraida Lynn MD  1/9/2023,5:10 PM      Portions of the record may have been created with voice recognition software  Occasional wrong word or "sound a like" substitutions may have occurred due to the inherent limitations of voice recognition software  Read the chart carefully and recognize, using context, where substitutions have occurred

## 2023-01-09 NOTE — PROGRESS NOTES
Wendi Aguilar 1955 is a 77 y o  female with stage IA uterine carcinosarcoma  She originally presented to the ED with postmenopausal bleeding  She had endometrial biopsy followed by robotic total hysterectomy, bilateral salpingo-oophorectomy with sentinel lymph node biopsy on 8/18/22  She received adjuvant chemotherapy  She is being referred by Dr Lynn Reeves and presents today for consult for vaginal brachytherapy  6/26/22 ED for vaginal bleeding/ hematuria    6/26/22 CT A/P wo contrast   Abnormal appearance of the uterus as described above  Correlate for endometrial hyperplasia versus endometrial neoplasm  Recommend follow-up GYN oncology consultation and/or routine pelvic ultrasound  Colonic diverticulosis  Large hiatal hernia/partially intrathoracic stomach  If warranted, consider follow-up GI and/or surgical consultation  7/1/22 US pelvis complete with transvaginal  Abnormally expanded endometrial cavity containing heterogeneous mass with internal vascularity and blood products  Differential diagnosis includes endometrial neoplasm, endometrial hyperplasia versus less likely degenerated intracavitary myoma  The study was marked in [facility] for immediate notification  7/20/22 Dr Lynn Reeves  history of postmenopausal bleeding  She had CT scan and ultrasound both reveal a single abnormality in a 3+ cm endometrial stripe  A [place]ip[place] biopsy was performed today  If hyperplasia or malignancy is noted then consideration for hysterectomy would be undertaken    8/3/22 Dr Lynn Reeves  new diagnosis of endometrial carcinosarcoma by her most recent biopsy  This appears to be early stage by clinical exam    at this point to surgical management is her best option       8/18/22 HYSTERECTOMY LAPAROSCOPIC TOTAL ([address]) W/ ROBOTICS, BILATERAL SALPINGO-OOPHORECTOMY, SENTINEL LYMPH NODE MAPPING AND BIOPSY (Abdomen)    8/18/22 pathology  Procedure  Total hysterectomy and bilateral salpingo-oophorectomy    Hysterectomy Type Vaginal    Specimen Integrity  Opened in vaginal canal  Per surgical note, no abdominal contanimation  TUMOR   Tumor Site  Endometrium    Tumor Size  Greatest Dimension (Centimeters): 6 3 (fragmented) cm   Histologic Type  Carcinosarcoma    Two-Tier Grading System  Not applicable    Myometrial Invasion  Present    Depth of Myometrial Invasion  0 mm   Myometrial Thickness  2 8 mm   Percentage of Myometrial Invasion  0 %   Adenomyosis  Not identified    Uterine Serosa Involvement  Not identified    Lower Uterine Segment Involvement  Not identified    Cervical Stromal Involvement  Not identified    Other Tissue / Organ Involvement  Not applicable    Peritoneal / Ascitic Fluid  Not submitted / unknown    Lymphovascular Invasion (LVI)  Not identified    MARGINS   Margin Status  All margins negative for invasive carcinoma    Closest Margin(s) to Invasive Carcinoma  Cannot be determined: Fragmented    REGIONAL LYMPH NODES   Regional Lymph Node Status  All regional lymph nodes negative for tumor cells    Lymph Nodes Examined     Total Number of Pelvic Nodes Examined  3    Number of Pelvic [place] Nodes Examined  3    Total Number of Para-aortic Nodes Examined  0    PATHOLOGIC STAGE CLASSIFICATION (pTNM, AJCC 8th Edition)   Reporting of pT, pN, and (when applicable) pM categories is based on information available to the pathologist at the time the report is issued  As per the AJCC (Chapter 1, 8th Ed ) it is the managing physician’s responsibility to establish the final pathologic stage based upon all pertinent information, including but potentially not limited to this pathology report  pT Category  pT1a    Regional Lymph Nodes Modifier  (sn)    pN Category  pN0    FIGO STAGE   FIGO Stage  IA           Antibody            Clone                 Description                                  Results  MLH1                 M1                     Mismatch repair protein  Intact nuclear expression              MS2 [id number]        Mismatch repair protein  Intact nuclear expression  MSH6                44                      Mismatch repair protein  Intact nuclear expression  PMS2                 [id number]           Mismatch repair protein  Intact nuclear expression      22 Dr Jacky Marie  recommendation of 6 cycles of carboplatin area under the curve of 6 paclitaxel 175 mg meter squared followed by vaginal brachytherapy  recommend initiating chemotherapy sometime in the next few weeks  22 IR port placement    22 Dr Jacky Marie  Due to carcinosarcoma subtype and elevated  the PET scan will be performed after completion of chemotherapy  This will be scheduled today  22 PET CT   1  Diffusely increased marrow activity likely is related to recent therapy  2   No hypermetabolic metastases visualized  22 Gyn Onc, Yasmin Linda  recent PET done on 22 that showed no metastatic disease  proceed with cycle 6 of treatment  refer her to radiation oncology    23 Completed chemotherapy    Component    Latest Ref Rng & Units 0 0 - 30 0 U/mL   2022 84 9 (H)   2022 85 4 (H)   2022 67 6 (H)   10/27/2022 45 5 (H)   2022 37 3 (H)   2022 13 1   2022 15 2         Upcomin23 Dr Jacky Marie            Oncology History   Uterine carcinosarcoma ([facility] [address] )   2022 Biopsy    Endometrium, EMB:  - Markedly atypical glandular and stromal elements, suspicious for carcinosarcoma  See comment  Comment: There are superficial strips of atypical glandular elements which are diffusely positive for p53 by immunohistochemistry  Additionally, the the stromal elements demonstrate significant nuclear pleomorphism  The overall findings are suspicious for carcinosarcoma  2022 Initial Diagnosis    Uterine carcinosarcoma ([facility] [address] )     2022 -  Cancer Staged    Staging form: Corpus Uteri - Carcinoma, AJCC 8th Edition  - Clinical stage from 2022:  FIGO Stage IA (cT1a, cN0, cM0) - Signed by Amada Killian MD on 8/26/2022  Histopathologic type: Carcinosarcoma  Stage prefix: Initial diagnosis  Histologic grade (G): G3  Histologic grading system: 3 grade system       8/2022 Surgery    Patient and did went robotic hysterectomy bilateral salpingo-oophorectomy sentinel lymph node mapping and biopsy for stage I A non invasive carcinosarcoma of the endometrium  The uterus did fracture within the vagina but no leakage into the abdomen was noted  9/19/2022 -  Chemotherapy    palonosetron (ALOXI), 0 25 mg, Intravenous, Once, 6 of 6 cycles  Administration: 0 25 mg (9/19/2022), 0 25 mg (10/10/2022), 0 25 mg (10/31/2022), 0 25 mg (11/21/2022), 0 25 mg (12/12/2022), 0 25 mg (1/3/2023)  pegfilgrastim (NEULASTA), 6 mg, Subcutaneous, Once, 5 of 5 cycles  Administration: 6 mg (10/11/2022), 6 mg (11/1/2022), 6 mg (11/22/2022), 6 mg (12/13/2022), 6 mg (1/4/2023)  fosaprepitant (EMEND) IVPB, 150 mg, Intravenous, Once, 6 of 6 cycles  Administration: 150 mg (9/19/2022), 150 mg (10/10/2022), 150 mg (10/31/2022), 150 mg (11/21/2022), 150 mg (12/12/2022), 150 mg (1/3/2023)  CARBOplatin (PARAPLATIN) IVPB (GOG AUC DOSING), 572 4 mg, Intravenous, Once, 6 of 6 cycles  Administration: 572 4 mg (9/19/2022), 579 6 mg (10/10/2022), 579 6 mg (10/31/2022), 575 4 mg (11/21/2022), 564 mg (12/12/2022), 564 mg (1/3/2023)  PACLItaxel (TAXOL) chemo IVPB, 175 mg/m2 = 274 8 mg, Intravenous, Once, 6 of 6 cycles  Administration: 274 8 mg (9/19/2022), 278 4 mg (10/10/2022), 278 4 mg (10/31/2022), 276 6 mg (11/21/2022), 273 mg (12/12/2022), 273 mg (1/3/2023)         Review of Systems:  Review of Systems   Constitutional: Negative  HENT: Negative  Eyes:        Wears glasses   Respiratory: Negative  Cardiovascular: Negative  Gastrointestinal: Positive for constipation  Genitourinary: Negative  Negative for frequency, pelvic pain, urgency, vaginal bleeding, vaginal discharge and vaginal pain     Musculoskeletal: Negative  Skin: Negative  Allergic/Immunologic: Positive for immunocompromised state  Neurological: Negative  Hematological: Negative  Psychiatric/Behavioral: Negative  Clinical Trial: no    OB/GYN History:  The patient underwent menarche at 13 years  Menopause Status Pre, Kasia, Unknown and No Answer  No LMP recorded  Patient has had a hysterectomy  Menopause at 37} years  Menopause Reason Depo- provera for many years  Hormone replacement therapy: no      2   Para 2   Age at first delivery being 21 years  Nursing: no    Birth control pills: yes    Years used 10    Pregnancy test needed:  no    ONCOTYPE/MAMMOPRINT results N/A    PFT N/A    Prior Radiation no    Teaching NIH book, side effects, SIM    MST completed    Implantable Devices (Port, Pacemaker, pain stimulator) right chest port    Hip Replacement no      [unfilled]  Health Maintenance   Topic Date Due   • Hepatitis B Vaccine (1 of 3 - 3-dose series) Never done   • Colorectal Cancer Screening  Never done   • Osteoporosis Screening  Never done   • Breast Cancer Screening: Mammogram  08/15/2020   • COVID-19 Vaccine (4 - Booster for Moderna series) 2022   • Influenza Vaccine (1) 2022   • Fall Risk  2023   • Depression Screening  2023   • Medicare Annual Wellness Visit (AWV)  2023   • Urinary Incontinence Screening  2023   • BMI: Adult  2024   • Hepatitis C Screening  Completed   • Pneumococcal Vaccine: 65+ Years  Completed   • HIB Vaccine  Aged Out   • IPV Vaccine  Aged Out   • Hepatitis A Vaccine  Aged Out   • Meningococcal ACWY Vaccine  Aged Out   • HPV Vaccine  Aged Out     Past Medical History:   Diagnosis Date   • Anxiety    • Hypercholesterolemia      Past Surgical History:   Procedure Laterality Date   • COLONOSCOPY      complete    • DILATION AND CURETTAGE OF UTERUS      x2    • FL GUIDED CENTRAL VENOUS ACCESS DEVICE INSERTION  2022   • MO INSJ TUNNELED CTR VAD W/SUBQ PORT AGE 5 YR/> N/A 9/6/2022    Procedure: INSERTION VENOUS PORT ( PORT-A-CATH) IR;  Surgeon: Cole Lange DO;  Location: [facility];  Service: Interventional Radiology   • LA LAPAROSCOPY W TOT HYSTERECTUTERUS <=250 GRAM  W TUBE/OVARY N/A 8/18/2022    Procedure: HYSTERECTOMY LAPAROSCOPIC TOTAL ([address]) W/ ROBOTICS, BILATERAL SALPINGO-OOPHORECTOMY, SENTINEL LYMPH NODE MAPPING AND BIOPSY;  Surgeon: Cassie Pink MD;  Location: [facility] MA[facility];  Service: Gynecology Oncology     Family History   Problem Relation Age of Onset   • Lung cancer Father    • Brain cancer Father    • Arthritis Family    • Hypertension Family         essential     • Hyperlipidemia Family    • Breast cancer Cousin    • Endometrial cancer Neg Hx    • Ovarian cancer Neg Hx    • Colon cancer Neg Hx      Social History     Tobacco Use   • Smoking status: Never   • Smokeless tobacco: Never   Vaping Use   • Vaping Use: Never used   Substance Use Topics   • Alcohol use: No   • Drug use: No        Current Outpatient Medications:   •  cholecalciferol (VITAMIN D3) 1,000 units tablet, Take 1,000 Units by mouth daily, Disp: , Rfl:   •  cyanocobalamin (VITAMIN B-12) 100 MCG tablet, Take 100 mcg by mouth daily, Disp: , Rfl:   •  escitalopram (LEXAPRO) 10 mg tablet, TAKE 1 TABLET (10 MG TOTAL) BY MOUTH DAILY, Disp: 90 tablet, Rfl: 3  •  loratadine-pseudoephedrine (CLARITIN-D 24-HOUR)  mg per 24 hr tablet, Take 1 tablet by mouth daily, Disp: , Rfl:   •  LORazepam (ATIVAN) 1 mg tablet, Take 1 tablet (1 mg total) by mouth every 6 (six) hours as needed for anxiety (or nausea), Disp: 36 tablet, Rfl: 1  •  Multiple Vitamins-Minerals (multivitamin with minerals) tablet, Take 1 tablet by mouth daily, Disp: , Rfl:   •  Multiple Vitamins-Minerals (OCUVITE PO), Take by mouth, Disp: , Rfl:   •  Multiple Vitamins-Minerals (OCUVITE PO), Take by mouth in the morning, Disp: , Rfl:   •  naloxone (NARCAN) 4 mg/0 1 mL nasal spray, Administer 1 spray into a nostril   If no response after 2-3 minutes, give another dose in the other nostril using a new spray , Disp: 1 each, Rfl: 1  •  ondansetron (ZOFRAN) 8 mg tablet, TAKE 1 TABLET (8 MG TOTAL) BY MOUTH EVERY 8 (EIGHT) HOURS AS NEEDED FOR NAUSEA OR VOMITING, Disp: 30 tablet, Rfl: 1  •  rosuvastatin (CRESTOR) 5 mg tablet, TAKE 1 TABLET (5 MG TOTAL) BY MOUTH DAILY, Disp: 90 tablet, Rfl: 3  Allergies   Allergen Reactions   • Acetazolamide    • Sulfa Antibiotics Facial Swelling     Redness itching      There were no vitals filed for this visit

## 2023-01-09 NOTE — LETTER
January 10, 2023     [address]  [facility]    Patient: Darryle Rod   YOB: 1955   Date of Visit: 2023       Dear Dr Cori Simpson: Thank you for referring Audrey Durant to me for evaluation  Below are my notes for this consultation  If you have questions, please do not hesitate to call me  I look forward to following your patient along with you  Sincerely,        Zaira Mccabe MD        CC: MD Zaira Torres MD  1/10/2023 11:28 AM  Sign when Signing Visit  Consultation - Radiation Oncology      LIK:[id number] : 1955  Encounter: [id number]  Patient Information: [address]  Chief Complaint   Patient presents with   • Uterine Cancer   • Consult     Cancer Staging   Uterine carcinosarcoma [facility])  Staging form: Corpus Uteri - Carcinoma, AJCC 8th Edition  - Clinical stage from 2022: FIGO Stage IA (cT1a, cN0, cM0) - Signed by Maria Ines Farley MD on 2022  Histopathologic type: Carcinosarcoma, NOS  Stage prefix: Initial diagnosis  Histologic grade (G): G3  Histologic grading system: 3 grade system           History of Present Illness   Darryle Rod is a 77 y o   female with a 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  She presents for consideration for adjuvant radiation today       Briefly, the patient presented to the ED with postmenopausal bleeding in 22 CT A/P wo contrast   Abnormal appearance of the uterus as described above   Correlate for endometrial hyperplasia versus endometrial neoplasm   Recommend follow-up GYN oncology consultation and/or routine pelvic ultrasound    Colonic diverticulosis  Large hiatal hernia/partially intrathoracic stomach   If warranted, consider follow-up GI and/or surgical consultation      22 US pelvis complete with transvaginal  Abnormally expanded endometrial cavity containing heterogeneous mass with internal vascularity and blood products   Differential diagnosis includes endometrial neoplasm, endometrial hyperplasia versus less likely degenerated intracavitary myoma  She was referred to GYN Oncology for consultation:   7/20/22 Dr Baldev Zaragoza  history of postmenopausal bleeding   She had CT scan and ultrasound both reveal a single abnormality in a 3+ cm endometrial stripe  A Pipelle biopsy was performed today    If hyperplasia or malignancy is noted then consideration for hysterectomy would be undertaken    7/20/22  Pathology: Markedly atypical glandular and stromal elements, suspicious for carcinosarcoma     8/3/22 Dr Baldev Zaragoza  new diagnosis of endometrial carcinosarcoma by her most recent biopsy   This appears to be early stage by clinical exam    at this point to surgical management is her best option       8/18/22 HYSTERECTOMY LAPAROSCOPIC TOTAL ([address]) W/ ROBOTICS, BILATERAL SALPINGO-OOPHORECTOMY, SENTINEL LYMPH NODE MAPPING AND BIOPSY (Abdomen)  Of note, operative notes indicated that the uterus split in 2 during removal through the vagina  Operative notes indicate no abdominal contamination  Pathology assessment/staging limited due to tumor and uterus submission in sections  Pathology demonstrated a 6 3cm tumor, carcinosarcoma associated with myometrial invasion on specimen, but depth could not be assessed as it was a separate specimen submitted  Margins were negative  0/3 sentinel lymph nodes demonstrated metastases  Pelvic washings were negative  [facility] ([facility]) repair genes were intact       The patient tolerated surgery well without significant complications  Adjuvant chemotherapy was initiated with carboplatin and paclitaxel  9/19/22-1/4/23 Completed 6 cycles of chemotherapy  Per patient, tolerated treatment well without need for dose reduction or significant dose delay    She received Neulasta injection last week with final chemotherapy as well      12/7/22 Dr Baldev Zaragoza  Due to carcinosarcoma subtype and elevated  the PET scan will be performed after completion of chemotherapy   This will be scheduled today      12/20/22 PET CT   1 Diffusely increased marrow activity likely is related to recent therapy  2   No hypermetabolic metastases visualized         Component    Latest Ref Rng & Units 0 0 - 30 0 U/mL   9/16/2022 84 9 (H)   9/22/2022 85 4 (H)   9/29/2022 67 6 (H)   10/27/2022 45 5 (H)   11/17/2022 37 3 (H)   12/8/2022 13 1   12/29/2022 15 2         Currently, the patient notes fatigue and some constipation with discomfort with defecation  She notes that this happened with each cycle of chemotherapy and resolved after about 1 week  She notes occasional streaks of BRBPR with straining during defecation  None otherwise  She denies diarrhea  She denies vaginal discharge of bleeding  She denies vaginal irritation  She denies abdominal or pelvic cramping  She denies significant urinary symptoms including dysuria, hematuria, or incontinence  The patient was discussed in multidisciplinary GYN tumor board and adjuvant chemotherapy and vaginal brachytherapy were recommended at that time  Historical Information   Oncology History   Uterine carcinosarcoma ([facility] [address] )   7/20/2022 Biopsy    Endometrium, EMB:  - Markedly atypical glandular and stromal elements, suspicious for carcinosarcoma  See comment  Comment: There are superficial strips of atypical glandular elements which are diffusely positive for p53 by immunohistochemistry  Additionally, the the stromal elements demonstrate significant nuclear pleomorphism  The overall findings are suspicious for carcinosarcoma  8/18/2022 Initial Diagnosis    Uterine carcinosarcoma ([facility] [address] )     8/26/2022 -  Cancer Staged    Staging form: Corpus Uteri - Carcinoma, AJCC 8th Edition  - Clinical stage from 8/26/2022:  FIGO Stage IA (cT1a, cN0, cM0) - Signed by Lenin Weber MD on 8/26/2022  Histopathologic type: Carcinosarcoma  Stage prefix: Initial diagnosis  Histologic grade (G): G3  Histologic grading system: 3 grade system       8/2022 Surgery    Patient and did went robotic hysterectomy bilateral salpingo-oophorectomy sentinel lymph node mapping and biopsy for stage I A non invasive carcinosarcoma of the endometrium  The uterus did fracture within the vagina but no leakage into the abdomen was noted       9/19/2022 -  Chemotherapy    palonosetron (ALOXI), 0 25 mg, Intravenous, Once, 6 of 6 cycles  Administration: 0 25 mg (9/19/2022), 0 25 mg (10/10/2022), 0 25 mg (10/31/2022), 0 25 mg (11/21/2022), 0 25 mg (12/12/2022), 0 25 mg (1/3/2023)  pegfilgrastim (NEULASTA), 6 mg, Subcutaneous, Once, 5 of 5 cycles  Administration: 6 mg (10/11/2022), 6 mg (11/1/2022), 6 mg (11/22/2022), 6 mg (12/13/2022), 6 mg (1/4/2023)  fosaprepitant (EMEND) IVPB, 150 mg, Intravenous, Once, 6 of 6 cycles  Administration: 150 mg (9/19/2022), 150 mg (10/10/2022), 150 mg (10/31/2022), 150 mg (11/21/2022), 150 mg (12/12/2022), 150 mg (1/3/2023)  CARBOplatin (PARAPLATIN) IVPB (GOG AUC DOSING), 572 4 mg, Intravenous, Once, 6 of 6 cycles  Administration: 572 4 mg (9/19/2022), 579 6 mg (10/10/2022), 579 6 mg (10/31/2022), 575 4 mg (11/21/2022), 564 mg (12/12/2022), 564 mg (1/3/2023)  PACLItaxel (TAXOL) chemo IVPB, 175 mg/m2 = 274 8 mg, Intravenous, Once, 6 of 6 cycles  Administration: 274 8 mg (9/19/2022), 278 4 mg (10/10/2022), 278 4 mg (10/31/2022), 276 6 mg (11/21/2022), 273 mg (12/12/2022), 273 mg (1/3/2023)           Past Medical History:   Diagnosis Date   • Anxiety    • Hypercholesterolemia    • Uterine cancer [facility])      Past Surgical History:   Procedure Laterality Date   • COLONOSCOPY      complete    • DILATION AND CURETTAGE OF UTERUS      x2    • FL GUIDED CENTRAL VENOUS ACCESS DEVICE INSERTION  9/6/2022   • OR INSJ TUNNELED CTR VAD W/SUBQ PORT AGE 5 YR/> N/A 9/6/2022    Procedure: INSERTION VENOUS PORT ( PORT-A-CATH) IR;  Surgeon: Mery Bansal Federica Espino DO;  Location: [facility] MAIN OR;  Service: Interventional Radiology   • NV LAPS TOTAL HYSTERECT 250 GM/< W/RMVL TUBE/OVARY N/A 8/18/2022    Procedure: HYSTERECTOMY LAPAROSCOPIC TOTAL ([address]) W/ ROBOTICS, BILATERAL SALPINGO-OOPHORECTOMY, SENTINEL LYMPH NODE MAPPING AND BIOPSY;  Surgeon: Petey De Santiago MD;  Location: [facility] MAIN OR;  Service: Gynecology Oncology       Family History   Problem Relation Age of Onset   • Lung cancer Father    • Brain cancer Father    • Arthritis Family    • Hypertension Family         essential     • Hyperlipidemia Family    • Breast cancer Cousin    • Endometrial cancer Neg Hx    • Ovarian cancer Neg Hx    • Colon cancer Neg Hx        Social History   Social History     Substance and Sexual Activity   Alcohol Use No     Social History     Substance and Sexual Activity   Drug Use No     Social History     Tobacco Use   Smoking Status Never   Smokeless Tobacco Never         Meds/Allergies      Current Outpatient Medications:   •  cholecalciferol (VITAMIN D3) 1,000 units tablet, Take 1,000 Units by mouth daily, Disp: , Rfl:   •  cyanocobalamin (VITAMIN B-12) 100 MCG tablet, Take 100 mcg by mouth daily, Disp: , Rfl:   •  escitalopram (LEXAPRO) 10 mg tablet, TAKE 1 TABLET (10 MG TOTAL) BY MOUTH DAILY, Disp: 90 tablet, Rfl: 3  •  loratadine-pseudoephedrine (CLARITIN-D 24-HOUR)  mg per 24 hr tablet, Take 1 tablet by mouth daily, Disp: , Rfl:   •  Multiple Vitamins-Minerals (OCUVITE PO), Take by mouth, Disp: , Rfl:   •  ondansetron (ZOFRAN) 8 mg tablet, TAKE 1 TABLET (8 MG TOTAL) BY MOUTH EVERY 8 (EIGHT) HOURS AS NEEDED FOR NAUSEA OR VOMITING, Disp: 30 tablet, Rfl: 1  •  rosuvastatin (CRESTOR) 5 mg tablet, TAKE 1 TABLET (5 MG TOTAL) BY MOUTH DAILY, Disp: 90 tablet, Rfl: 3  •  LORazepam (ATIVAN) 1 mg tablet, Take 1 tablet (1 mg total) by mouth every 6 (six) hours as needed for anxiety (or nausea) (Patient not taking: Reported on 1/9/2023), Disp: 36 tablet, Rfl: 1  •  Multiple Vitamins-Minerals (multivitamin with minerals) tablet, Take 1 tablet by mouth daily (Patient not taking: Reported on 2023), Disp: , Rfl:   •  Multiple Vitamins-Minerals (OCUVITE PO), Take by mouth in the morning, Disp: , Rfl:   •  naloxone (NARCAN) 4 mg/0 1 mL nasal spray, Administer 1 spray into a nostril  If no response after 2-3 minutes, give another dose in the other nostril using a new spray  (Patient not taking: Reported on 2023), Disp: 1 each, Rfl: 1  Allergies   Allergen Reactions   • Acetazolamide    • Sulfa Antibiotics Facial Swelling     Redness itching       Review of Systems   Constitutional: Negative  HENT: Negative  Eyes:        Wears glasses   Respiratory: Negative  Cardiovascular: Negative  Gastrointestinal: Positive for constipation  Genitourinary: Negative  Negative for frequency, pelvic pain, urgency, vaginal bleeding, vaginal discharge and vaginal pain  Musculoskeletal: Negative  Skin: Negative  Allergic/Immunologic: Positive for immunocompromised state  Neurological: Negative  Hematological: Negative  Psychiatric/Behavioral: Negative           OB/GYN History:  The patient underwent menarche at 13 years  Menopause Status Pre, Kasia, Unknown and No Answer  No LMP recorded  Patient has had a hysterectomy  Menopause at 37} years  Menopause Reason Depo- provera for many years  Hormone replacement therapy: no      2   Para 2   Age at first delivery being 21 years  Nursing: no    Birth control pills: yes  Years used 10         OBJECTIVE:   /76   Pulse (!) 106   Temp 97 5 °F (36 4 °C)   Resp 16   Wt 56 2 kg (124 lb)   SpO2 99%   BMI 23 20 kg/m²   Performance Status: Karnofsky: 80 - Normal activity with effort; some signs or symptoms of disease    Physical Exam  Vitals and nursing note reviewed  Constitutional:       General: She is not in acute distress  Appearance: She is well-developed     Cardiovascular:      Rate and Rhythm: Normal rate and regular rhythm  Pulmonary:      Breath sounds: No wheezing, rhonchi or rales  Abdominal:      General: There is no distension  Palpations: Abdomen is soft  Tenderness: There is no abdominal tenderness  Comments: Well-healed port wounds  Genitourinary:     Comments: GYN examination demonstrates normal female external genitalia  There is some redundancy of the labia majora inferiorly, but no suspicious skin lesions, rashes, or palpable nodules  There is significant foreshortening of the vaginal cuff  There are no adhesions or stenosis appreciated  The vaginal cuff is closed  There are no palpable nodules  Speculum examination demonstrates the cuff is well-healed and there are no suspicious lesions of the vaginal cuff  The patient does have patchy moist desquamation and skin changes of the perianal region  Musculoskeletal:      Right lower leg: No edema  Left lower leg: No edema  Lymphadenopathy:      Cervical: No cervical adenopathy  Upper Body:      Right upper body: No supraclavicular adenopathy  Left upper body: No supraclavicular adenopathy  Lower Body: No right inguinal adenopathy  No left inguinal adenopathy  Neurological:      Mental Status: She is alert and oriented to person, place, and time  Gait: Gait normal           RESULTS    Imaging Studies  NM PET CT skull base to mid thigh    Result Date: 12/20/2022  Narrative: PET/CT SCAN INDICATION: C55: Malignant neoplasm of uterus, part unspecified C54 1: Malignant neoplasm of endometrium , restaging postchemotherapy, status post SALLY/BSO 8/18/2022 MODIFIER: PS COMPARISON: CT 6/26/2022 CELL TYPE:  Endometrial carcinosarcoma, endometrial biopsy 8/18/2022 TECHNIQUE:   8 3 mCi F-18-FDG administered IV   Multiplanar attenuation corrected and non attenuation corrected PET images are available for interpretation, and contiguous, low dose, axial CT sections were obtained from the vertex through the femurs following the administration of oral contrast material (7 5 cc Omnipaque-240 in 300 cc water)  Intravenous contrast material was not utilized  This examination, like all CT scans performed in the [facility], was performed utilizing techniques to minimize radiation dose exposure, including the use of iterative reconstruction and automated exposure control  Fasting serum glucose: 87 mg/dl FINDINGS: VISUALIZED BRAIN: Asymmetrically decreased activity in the right hemisphere as compared to the left  No acute abnormalities are seen  HEAD/NECK:   There is a physiologic distribution of FDG  No FDG avid cervical adenopathy is seen  CT images: Unremarkable  CHEST:   No FDG avid soft tissue lesions are seen  CT images: Right chest wall port  Coronary artery calcifications  Large hiatal hernia  ABDOMEN:   No FDG avid soft tissue lesions are seen  No hypermetabolic adenopathy  CT images: Unremarkable  PELVIS: No FDG avid soft tissue lesions are seen  No hypermetabolic adenopathy  CT images: Hysterectomy  OSSEOUS STRUCTURES: Diffusely increased marrow activity likely related to recent therapy  No focal FDG avid lesions are seen  CT images: No significant findings  Impression: 1  Diffusely increased marrow activity likely is related to recent therapy  2   No hypermetabolic metastases visualized  Workstation performed: [id number]       Pathology:   Collected 8/18/2022 13:36      Status: Final result      Visible to patient: Yes (seen)      Dx:  Uterine carcinosarcoma [facility])      0 Result Notes  Component    Case Report   Surgical Pathology Report                         Case: [id number]                                    Authorizing Provider: Nasreen Blake MD       Collected:           08/18/2022 1336               Ordering Location:     [address] [place] [address]      Received:            08/18/2022 2242                                      Hospital Operating Room                                                       Pathologist:           Courtney Hough MD                                                            Specimens:   A) - Lymph Node, [place], right pelvic                                                              B) - Lymph Node, [place], left pelvic                                                               C) - Uterus w/Bilateral Ovaries and Fallopian Tubes, and cervix                            Final Diagnosis   A  Lymph node, "Right pelvic sentinel lymph node," Resection:  - One lymph node, negative for malignancy, confirmed with immunohistochemistry (0/1)     B  Lymph node, "Reft pelvic sentinel lymph node," Resection:  - Two lymph nodes, negative for malignancy, confirmed with immunohistochemistry (0/2)     C  A  Uterus, cervix, and bilateral fallopian tubes, "Uterus with bilateral ovaries and fallopian tubes," Hysterectomy and salpingo-oophorectomy:  - Uterus, 133 grams  -- Cervix:   - Cervical squamous and glandular mucosa, negative for dysplasia, supported by p16/Ki-67 immunohistochemistry   -- Endometrium:   - Carcinosarcoma (Epithelial component serous type)  - Fragmented, largest dimension 6 3 cm  - No myometrial invasion identified  -- Myometrium:   - Leiomyomata   - No definite invasion identified  -- Serosa:   - No pathologic abnormality  -- Ovary, Left               - Benign ovary with physiologic change  -- Ovary, Right               - Benign ovary with physiologic change  -- Fallopian tube, Left  - Complete cross section of fallopian tube with no diagnostic abnormality  - Negative for malignancy  -- Fallopian tube, Right               - Complete cross section of fallopian tube with no diagnostic abnormality               - Negative for malignancy   Electronically signed by Courtney Hough MD on 8/29/2022 at 5126   Comments: This is an appended report  These results have been appended to a previously preliminary verified report        Preliminary Diagnosis                    Preliminary result electronically signed by John Bishop MD on 8/26/2022 at [phone]   Note    Intradepartmental consultation in agreement (DD)     Immunohistochemistry was performed on block C21, C24, and C21 with adequate controls  The epithelial component is positive for AE1/AE3, p53, and p16  The stromal component is negative for AE1/AE3           MISMATCH REPAIR     Interpretation: No loss of nuclear expression of MMR proteins: Low probability of MSI-H     Results:  Antibody            Clone                 Description                                  Results  MLH1                 M1                     Mismatch repair protein  Intact nuclear expression              MS2                   [id number]        Mismatch repair protein  Intact nuclear expression  MSH6                40                      Mismatch repair protein  Intact nuclear expression  PMS2                 [id number]           Mismatch repair protein  Intact nuclear expression     Comment: background non-neoplastic tissue and/or internal control with intact nuclear expression  A positive control for each antibody has been reviewed and accepted       These immunohistochemical tests were performed at [facility] in [facility] and interpreted by Dr Codie Arvizu  An electronic copy of this report will be kept on file in the Medical Records Department at [facility]             HER2/DMITRIY SOLID TUMOR ANALYSIS     Results:  Antibody                         Clone                 Description                                  Results  Her2/dmitriy by IHC             4B5                    c-erb-2 oncoprotein                     0/Negative     These immunohistochemical tests were performed at [facility] in [facility] and interpreted by Dr Codie Arvizu  An electronic copy of this report will be kept on file in the Medical Records Department at [facility]     Comment: This is an appended report   These results have been appended to a previously preliminary verified report  Additional Information    All reported additional testing was performed with appropriately reactive controls   These tests were developed and their performance characteristics determined by [facility] or appropriate performing facility, though some tests may be performed on tissues which have not been validated for performance characteristics (such as staining performed on alcohol exposed cell blocks and decalcified tissues)   Results should be interpreted with caution and in the context of the patients’ clinical condition  These tests may not be cleared or approved by the [place]  Food and Drug Administration, though the FDA has determined that such clearance or approval is not necessary  These tests are used for clinical purposes and they should not be regarded as investigational or for research  This laboratory has been approved by [facility], designated as a high-complexity laboratory and is qualified to perform these tests   Interpretation performed at [facility], [address]      Synoptic Checklist   ENDOMETRIUM  8th Edition - Protocol posted: 12/17/2021  ENDOMETRIUM: HYSTERECTOMY - All Specimens  SPECIMEN   Procedure  Total hysterectomy and bilateral salpingo-oophorectomy    Hysterectomy Type  Vaginal    Specimen Integrity  Opened in vaginal canal  Per surgical note, no abdominal contanimation      TUMOR   Tumor Site  Endometrium    Tumor Size  Greatest Dimension (Centimeters): 6 3 (fragmented) cm   Histologic Type  Carcinosarcoma    Two-Tier Grading System  Not applicable    Myometrial Invasion  Present    Depth of Myometrial Invasion  0 mm   Myometrial Thickness  2 8 mm   Percentage of Myometrial Invasion  0 %   Adenomyosis  Not identified    Uterine Serosa Involvement  Not identified    Lower Uterine Segment Involvement  Not identified    Cervical Stromal Involvement  Not identified    Other Tissue / Organ Involvement  Not applicable    Peritoneal / Ascitic Fluid  Not submitted / unknown    Lymphovascular Invasion (LVI)  Not identified    MARGINS   Margin Status  All margins negative for invasive carcinoma    Closest Margin(s) to Invasive Carcinoma  Cannot be determined: Fragmented    REGIONAL LYMPH NODES   Regional Lymph Node Status  All regional lymph nodes negative for tumor cells    Lymph Nodes Examined     Total Number of Pelvic Nodes Examined  3    Number of Pelvic [place] Nodes Examined  3    Total Number of Para-aortic Nodes Examined  0    PATHOLOGIC STAGE CLASSIFICATION (pTNM, AJCC 8th Edition)   Reporting of pT, pN, and (when applicable) pM categories is based on information available to the pathologist at the time the report is issued  As per the AJCC (Chapter 1, 8th Ed ) it is the managing physician’s responsibility to establish the final pathologic stage based upon all pertinent information, including but potentially not limited to this pathology report  pT Category  pT1a    Regional Lymph Nodes Modifier  (sn)    pN Category  pN0    FIGO STAGE   FIGO Stage  IA      ASSESSMENT  1  Malignant neoplasm of endometrium ([facility] [address] )        2  Uterine carcinosarcoma [facility])  Ambulatory referral to Radiation Oncology        Cancer Staging   Uterine carcinosarcoma [facility])  Staging form: Corpus Uteri - Carcinoma, AJCC 8th Edition  - Clinical stage from 8/26/2022: FIGO Stage IA (cT1a, cN0, cM0) - Signed by Nilsa Beckett MD on 8/26/2022  Histopathologic type: Carcinosarcoma, NOS  Stage prefix: Initial diagnosis  Histologic grade (G): G3  Histologic grading system: 3 grade system        PLAN/DISCUSSION  Tye Martini is a 77 y o   female who was recently diagnosed with stage Ia carcinosarcoma of the uterus status postresection  She completed adjuvant chemotherapy approximately 1 week ago, which she tolerated well  She presents for consideration for adjuvant HDR brachytherapy to the vaginal cuff      I agree with the recommendation for adjuvant vaginal brachytherapy  We would plan for a total dose of 21-30 Gray in 3-5 fractions  I feel that this would offer the patient benefit in terms of local control and possible disease-free survival   Given uterine rupture in the vaginal canal during resection of carcinosarcoma and significant foreshortening post surgery, I would consider treating majority of the vagina  This is in accordance with NCCN guidelines  The rationale and potential benefits, as well as the risks and acute and late side effects and potential toxicities of radiation were discussed with the patient at length  Side effects discussed included, but were not limited to: Vaginal irritation, vaginal dryness, vaginal stenosis, rectal or anal irritation, diarrhea, irritative urinary symptoms, injury to bladder, bowel, or rectum  The patient has some desquamation of the anal region at this time  There does not appear to be infection  This may be related to chemotherapy as the patient notes symptoms approximately 1 week after chemotherapy that then resolves  Provided her with foaming cleanser and recommended a barrier cream such as Vaseline or Aquaphor until it heals  The patient was given the opportunity to ask questions and all questions were answered to their satisfaction  She wished to proceed with the recommended treatment plan  CT simulation scheduled in 1 to 2 weeks  We will evaluate full healing prior to beginning radiation treatment  Total Time Spent  42 minutes spent reviewing EMR in preparation for visit, with the patient, coordination with other providers, and documentation  Greater than 50% of total time was spent with the patient and/or family for counseling and/or coordination of care  Ori Ruelas MD  1/9/2023,5:10 PM      Portions of the record may have been created with voice recognition software    Occasional wrong word or "sound a like" substitutions may have occurred due to the inherent limitations of voice recognition software  Read the chart carefully and recognize, using context, where substitutions have occurred

## 2023-01-12 ENCOUNTER — APPOINTMENT (OUTPATIENT)
Dept: LAB | Facility: HOSPITAL | Age: 68
End: 2023-01-12

## 2023-01-12 DIAGNOSIS — C55 UTERINE CARCINOSARCOMA (HCC): ICD-10-CM

## 2023-01-12 LAB
ALBUMIN SERPL BCP-MCNC: 3.8 G/DL (ref 3.5–5)
ALP SERPL-CCNC: 115 U/L (ref 46–116)
ALT SERPL W P-5'-P-CCNC: 28 U/L (ref 12–78)
ANION GAP SERPL CALCULATED.3IONS-SCNC: 9 MMOL/L (ref 4–13)
ANISOCYTOSIS BLD QL SMEAR: PRESENT
AST SERPL W P-5'-P-CCNC: 23 U/L (ref 5–45)
BASOPHILS # BLD MANUAL: 0 THOUSAND/UL (ref 0–0.1)
BASOPHILS NFR MAR MANUAL: 0 % (ref 0–1)
BILIRUB SERPL-MCNC: 0.21 MG/DL (ref 0.2–1)
BUN SERPL-MCNC: 13 MG/DL (ref 5–25)
CALCIUM SERPL-MCNC: 8.7 MG/DL (ref 8.3–10.1)
CHLORIDE SERPL-SCNC: 106 MMOL/L (ref 96–108)
CO2 SERPL-SCNC: 28 MMOL/L (ref 21–32)
CREAT SERPL-MCNC: 0.6 MG/DL (ref 0.6–1.3)
EOSINOPHIL # BLD MANUAL: 0.2 THOUSAND/UL (ref 0–0.4)
EOSINOPHIL NFR BLD MANUAL: 1 % (ref 0–6)
ERYTHROCYTE [DISTWIDTH] IN BLOOD BY AUTOMATED COUNT: 14.4 % (ref 11.6–15.1)
GFR SERPL CREATININE-BSD FRML MDRD: 92 ML/MIN/1.73SQ M
GLUCOSE P FAST SERPL-MCNC: 108 MG/DL (ref 65–99)
HCT VFR BLD AUTO: 31.7 % (ref 34.8–46.1)
HGB BLD-MCNC: 9.8 G/DL (ref 11.5–15.4)
HYPERCHROMIA BLD QL SMEAR: PRESENT
LYMPHOCYTES # BLD AUTO: 12 % (ref 14–44)
LYMPHOCYTES # BLD AUTO: 2.38 THOUSAND/UL (ref 0.6–4.47)
MAGNESIUM SERPL-MCNC: 1.6 MG/DL (ref 1.6–2.6)
MCH RBC QN AUTO: 29.8 PG (ref 26.8–34.3)
MCHC RBC AUTO-ENTMCNC: 30.9 G/DL (ref 31.4–37.4)
MCV RBC AUTO: 96 FL (ref 82–98)
MONOCYTES # BLD AUTO: 1.39 THOUSAND/UL (ref 0–1.22)
MONOCYTES NFR BLD: 7 % (ref 4–12)
MYELOCYTES NFR BLD MANUAL: 3 % (ref 0–1)
NEUTROPHILS # BLD MANUAL: 15.07 THOUSAND/UL (ref 1.85–7.62)
NEUTS BAND NFR BLD MANUAL: 7 % (ref 0–8)
NEUTS SEG NFR BLD AUTO: 69 % (ref 43–75)
PLATELET # BLD AUTO: 210 THOUSANDS/UL (ref 149–390)
PLATELET BLD QL SMEAR: ADEQUATE
PMV BLD AUTO: 9.1 FL (ref 8.9–12.7)
POTASSIUM SERPL-SCNC: 3.2 MMOL/L (ref 3.5–5.3)
PROT SERPL-MCNC: 6.3 G/DL (ref 6.4–8.4)
RBC # BLD AUTO: 3.29 MILLION/UL (ref 3.81–5.12)
SODIUM SERPL-SCNC: 143 MMOL/L (ref 135–147)
VARIANT LYMPHS # BLD AUTO: 1 %
WBC # BLD AUTO: 19.83 THOUSAND/UL (ref 4.31–10.16)

## 2023-01-18 ENCOUNTER — OFFICE VISIT (OUTPATIENT)
Dept: GYNECOLOGIC ONCOLOGY | Facility: CLINIC | Age: 68
End: 2023-01-18

## 2023-01-18 ENCOUNTER — APPOINTMENT (OUTPATIENT)
Dept: RADIATION ONCOLOGY | Facility: CLINIC | Age: 68
End: 2023-01-18
Attending: RADIOLOGY

## 2023-01-18 VITALS
WEIGHT: 126.8 LBS | SYSTOLIC BLOOD PRESSURE: 128 MMHG | HEIGHT: 61 IN | OXYGEN SATURATION: 98 % | HEART RATE: 122 BPM | TEMPERATURE: 98.2 F | BODY MASS INDEX: 23.94 KG/M2 | DIASTOLIC BLOOD PRESSURE: 78 MMHG | RESPIRATION RATE: 16 BRPM

## 2023-01-18 DIAGNOSIS — C54.1 MALIGNANT NEOPLASM OF ENDOMETRIUM (HCC): ICD-10-CM

## 2023-01-18 DIAGNOSIS — C55 UTERINE CARCINOSARCOMA (HCC): ICD-10-CM

## 2023-01-18 DIAGNOSIS — C54.9 UTERINE SARCOMA (HCC): Primary | ICD-10-CM

## 2023-01-18 NOTE — ASSESSMENT & PLAN NOTE
Patient is a very pleasant 49-year-old female with a history of stage Ia carcinosarcoma of the endometrium  She has undergone surgery and is completed chemotherapy  She has had some diarrhea which resulted in a fissure of the labial crural fold however this is improving with Aquaphor  The patient is due to start radiation shortly  Upon completion of that the patient would like to return to work sometime in early February  I feel that it was reasonable  Sarmad Douglass have discussed follow-up care and planned follow-up visits every 3 months for 2 years followed by every 6 months for 3 years thereafter  I will order PET CT scan for next visit

## 2023-01-18 NOTE — H&P (VIEW-ONLY)
Assessment/Plan:    Problem List Items Addressed This Visit        Genitourinary    Malignant neoplasm of endometrium ([address] )    Relevant Orders    NM PET CT skull base to mid thigh    Uterine carcinosarcoma ([address] )     Patient is a very pleasant 77-year-old female with a history of stage Ia carcinosarcoma of the endometrium  She has undergone surgery and is completed chemotherapy  She has had some diarrhea which resulted in a fissure of the labial crural fold however this is improving with Aquaphor  The patient is due to start radiation shortly  Upon completion of that the patient would like to return to work sometime in early February  I feel that it was reasonable  Margareth Hernandez have discussed follow-up care and planned follow-up visits every 3 months for 2 years followed by every 6 months for 3 years thereafter  I will order PET CT scan for next visit  Other Visit Diagnoses     Uterine sarcoma ([address] )    -  Primary    Relevant Orders    NM PET CT skull base to mid thigh            CHIEF COMPLAINT: Completion of chemotherapy for stage Ia carcinosarcoma of endometrium      Problem:  Cancer Staging   Uterine carcinosarcoma [facility])  Staging form: Corpus Uteri - Carcinoma, AJCC 8th Edition  - Clinical stage from 8/26/2022: FIGO Stage IA (cT1a, cN0, cM0) - Signed by Sharyle Fried, MD on 8/26/2022        Previous therapy:  Oncology History   Uterine carcinosarcoma ([address] )   7/20/2022 Biopsy    Endometrium, EMB:  - Markedly atypical glandular and stromal elements, suspicious for carcinosarcoma  See comment  Comment: There are superficial strips of atypical glandular elements which are diffusely positive for p53 by immunohistochemistry  Additionally, the the stromal elements demonstrate significant nuclear pleomorphism  The overall findings are suspicious for carcinosarcoma       8/18/2022 Initial Diagnosis    Uterine carcinosarcoma ([address] )     8/26/2022 -  Cancer Staged    Staging form: Corpus Uteri - Carcinoma, AJCC 8th Edition  - Clinical stage from 8/26/2022: FIGO Stage IA (cT1a, cN0, cM0) - Signed by Dariana Mendoza MD on 8/26/2022  Histopathologic type: Carcinosarcoma  Stage prefix: Initial diagnosis  Histologic grade (G): G3  Histologic grading system: 3 grade system       8/2022 Surgery    Patient and did went robotic hysterectomy bilateral salpingo-oophorectomy sentinel lymph node mapping and biopsy for stage I A non invasive carcinosarcoma of the endometrium  The uterus did fracture within the vagina but no leakage into the abdomen was noted  9/19/2022 -  Chemotherapy    palonosetron (ALOXI), 0 25 mg, Intravenous, Once, 6 of 6 cycles  Administration: 0 25 mg (9/19/2022), 0 25 mg (10/10/2022), 0 25 mg (10/31/2022), 0 25 mg (11/21/2022), 0 25 mg (12/12/2022), 0 25 mg (1/3/2023)  pegfilgrastim (NEULASTA), 6 mg, Subcutaneous, Once, 5 of 5 cycles  Administration: 6 mg (10/11/2022), 6 mg (11/1/2022), 6 mg (11/22/2022), 6 mg (12/13/2022), 6 mg (1/4/2023)  fosaprepitant (EMEND) IVPB, 150 mg, Intravenous, Once, 6 of 6 cycles  Administration: 150 mg (9/19/2022), 150 mg (10/10/2022), 150 mg (10/31/2022), 150 mg (11/21/2022), 150 mg (12/12/2022), 150 mg (1/3/2023)  CARBOplatin (PARAPLATIN) IVPB (GOG AUC DOSING), 572 4 mg, Intravenous, Once, 6 of 6 cycles  Administration: 572 4 mg (9/19/2022), 579 6 mg (10/10/2022), 579 6 mg (10/31/2022), 575 4 mg (11/21/2022), 564 mg (12/12/2022), 564 mg (1/3/2023)  PACLItaxel (TAXOL) chemo IVPB, 175 mg/m2 = 274 8 mg, Intravenous, Once, 6 of 6 cycles  Administration: 274 8 mg (9/19/2022), 278 4 mg (10/10/2022), 278 4 mg (10/31/2022), 276 6 mg (11/21/2022), 273 mg (12/12/2022), 273 mg (1/3/2023)           Patient ID: Franchesca Logan is a 77 y o  female  Patient is a very pleasant 77-year-old female with a history of stage Ia carcinosarcoma of the endometrium    She has undergone initial robotically assisted total laparoscopic hysterectomy bilateral salpingo-oophorectomy followed by 6 cycles of carboplatin and paclitaxel therapy  She has tolerated her treatment reasonably well  Follow-up PET CT scan was unremarkable in late December 2022  The patient has subsequently been following up with radiation therapy for vaginal brachytherapy  Treatment has not yet begun but the patient was seen in consultation  Her radiation therapy has been delayed due to some diarrhea after her last chemo and fissure of the labia majora  The patient has been treating this with Aquaphor and this is getting much better  She presents today in follow-up  I reviewed the patient's CBC C  and  data  Her  has normalized to 15  This was as high as 85 at one-point  Today, the patient is doing well  She denies significant abdominal pain, pelvic pain, nausea, vomiting, constipation, diarrhea, fevers, chills, or vaginal bleeding  The following portions of the patient's history were reviewed and updated as appropriate: allergies, current medications, past medical history, past social history, past surgical history and problem list     Review of Systems   Constitutional: Negative  HENT: Negative  Eyes: Negative  Respiratory: Negative  Cardiovascular: Negative  Gastrointestinal: Negative  Endocrine: Negative  Genitourinary: Negative  Musculoskeletal: Negative  Skin: Negative  Neurological: Negative  Hematological: Negative  Psychiatric/Behavioral: Negative          Current Outpatient Medications   Medication Sig Dispense Refill   • cholecalciferol (VITAMIN D3) 1,000 units tablet Take 1,000 Units by mouth daily     • cyanocobalamin (VITAMIN B-12) 100 MCG tablet Take 100 mcg by mouth daily     • escitalopram (LEXAPRO) 10 mg tablet TAKE 1 TABLET (10 MG TOTAL) BY MOUTH DAILY 90 tablet 3   • loratadine-pseudoephedrine (CLARITIN-D 24-HOUR)  mg per 24 hr tablet Take 1 tablet by mouth daily     • Multiple Vitamins-Minerals (OCUVITE PO) Take by mouth     • Multiple Vitamins-Minerals (OCUVITE PO) Take by mouth in the morning     • ondansetron (ZOFRAN) 8 mg tablet TAKE 1 TABLET (8 MG TOTAL) BY MOUTH EVERY 8 (EIGHT) HOURS AS NEEDED FOR NAUSEA OR VOMITING 30 tablet 1   • rosuvastatin (CRESTOR) 5 mg tablet TAKE 1 TABLET (5 MG TOTAL) BY MOUTH DAILY 90 tablet 3   • LORazepam (ATIVAN) 1 mg tablet Take 1 tablet (1 mg total) by mouth every 6 (six) hours as needed for anxiety (or nausea) (Patient not taking: Reported on 1/9/2023) 36 tablet 1   • Multiple Vitamins-Minerals (multivitamin with minerals) tablet Take 1 tablet by mouth daily (Patient not taking: Reported on 1/9/2023)     • naloxone (NARCAN) 4 mg/0 1 mL nasal spray Administer 1 spray into a nostril  If no response after 2-3 minutes, give another dose in the other nostril using a new spray  (Patient not taking: Reported on 1/9/2023) 1 each 1     No current facility-administered medications for this visit  Objective:    Blood pressure 128/78, pulse (!) 122, temperature 98 2 °F (36 8 °C), temperature source Temporal, resp  rate 16, height 5' 1 3" (1 557 m), weight 57 5 kg (126 lb 12 8 oz), SpO2 98 %, not currently breastfeeding  Body mass index is 23 72 kg/m²  Body surface area is 1 56 meters squared  Physical Exam  Constitutional:       Appearance: She is well-developed  HENT:      Head: Normocephalic and atraumatic  Neck:      Thyroid: No thyromegaly  Cardiovascular:      Rate and Rhythm: Normal rate and regular rhythm  Heart sounds: Normal heart sounds  Pulmonary:      Effort: Pulmonary effort is normal       Breath sounds: Normal breath sounds  Abdominal:      General: Bowel sounds are normal       Palpations: Abdomen is soft  Comments: Well healed laparoscopic incisions  Genitourinary:     Comments: -Normal external female genitalia with healing fissure in the left labia majora near the gluteal crural fold, normal Bartholin's and Pinas's glands                  -Normal midline urethral meatus   No lesions notes                  -Bladder without fullness mass or tenderness                  -Vagina without lesion or discharge No significant cystocele or rectocele noted                  -Cervix surgically absent                  -Uterus surgically absent                  -Adnexae surgically absent                  - Anus without fissure of lesion    Musculoskeletal:         General: Normal range of motion  Cervical back: Normal range of motion and neck supple  Lymphadenopathy:      Cervical: No cervical adenopathy  Skin:     General: Skin is warm and dry  Neurological:      Mental Status: She is alert and oriented to person, place, and time     Psychiatric:         Behavior: Behavior normal          Lab Results   Component Value Date     15 2 12/29/2022     Lab Results   Component Value Date    K 3 2 (L) 01/12/2023     01/12/2023    CO2 28 01/12/2023    BUN 13 01/12/2023    CREATININE 0 60 01/12/2023    GLUF 108 (H) 01/12/2023    CALCIUM 8 7 01/12/2023    CORRECTEDCA 9 1 11/23/2022    AST 23 01/12/2023    ALT 28 01/12/2023    ALKPHOS 115 01/12/2023    EGFR 92 01/12/2023     Lab Results   Component Value Date    WBC 19 83 (H) 01/12/2023    HGB 9 8 (L) 01/12/2023    HCT 31 7 (L) 01/12/2023    MCV 96 01/12/2023     01/12/2023     Lab Results   Component Value Date    NEUTROABS 3 90 12/29/2022        Trend:  Lab Results   Component Value Date     15 2 12/29/2022     13 1 12/08/2022     37 3 (H) 11/17/2022     45 5 (H) 10/27/2022     67 6 (H) 09/29/2022     85 4 (H) 09/22/2022     84 9 (H) 09/16/2022

## 2023-01-18 NOTE — PROGRESS NOTES
Assessment/Plan:    Problem List Items Addressed This Visit        Genitourinary    Malignant neoplasm of endometrium ([address] )    Relevant Orders    NM PET CT skull base to mid thigh    Uterine carcinosarcoma ([address] )     Patient is a very pleasant 66-year-old female with a history of stage Ia carcinosarcoma of the endometrium  She has undergone surgery and is completed chemotherapy  She has had some diarrhea which resulted in a fissure of the labial crural fold however this is improving with Aquaphor  The patient is due to start radiation shortly  Upon completion of that the patient would like to return to work sometime in early February  I feel that it was reasonable  Omid Vogel have discussed follow-up care and planned follow-up visits every 3 months for 2 years followed by every 6 months for 3 years thereafter  I will order PET CT scan for next visit  Other Visit Diagnoses     Uterine sarcoma ([address] )    -  Primary    Relevant Orders    NM PET CT skull base to mid thigh            CHIEF COMPLAINT: Completion of chemotherapy for stage Ia carcinosarcoma of endometrium      Problem:  Cancer Staging   Uterine carcinosarcoma [facility])  Staging form: Corpus Uteri - Carcinoma, AJCC 8th Edition  - Clinical stage from 8/26/2022: FIGO Stage IA (cT1a, cN0, cM0) - Signed by Lorie Bullock MD on 8/26/2022        Previous therapy:  Oncology History   Uterine carcinosarcoma ([address] )   7/20/2022 Biopsy    Endometrium, EMB:  - Markedly atypical glandular and stromal elements, suspicious for carcinosarcoma  See comment  Comment: There are superficial strips of atypical glandular elements which are diffusely positive for p53 by immunohistochemistry  Additionally, the the stromal elements demonstrate significant nuclear pleomorphism  The overall findings are suspicious for carcinosarcoma       8/18/2022 Initial Diagnosis    Uterine carcinosarcoma ([address] )     8/26/2022 -  Cancer Staged    Staging form: Corpus Uteri - Carcinoma, AJCC 8th Edition  - Clinical stage from 8/26/2022: FIGO Stage IA (cT1a, cN0, cM0) - Signed by Anahy Roberts MD on 8/26/2022  Histopathologic type: Carcinosarcoma  Stage prefix: Initial diagnosis  Histologic grade (G): G3  Histologic grading system: 3 grade system       8/2022 Surgery    Patient and did went robotic hysterectomy bilateral salpingo-oophorectomy sentinel lymph node mapping and biopsy for stage I A non invasive carcinosarcoma of the endometrium  The uterus did fracture within the vagina but no leakage into the abdomen was noted  9/19/2022 -  Chemotherapy    palonosetron (ALOXI), 0 25 mg, Intravenous, Once, 6 of 6 cycles  Administration: 0 25 mg (9/19/2022), 0 25 mg (10/10/2022), 0 25 mg (10/31/2022), 0 25 mg (11/21/2022), 0 25 mg (12/12/2022), 0 25 mg (1/3/2023)  pegfilgrastim (NEULASTA), 6 mg, Subcutaneous, Once, 5 of 5 cycles  Administration: 6 mg (10/11/2022), 6 mg (11/1/2022), 6 mg (11/22/2022), 6 mg (12/13/2022), 6 mg (1/4/2023)  fosaprepitant (EMEND) IVPB, 150 mg, Intravenous, Once, 6 of 6 cycles  Administration: 150 mg (9/19/2022), 150 mg (10/10/2022), 150 mg (10/31/2022), 150 mg (11/21/2022), 150 mg (12/12/2022), 150 mg (1/3/2023)  CARBOplatin (PARAPLATIN) IVPB (GOG AUC DOSING), 572 4 mg, Intravenous, Once, 6 of 6 cycles  Administration: 572 4 mg (9/19/2022), 579 6 mg (10/10/2022), 579 6 mg (10/31/2022), 575 4 mg (11/21/2022), 564 mg (12/12/2022), 564 mg (1/3/2023)  PACLItaxel (TAXOL) chemo IVPB, 175 mg/m2 = 274 8 mg, Intravenous, Once, 6 of 6 cycles  Administration: 274 8 mg (9/19/2022), 278 4 mg (10/10/2022), 278 4 mg (10/31/2022), 276 6 mg (11/21/2022), 273 mg (12/12/2022), 273 mg (1/3/2023)           Patient ID: Jaleesa Lawler is a 77 y o  female  Patient is a very pleasant 24-year-old female with a history of stage Ia carcinosarcoma of the endometrium    She has undergone initial robotically assisted total laparoscopic hysterectomy bilateral salpingo-oophorectomy followed by 6 cycles of carboplatin and paclitaxel therapy  She has tolerated her treatment reasonably well  Follow-up PET CT scan was unremarkable in late December 2022  The patient has subsequently been following up with radiation therapy for vaginal brachytherapy  Treatment has not yet begun but the patient was seen in consultation  Her radiation therapy has been delayed due to some diarrhea after her last chemo and fissure of the labia majora  The patient has been treating this with Aquaphor and this is getting much better  She presents today in follow-up  I reviewed the patient's CBC C  and  data  Her  has normalized to 15  This was as high as 85 at one-point  Today, the patient is doing well  She denies significant abdominal pain, pelvic pain, nausea, vomiting, constipation, diarrhea, fevers, chills, or vaginal bleeding  The following portions of the patient's history were reviewed and updated as appropriate: allergies, current medications, past medical history, past social history, past surgical history and problem list     Review of Systems   Constitutional: Negative  HENT: Negative  Eyes: Negative  Respiratory: Negative  Cardiovascular: Negative  Gastrointestinal: Negative  Endocrine: Negative  Genitourinary: Negative  Musculoskeletal: Negative  Skin: Negative  Neurological: Negative  Hematological: Negative  Psychiatric/Behavioral: Negative          Current Outpatient Medications   Medication Sig Dispense Refill   • cholecalciferol (VITAMIN D3) 1,000 units tablet Take 1,000 Units by mouth daily     • cyanocobalamin (VITAMIN B-12) 100 MCG tablet Take 100 mcg by mouth daily     • escitalopram (LEXAPRO) 10 mg tablet TAKE 1 TABLET (10 MG TOTAL) BY MOUTH DAILY 90 tablet 3   • loratadine-pseudoephedrine (CLARITIN-D 24-HOUR)  mg per 24 hr tablet Take 1 tablet by mouth daily     • Multiple Vitamins-Minerals (OCUVITE PO) Take by mouth     • Multiple Vitamins-Minerals (OCUVITE PO) Take by mouth in the morning     • ondansetron (ZOFRAN) 8 mg tablet TAKE 1 TABLET (8 MG TOTAL) BY MOUTH EVERY 8 (EIGHT) HOURS AS NEEDED FOR NAUSEA OR VOMITING 30 tablet 1   • rosuvastatin (CRESTOR) 5 mg tablet TAKE 1 TABLET (5 MG TOTAL) BY MOUTH DAILY 90 tablet 3   • LORazepam (ATIVAN) 1 mg tablet Take 1 tablet (1 mg total) by mouth every 6 (six) hours as needed for anxiety (or nausea) (Patient not taking: Reported on 1/9/2023) 36 tablet 1   • Multiple Vitamins-Minerals (multivitamin with minerals) tablet Take 1 tablet by mouth daily (Patient not taking: Reported on 1/9/2023)     • naloxone (NARCAN) 4 mg/0 1 mL nasal spray Administer 1 spray into a nostril  If no response after 2-3 minutes, give another dose in the other nostril using a new spray  (Patient not taking: Reported on 1/9/2023) 1 each 1     No current facility-administered medications for this visit  Objective:    Blood pressure 128/78, pulse (!) 122, temperature 98 2 °F (36 8 °C), temperature source Temporal, resp  rate 16, height 5' 1 3" (1 557 m), weight 57 5 kg (126 lb 12 8 oz), SpO2 98 %, not currently breastfeeding  Body mass index is 23 72 kg/m²  Body surface area is 1 56 meters squared  Physical Exam  Constitutional:       Appearance: She is well-developed  HENT:      Head: Normocephalic and atraumatic  Neck:      Thyroid: No thyromegaly  Cardiovascular:      Rate and Rhythm: Normal rate and regular rhythm  Heart sounds: Normal heart sounds  Pulmonary:      Effort: Pulmonary effort is normal       Breath sounds: Normal breath sounds  Abdominal:      General: Bowel sounds are normal       Palpations: Abdomen is soft  Comments: Well healed laparoscopic incisions  Genitourinary:     Comments: -Normal external female genitalia with healing fissure in the left labia majora near the gluteal crural fold, normal Bartholin's and [place]'s glands                  -Normal midline urethral meatus   No lesions notes                  -Bladder without fullness mass or tenderness                  -Vagina without lesion or discharge No significant cystocele or rectocele noted                  -Cervix surgically absent                  -Uterus surgically absent                  -Adnexae surgically absent                  - Anus without fissure of lesion    Musculoskeletal:         General: Normal range of motion  Cervical back: Normal range of motion and neck supple  Lymphadenopathy:      Cervical: No cervical adenopathy  Skin:     General: Skin is warm and dry  Neurological:      Mental Status: She is alert and oriented to person, place, and time     Psychiatric:         Behavior: Behavior normal          Lab Results   Component Value Date     15 2 12/29/2022     Lab Results   Component Value Date    K 3 2 (L) 01/12/2023     01/12/2023    CO2 28 01/12/2023    BUN 13 01/12/2023    CREATININE 0 60 01/12/2023    GLUF 108 (H) 01/12/2023    CALCIUM 8 7 01/12/2023    CORRECTEDCA 9 1 11/23/2022    AST 23 01/12/2023    ALT 28 01/12/2023    ALKPHOS 115 01/12/2023    EGFR 92 01/12/2023     Lab Results   Component Value Date    WBC 19 83 (H) 01/12/2023    HGB 9 8 (L) 01/12/2023    HCT 31 7 (L) 01/12/2023    MCV 96 01/12/2023     01/12/2023     Lab Results   Component Value Date    NEUTROABS 3 90 12/29/2022        Trend:  Lab Results   Component Value Date     15 2 12/29/2022     13 1 12/08/2022     37 3 (H) 11/17/2022     45 5 (H) 10/27/2022     67 6 (H) 09/29/2022     85 4 (H) 09/22/2022     84 9 (H) 09/16/2022

## 2023-01-19 ENCOUNTER — APPOINTMENT (OUTPATIENT)
Dept: LAB | Facility: HOSPITAL | Age: 68
End: 2023-01-19

## 2023-01-19 ENCOUNTER — TELEPHONE (OUTPATIENT)
Dept: SURGICAL ONCOLOGY | Facility: CLINIC | Age: 68
End: 2023-01-19

## 2023-01-19 DIAGNOSIS — C54.1 MALIGNANT NEOPLASM OF ENDOMETRIUM (HCC): ICD-10-CM

## 2023-01-19 DIAGNOSIS — C55 UTERINE CARCINOSARCOMA (HCC): ICD-10-CM

## 2023-01-19 LAB
ALBUMIN SERPL BCP-MCNC: 3.7 G/DL (ref 3.5–5)
ALP SERPL-CCNC: 95 U/L (ref 46–116)
ALT SERPL W P-5'-P-CCNC: 22 U/L (ref 12–78)
ANION GAP SERPL CALCULATED.3IONS-SCNC: 10 MMOL/L (ref 4–13)
AST SERPL W P-5'-P-CCNC: 21 U/L (ref 5–45)
BASOPHILS # BLD AUTO: 0.02 THOUSANDS/ÂΜL (ref 0–0.1)
BASOPHILS NFR BLD AUTO: 0 % (ref 0–1)
BILIRUB SERPL-MCNC: 0.28 MG/DL (ref 0.2–1)
BUN SERPL-MCNC: 18 MG/DL (ref 5–25)
CALCIUM SERPL-MCNC: 8.9 MG/DL (ref 8.3–10.1)
CANCER AG125 SERPL-ACNC: 13.8 U/ML (ref 0–30)
CHLORIDE SERPL-SCNC: 104 MMOL/L (ref 96–108)
CO2 SERPL-SCNC: 27 MMOL/L (ref 21–32)
CREAT SERPL-MCNC: 0.58 MG/DL (ref 0.6–1.3)
EOSINOPHIL # BLD AUTO: 0.04 THOUSAND/ÂΜL (ref 0–0.61)
EOSINOPHIL NFR BLD AUTO: 1 % (ref 0–6)
ERYTHROCYTE [DISTWIDTH] IN BLOOD BY AUTOMATED COUNT: 14.6 % (ref 11.6–15.1)
GFR SERPL CREATININE-BSD FRML MDRD: 93 ML/MIN/1.73SQ M
GLUCOSE P FAST SERPL-MCNC: 104 MG/DL (ref 65–99)
HCT VFR BLD AUTO: 35.4 % (ref 34.8–46.1)
HGB BLD-MCNC: 11.1 G/DL (ref 11.5–15.4)
IMM GRANULOCYTES # BLD AUTO: 0.04 THOUSAND/UL (ref 0–0.2)
IMM GRANULOCYTES NFR BLD AUTO: 1 % (ref 0–2)
LYMPHOCYTES # BLD AUTO: 1.3 THOUSANDS/ÂΜL (ref 0.6–4.47)
LYMPHOCYTES NFR BLD AUTO: 21 % (ref 14–44)
MAGNESIUM SERPL-MCNC: 1.9 MG/DL (ref 1.6–2.6)
MCH RBC QN AUTO: 30 PG (ref 26.8–34.3)
MCHC RBC AUTO-ENTMCNC: 31.4 G/DL (ref 31.4–37.4)
MCV RBC AUTO: 96 FL (ref 82–98)
MONOCYTES # BLD AUTO: 0.58 THOUSAND/ÂΜL (ref 0.17–1.22)
MONOCYTES NFR BLD AUTO: 9 % (ref 4–12)
NEUTROPHILS # BLD AUTO: 4.34 THOUSANDS/ÂΜL (ref 1.85–7.62)
NEUTS SEG NFR BLD AUTO: 68 % (ref 43–75)
NRBC BLD AUTO-RTO: 0 /100 WBCS
PLATELET # BLD AUTO: 265 THOUSANDS/UL (ref 149–390)
PMV BLD AUTO: 8.9 FL (ref 8.9–12.7)
POTASSIUM SERPL-SCNC: 4.1 MMOL/L (ref 3.5–5.3)
PROT SERPL-MCNC: 6.7 G/DL (ref 6.4–8.4)
RBC # BLD AUTO: 3.7 MILLION/UL (ref 3.81–5.12)
SODIUM SERPL-SCNC: 141 MMOL/L (ref 135–147)
WBC # BLD AUTO: 6.32 THOUSAND/UL (ref 4.31–10.16)

## 2023-01-23 ENCOUNTER — TELEPHONE (OUTPATIENT)
Dept: PALLIATIVE MEDICINE | Facility: CLINIC | Age: 68
End: 2023-01-23

## 2023-01-23 NOTE — TELEPHONE ENCOUNTER
I spoke with patient this morning at our [place] [id number] location and she was inquiring on when her port removal could be scheduled  She states she had talk a bit with Morenita Connor when she was in the office and is following this up  She goes back to work 2/5/23 and would then know her days off schedule  Patient is asking for a call back with an update

## 2023-01-23 NOTE — TELEPHONE ENCOUNTER
Called IR to speak with someone about patient's port removal  IR will be reaching out to patient to schedule port removal today (1/23/23)

## 2023-01-24 ENCOUNTER — ANESTHESIA EVENT (OUTPATIENT)
Dept: PERIOP | Facility: AMBULARY SURGERY CENTER | Age: 68
End: 2023-01-24

## 2023-01-25 NOTE — PRE-PROCEDURE INSTRUCTIONS
Pre-Surgery Instructions:   Medication Instructions   • escitalopram (LEXAPRO) 10 mg tablet Take day of surgery  with sip of water    • rosuvastatin (CRESTOR) 5 mg tablet Take day of surgery  with sip of water     Spoke with pts son Lilyan Opitz and pt for completion of PAT interview - pts son stated pt is not currently taking any vitamins at this time  Reviewed all medications and instructions for DOS  Reviewed all showering instructions and COVID visitation policy  Pt aware that [address]  is location for DOS, instructed that pt pre op nurse will call on 1/25/23 to give specific instructions for DOS  Pt instructed to bring photo ID and insurance card for DOS, remove all jewelry and  NO valuables for DOS  Pt instructed to use only Tylenol between now 1/25/23  and DOS, NO NSAID products  Pt informed transport is needed for DOS due to receiving anesthesia  Instructed patient to minimize alcohol use prior to surgery  No alcohol 24 hours prior to surgery to reduce risk of dehydration  Pt verbalized understanding of all instructions given and reviewed for DOS  Pt is vaccinated for COVID   Pt has instructions from office regarding showering for DOS     My Surgical Experience    The following information was developed to assist you to prepare for your operation  What do I need to do before coming to the hospital?  • Arrange for a responsible person to drive you to and from the hospital   • Arrange care for your children at home  Children are not allowed in the recovery areas of the hospital  • Plan to wear clothing that is easy to put on and take off  If you are having shoulder surgery, wear a shirt that buttons or zippers in the front  Bathing  o Shower the evening before and the morning of your surgery with an antibacterial soap   Please refer to the Pre Op Showering Instructions for Surgery Patients Sheet   o Remove nail polish and all body piercing jewelry  o Do not shave any body part for at least 24 hours before surgery-this includes face, arms, legs and upper body  Food  o Nothing to eat or drink after midnight the night before your surgery  This includes candy and chewing gum  o Exception: If your surgery is after 12:00pm (noon), you may have clear liquids such as 7-Up®, ginger ale, apple or cranberry juice, Jell-O®, water, or clear broth until 8:00 am  o Do not drink milk or juice with pulp on the morning before surgery  o Do not drink alcohol 24 hours before surgery  Medicine  o Follow instructions you received from your surgeon about which medicines you may take on the day of surgery  o If instructed to take medicine on the morning of surgery, take pills with just a small sip of water  Call your prescribing doctor for specific infroamtion on what to do if you take insulin    What should I bring to the hospital?    Bring:  • Crutches or a walker, if you have them, for foot or knee surgery  • A list of the daily medicines, vitamins, minerals, herbals and nutritional supplements you take  Include the dosages of medicines and the time you take them each day  • Glasses, dentures or hearing aids  • Minimal clothing; you will be wearing hospital sleepwear  • Photo ID; required to verify your identity  • If you have a Living Will or Power of , bring a copy of the documents  • If you have an ostomy, bring an extra pouch and any supplies you use    Do not bring  • Medicines or inhalers  • Money, valuables or jewelry    What other information should I know about the day of surgery? • Notify your surgeons if you develop a cold, sore throat, cough, fever, rash or any other illness  • Report to the Ambulatory Surgical/Same Day Surgery Unit  • You will be instructed to stop at Registration only if you have not been pre-registered  • Inform your  fi they do not stay that they will be asked by the staff to leave a phone number where they can be reached  • Be available to be reached before surgery   In the event the operating room schedule changes, you may be asked to come in earlier or later than expected    *It is important to tell your doctor and others involved in your health care if you are taking or have been taking any non-prescription drugs, vitamins, minerals, herbals or other nutritional supplements   Any of these may interact with some food or medicines and cause a reaction

## 2023-01-26 ENCOUNTER — HOSPITAL ENCOUNTER (OUTPATIENT)
Facility: AMBULARY SURGERY CENTER | Age: 68
Setting detail: OUTPATIENT SURGERY
Discharge: HOME | End: 2023-01-26
Attending: RADIOLOGY | Admitting: RADIOLOGY

## 2023-01-26 ENCOUNTER — ANESTHESIA (OUTPATIENT)
Dept: PERIOP | Facility: AMBULARY SURGERY CENTER | Age: 68
End: 2023-01-26

## 2023-01-26 VITALS
RESPIRATION RATE: 20 BRPM | TEMPERATURE: 98.4 F | SYSTOLIC BLOOD PRESSURE: 116 MMHG | DIASTOLIC BLOOD PRESSURE: 61 MMHG | HEART RATE: 78 BPM | OXYGEN SATURATION: 97 %

## 2023-01-26 RX ORDER — MIDAZOLAM HYDROCHLORIDE 2 MG/2ML
INJECTION, SOLUTION INTRAMUSCULAR; INTRAVENOUS AS NEEDED
Status: DISCONTINUED | OUTPATIENT
Start: 2023-01-26 | End: 2023-01-26

## 2023-01-26 RX ORDER — FENTANYL CITRATE 50 UG/ML
INJECTION, SOLUTION INTRAMUSCULAR; INTRAVENOUS AS NEEDED
Status: DISCONTINUED | OUTPATIENT
Start: 2023-01-26 | End: 2023-01-26

## 2023-01-26 RX ORDER — LIDOCAINE HYDROCHLORIDE AND EPINEPHRINE 10; 10 MG/ML; UG/ML
INJECTION, SOLUTION INFILTRATION; PERINEURAL AS NEEDED
Status: DISCONTINUED | OUTPATIENT
Start: 2023-01-26 | End: 2023-01-26 | Stop reason: HOSPADM

## 2023-01-26 RX ORDER — ONDANSETRON 2 MG/ML
4 INJECTION INTRAMUSCULAR; INTRAVENOUS ONCE AS NEEDED
Status: DISCONTINUED | OUTPATIENT
Start: 2023-01-26 | End: 2023-01-26 | Stop reason: HOSPADM

## 2023-01-26 RX ORDER — SODIUM CHLORIDE, SODIUM LACTATE, POTASSIUM CHLORIDE, CALCIUM CHLORIDE 600; 310; 30; 20 MG/100ML; MG/100ML; MG/100ML; MG/100ML
INJECTION, SOLUTION INTRAVENOUS CONTINUOUS PRN
Status: DISCONTINUED | OUTPATIENT
Start: 2023-01-26 | End: 2023-01-26

## 2023-01-26 RX ADMIN — MIDAZOLAM HYDROCHLORIDE 2 MG: 1 INJECTION, SOLUTION INTRAMUSCULAR; INTRAVENOUS at 11:42

## 2023-01-26 RX ADMIN — SODIUM CHLORIDE, SODIUM LACTATE, POTASSIUM CHLORIDE, AND CALCIUM CHLORIDE: .6; .31; .03; .02 INJECTION, SOLUTION INTRAVENOUS at 11:41

## 2023-01-26 RX ADMIN — FENTANYL CITRATE 25 MCG: 50 INJECTION, SOLUTION INTRAMUSCULAR; INTRAVENOUS at 11:58

## 2023-01-26 RX ADMIN — FENTANYL CITRATE 75 MCG: 50 INJECTION, SOLUTION INTRAMUSCULAR; INTRAVENOUS at 11:51

## 2023-01-26 NOTE — OP NOTE
Right chest port removal 1/26/23    History-    Uterine Carcinosarcoma      Contrast- None     Fluoroscopy time: None         Procedure- The patient was identified verbally and by wristband  Timeout was performed  Informed consent was obtained  Following obtaining informed consent, the patient was prepped and   draped in the usual sterile fashion  The region of the right anterior   chest port was anesthetized using 1% lidocaine  A incision was made   over the previously placed chest port with a 15 blade scalpel  Using   blunt and sharp dissection the port was removed in its entirety  The   port pocket was flushed with copious saline  The incision was   approximated using 3-0 Vicryl and tissue adhesive  The patient tolerated   the procedure well without apparent immediate complication  The patient   left the IR department in unchanged condition  Dr Lana Mccormack performed and   directly supervised the entire procedure  Findings-      The skin overlying the chest wall is nonerythematous  There is no   evidence of purulent exudate  The port was removed in its entirety   without complication  Impression-       Successful removal of right chest port

## 2023-01-26 NOTE — ANESTHESIA PREPROCEDURE EVALUATION
Procedure:  REMOVAL VENOUS PORT (PORT-A-CATH)IR (Chest)    Relevant Problems   CARDIO   (+) Hyperlipidemia, mixed      GYN   (+) History of robot-assisted laparoscopic hysterectomy   (+) Malignant neoplasm of endometrium (HCC)   (+) Uterine carcinosarcoma (HCC)      NEURO/PSYCH   (+) Anxiety   (+) Situational anxiety      Lab Results   Component Value Date    WBC 6 32 01/19/2023    HGB 11 1 (L) 01/19/2023    HCT 35 4 01/19/2023    MCV 96 01/19/2023     01/19/2023     Lab Results   Component Value Date    K 4 1 01/19/2023    CO2 27 01/19/2023     01/19/2023    BUN 18 01/19/2023    CREATININE 0 58 (L) 01/19/2023     No results found for: INR, PROTIME  No results found for: PTT    No results found for: GLUCOSE    Lab Results   Component Value Date    HGBA1C 5 5 08/03/2022       Type and Screen:  O  Physical Exam    Airway    Mallampati score: II  TM Distance: >3 FB  Neck ROM: full     Dental       Cardiovascular      Pulmonary      Other Findings        Anesthesia Plan  ASA Score- 2     Anesthesia Type- IV sedation with anesthesia with ASA Monitors  Additional Monitors:   Airway Plan:           Plan Factors-Exercise tolerance (METS): >4 METS  Chart reviewed  Existing labs reviewed  Patient summary reviewed  Induction- intravenous  Postoperative Plan-     Informed Consent- Anesthetic plan and risks discussed with patient  I personally reviewed this patient with the CRNA  Discussed and agreed on the Anesthesia Plan with the CRNA  Rosangela March

## 2023-01-26 NOTE — INTERVAL H&P NOTE
Patient arrived to [facility] for port removal    The procedure and risks were discussed with the patient  All questions were answered  Informed consent was obtained  H & P reviewed after examining the patient and I find no changes in the patient condition since the H & P has been written  /79   Pulse (!) 50   Temp 97 5 °F (36 4 °C) (Temporal)   Resp 18   SpO2 98%     Patient re-evaluated   Accept as history and physical     Sonny Josh, DO/January 26, 2023/10:32 AM

## 2023-01-26 NOTE — ANESTHESIA POSTPROCEDURE EVALUATION
Post-Op Assessment Note    CV Status:  Stable  Pain Score: 0    Pain management: adequate     Mental Status:  Alert and awake   Hydration Status:  Euvolemic   PONV Controlled:  Controlled   Airway Patency:  Patent      Post Op Vitals Reviewed: Yes      Staff: CRNA, Anesthesiologist         No notable events documented      BP   109/59   Temp   98 2   Pulse 83   Resp   16   SpO2   98

## 2023-01-26 NOTE — DISCHARGE INSTRUCTIONS
Implanted Venous Access Port Removal    WHAT YOU NEED TO KNOW:   An implanted venous access port is a device used to give treatments and take blood  It may also be called a central venous access device (CVAD)  The port is a small container that is placed under your skin, usually in your upper chest  A port can also be placed in your arm or abdomen  The port is attached to a catheter that enters a large vein  DISCHARGE INSTRUCTIONS:   Resume your normal diet  Small sips of flat soda will help with mild nausea  Prevent an infection:     Wash your hands often  Use soap and water  Clean your hands before and  after you care for your incision  Check your skin for infection every day  Look for redness, swelling, or fluid oozing from the incision site  Dressing may come off in 24 hours  Medical glue will peel off on its own in 5 to 10 days  You may shower 24 hours after procedure  Follow up with your healthcare provider as directed  Write down your questions so you remember to ask them during your visits  Activity:  You may return to your daily activities when the area heals  Contact Interventional Radiology at [phone] Sasha PATIENTS: Contact Interventional Radiology at [phone]) Alexa Villalobos PATIENTS: Contact Interventional Radiology at [phone]) if:     You have a fever  You have persistent nausea  Your inciscion site is red, swollen, or draining pus  You have questions or concerns about your condition or care  Seek care immediately or call 911 if:  Blood soaks through your bandage  The skin over or around your incision breaks open  Your heart is jumping or fluttering  You have a headache, blurred vision, and feel confused  You have pain in your arm, neck, shoulder, or chest     You have trouble breathing that is getting worse over time

## 2023-02-06 ENCOUNTER — APPOINTMENT (OUTPATIENT)
Dept: RADIATION ONCOLOGY | Facility: CLINIC | Age: 68
End: 2023-02-06
Attending: RADIOLOGY

## 2023-02-08 ENCOUNTER — APPOINTMENT (OUTPATIENT)
Dept: RADIATION ONCOLOGY | Facility: CLINIC | Age: 68
End: 2023-02-08

## 2023-02-10 ENCOUNTER — APPOINTMENT (OUTPATIENT)
Dept: RADIATION ONCOLOGY | Facility: CLINIC | Age: 68
End: 2023-02-10
Attending: RADIOLOGY

## 2023-02-10 ENCOUNTER — APPOINTMENT (OUTPATIENT)
Dept: RADIATION ONCOLOGY | Facility: CLINIC | Age: 68
End: 2023-02-10

## 2023-02-15 ENCOUNTER — APPOINTMENT (OUTPATIENT)
Dept: RADIATION ONCOLOGY | Facility: CLINIC | Age: 68
End: 2023-02-15

## 2023-03-06 ENCOUNTER — CLINICAL SUPPORT (OUTPATIENT)
Dept: RADIATION ONCOLOGY | Facility: CLINIC | Age: 68
End: 2023-03-06
Attending: RADIOLOGY

## 2023-03-06 ENCOUNTER — RADIATION ONCOLOGY FOLLOW-UP (OUTPATIENT)
Dept: RADIATION ONCOLOGY | Facility: CLINIC | Age: 68
End: 2023-03-06
Attending: RADIOLOGY

## 2023-03-06 VITALS
TEMPERATURE: 98.1 F | OXYGEN SATURATION: 98 % | SYSTOLIC BLOOD PRESSURE: 112 MMHG | BODY MASS INDEX: 23.95 KG/M2 | DIASTOLIC BLOOD PRESSURE: 78 MMHG | HEART RATE: 86 BPM | RESPIRATION RATE: 16 BRPM | WEIGHT: 128 LBS

## 2023-03-06 DIAGNOSIS — C55 UTERINE CARCINOSARCOMA (HCC): Primary | ICD-10-CM

## 2023-03-06 NOTE — PROGRESS NOTES
Follow-up - Radiation Oncology   Brenden Cronin 1955 77 y o  female [id number]      History of Present Illness   Cancer Staging   Uterine carcinosarcoma [facility])  Staging form: Corpus Uteri - Carcinoma, AJCC 8th Edition  - Clinical stage from 2022: FIGO Stage IA (cT1a, cN0, cM0) - Signed by Mindy Castellon MD on 2022  Histopathologic type: Carcinosarcoma, NOS  Stage prefix: Initial diagnosis  Histologic grade (G): G3  Histologic grading system: 3 grade system      Brenden Cronin is a 77 y o  female with a history of stage IA carcinosarcoma status post resection  Pathology showed 6 3cm tumor, but depth could not be assessed due to uterus split during resection within the vagina  She completed adjuvant chemotherapy and then vaginal cuff radiation on 2/10/23  She presents today for follow up  The patient states that she generally feels well and has no complaints  She denies any abdominal or pelvic cramping  She denies any vaginal discharge, bleeding, or irritation  She denies any significant urinary symptoms including hematuria, dysuria, urinary incontinence  She denies diarrhea or rectal bleeding  She denies remedy edema      Upcomin23 PET CT  23 Dr Dos Santos Payment   Oncology History   Uterine carcinosarcoma ([address] )   2022 Biopsy    Endometrium, EMB:  - Markedly atypical glandular and stromal elements, suspicious for carcinosarcoma  See comment  Comment: There are superficial strips of atypical glandular elements which are diffusely positive for p53 by immunohistochemistry  Additionally, the the stromal elements demonstrate significant nuclear pleomorphism  The overall findings are suspicious for carcinosarcoma  2022 Initial Diagnosis    Uterine carcinosarcoma ([address] )     2022 -  Cancer Staged    Staging form: Corpus Uteri - Carcinoma, AJCC 8th Edition  - Clinical stage from 2022:  FIGO Stage IA (cT1a, cN0, cM0) - Signed by Ryder Baltazar Jia Greene MD on 8/26/2022  Histopathologic type: Carcinosarcoma  Stage prefix: Initial diagnosis  Histologic grade (G): G3  Histologic grading system: 3 grade system       8/2022 Surgery    Patient and did went robotic hysterectomy bilateral salpingo-oophorectomy sentinel lymph node mapping and biopsy for stage I A non invasive carcinosarcoma of the endometrium  The uterus did fracture within the vagina but no leakage into the abdomen was noted  9/19/2022 -  Chemotherapy    palonosetron (ALOXI), 0 25 mg, Intravenous, Once, 6 of 6 cycles  Administration: 0 25 mg (9/19/2022), 0 25 mg (10/10/2022), 0 25 mg (10/31/2022), 0 25 mg (11/21/2022), 0 25 mg (12/12/2022), 0 25 mg (1/3/2023)  pegfilgrastim (NEULASTA), 6 mg, Subcutaneous, Once, 5 of 5 cycles  Administration: 6 mg (10/11/2022), 6 mg (11/1/2022), 6 mg (11/22/2022), 6 mg (12/13/2022), 6 mg (1/4/2023)  fosaprepitant (EMEND) IVPB, 150 mg, Intravenous, Once, 6 of 6 cycles  Administration: 150 mg (9/19/2022), 150 mg (10/10/2022), 150 mg (10/31/2022), 150 mg (11/21/2022), 150 mg (12/12/2022), 150 mg (1/3/2023)  CARBOplatin (PARAPLATIN) IVPB (GOG AUC DOSING), 572 4 mg, Intravenous, Once, 6 of 6 cycles  Administration: 572 4 mg (9/19/2022), 579 6 mg (10/10/2022), 579 6 mg (10/31/2022), 575 4 mg (11/21/2022), 564 mg (12/12/2022), 564 mg (1/3/2023)  PACLItaxel (TAXOL) chemo IVPB, 175 mg/m2 = 274 8 mg, Intravenous, Once, 6 of 6 cycles  Administration: 274 8 mg (9/19/2022), 278 4 mg (10/10/2022), 278 4 mg (10/31/2022), 276 6 mg (11/21/2022), 273 mg (12/12/2022), 273 mg (1/3/2023)     2/1/2023 - 2/10/2023 Radiation    Treatments:  Course: C1 HDR 2 5cmCyl  Plan ID Energy Source Fractions Dose per Fraction (cGy) Rx Dose Total Dose Delivered (cGy) Elapsed Days   HDR2 5cm cylinder diameter  Ir-192 3 / 3 700 5mm from cylinder surface 2,100 9    Treatment Dates:  2/1/2023, 2/6/2023, 2/10/2023            Past Medical History:   Diagnosis Date   • Anemia    • Anxiety    • Hiatal hernia    • Hypercholesterolemia    • Uterine cancer [facility])      Past Surgical History:   Procedure Laterality Date   • COLONOSCOPY      complete    • DILATION AND CURETTAGE OF UTERUS      x2    • FL GUIDED CENTRAL VENOUS ACCESS DEVICE INSERTION  9/6/2022   • SD INSJ TUNNELED CTR VAD W/SUBQ PORT AGE 5 YR/> N/A 9/6/2022    Procedure: INSERTION VENOUS PORT ( PORT-A-CATH) IR;  Surgeon: True Elam DO;  [facility];  Service: Interventional Radiology   • SD LAPS TOTAL HYSTERECT 250 GM/< W/RMVL TUBE/OVARY N/A 8/18/2022    Procedure: HYSTERECTOMY LAPAROSCOPIC TOTAL ([address]) W/ ROBOTICS, BILATERAL SALPINGO-OOPHORECTOMY, SENTINEL LYMPH NODE MAPPING AND BIOPSY;  Surgeon: Jp Servin MD;  Location: [facility];  Service: Gynecology Oncology   • SD RMVL ANN-MARIE CTR VAD W/SUBQ PORT/ CTR/PRPH INSJ N/A 1/26/2023    Procedure: REMOVAL VENOUS PORT (PORT-A-CATH)IR;  Surgeon: True Elam DO;  [facility];  Service: Interventional Radiology       Social History   Social History     Substance and Sexual Activity   Alcohol Use No    Comment: No use of alcohol at this time - former use only social use     Social History     Substance and Sexual Activity   Drug Use No    Comment: Denies any drug use per pts son     Social History     Tobacco Use   Smoking Status Never   Smokeless Tobacco Never   Tobacco Comments    Never a smoker or use of any tobacco products per pts son          Meds/Allergies     Current Outpatient Medications:   •  cholecalciferol (VITAMIN D3) 1,000 units tablet, Take 1,000 Units by mouth daily, Disp: , Rfl:   •  cyanocobalamin (VITAMIN B-12) 100 MCG tablet, Take 100 mcg by mouth daily, Disp: , Rfl:   •  escitalopram (LEXAPRO) 10 mg tablet, TAKE 1 TABLET (10 MG TOTAL) BY MOUTH DAILY, Disp: 90 tablet, Rfl: 3  •  loratadine-pseudoephedrine (CLARITIN-D 24-HOUR)  mg per 24 hr tablet, Take 1 tablet by mouth daily, Disp: , Rfl:   •  LORazepam (ATIVAN) 1 mg tablet, Take 1 tablet (1 mg total) by mouth every 6 (six) hours as needed for anxiety (or nausea), Disp: 36 tablet, Rfl: 1  •  Multiple Vitamins-Minerals (multivitamin with minerals) tablet, Take 1 tablet by mouth daily, Disp: , Rfl:   •  Multiple Vitamins-Minerals (OCUVITE PO), Take by mouth, Disp: , Rfl:   •  Multiple Vitamins-Minerals (OCUVITE PO), Take by mouth in the morning, Disp: , Rfl:   •  rosuvastatin (CRESTOR) 5 mg tablet, TAKE 1 TABLET (5 MG TOTAL) BY MOUTH DAILY, Disp: 90 tablet, Rfl: 3  •  naloxone (NARCAN) 4 mg/0 1 mL nasal spray, Administer 1 spray into a nostril  If no response after 2-3 minutes, give another dose in the other nostril using a new spray , Disp: 1 each, Rfl: 1  •  ondansetron (ZOFRAN) 8 mg tablet, TAKE 1 TABLET (8 MG TOTAL) BY MOUTH EVERY 8 (EIGHT) HOURS AS NEEDED FOR NAUSEA OR VOMITING, Disp: 30 tablet, Rfl: 1  Allergies   Allergen Reactions   • Sulfa Antibiotics Facial Swelling     Redness itching   • Acetazolamide Other (See Comments)     1/25/23 Per pts son and pt - unaware of allergy or reaction at this time       Review of Systems   Constitutional: Negative  HENT: Negative  Eyes:        Wears glasses   Respiratory: Negative  Cardiovascular: Negative  Gastrointestinal: Negative  Genitourinary: Negative  Negative for frequency, pelvic pain, urgency, vaginal bleeding, vaginal discharge and vaginal pain  Musculoskeletal: Negative  Skin: Negative  Allergic/Immunologic: Negative  Neurological: Positive for numbness (B/L fingers and toes)  Hematological: Negative  Psychiatric/Behavioral: Negative  OBJECTIVE:   /78   Pulse 86   Temp 98 1 °F (36 7 °C)   Resp 16   Wt 58 1 kg (128 lb)   SpO2 98%   BMI 23 95 kg/m²   Karnofsky: 90 - Able to carry on normal activity; minor signs or symptoms of disease     Physical Exam  Vitals and nursing note reviewed  Constitutional:       Appearance: She is not ill-appearing     Cardiovascular:      Rate and Rhythm: Normal rate and regular rhythm  Pulmonary:      Breath sounds: No wheezing, rhonchi or rales  Abdominal:      General: There is no distension  Palpations: Abdomen is soft  Tenderness: There is no abdominal tenderness  Comments: Well-healed port wounds   Genitourinary:     Comments: GYN examination demonstrates vaginal cuff is foreshortened  There is mild stenosis, but no adhesions  Vaginal cuff is closed and no palpable nodules  Speculum examination shows no suspicious lesions  Musculoskeletal:      Right lower leg: No edema  Left lower leg: No edema  Lymphadenopathy:      Cervical: No cervical adenopathy  Lower Body: No right inguinal adenopathy  No left inguinal adenopathy  Neurological:      Mental Status: She is alert  Assessment/Plan:  Jose Mcdonald is a 77 y o   female with a history of stage IA carcinosarcoma status post resection  Pathology showed 6 3cm tumor, but depth could not be assessed due to uterus split during resection within the vagina  However, the accepted stage was stage IA  She completed adjuvant chemotherapy and then vaginal cuff radiation on 2/10/23  She has recovered well from radiation  She will follow-up with imaging and exam with Dr Ca Bourgeois in April 2023  Small size vaginal dilator provided with instructions on use  Follow-up in 6 months  Trey Lieberman MD  3/6/2023,2:31 PM    Portions of the record may have been created with voice recognition software   Occasional wrong word or "sound a like" substitutions may have occurred due to the inherent limitations of voice recognition software   Read the chart carefully and recognize, using context, where substitutions have occurred

## 2023-03-06 NOTE — PROGRESS NOTES
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 followed by adjuvant chemotherapy  She recently completed adjuvant  vaginal cuff radiation on 2/10/23  She presents today for follow up  She tolerated radiation well without complication or notable toxicity  Upcomin23 PET CT  23 Dr Nia Thomas      Follow up visit     Oncology History   Uterine carcinosarcoma ([facility] [address] )   2022 Biopsy    Endometrium, EMB:  - Markedly atypical glandular and stromal elements, suspicious for carcinosarcoma  See comment  Comment: There are superficial strips of atypical glandular elements which are diffusely positive for p53 by immunohistochemistry  Additionally, the the stromal elements demonstrate significant nuclear pleomorphism  The overall findings are suspicious for carcinosarcoma  2022 Initial Diagnosis    Uterine carcinosarcoma ([facility] [address] )     2022 -  Cancer Staged    Staging form: Corpus Uteri - Carcinoma, AJCC 8th Edition  - Clinical stage from 2022: FIGO Stage IA (cT1a, cN0, cM0) - Signed by Lorie Bullock MD on 2022  Histopathologic type: Carcinosarcoma  Stage prefix: Initial diagnosis  Histologic grade (G): G3  Histologic grading system: 3 grade system       2022 Surgery    Patient and did went robotic hysterectomy bilateral salpingo-oophorectomy sentinel lymph node mapping and biopsy for stage I A non invasive carcinosarcoma of the endometrium  The uterus did fracture within the vagina but no leakage into the abdomen was noted       2022 -  Chemotherapy    palonosetron (ALOXI), 0 25 mg, Intravenous, Once, 6 of 6 cycles  Administration: 0 25 mg (2022), 0 25 mg (10/10/2022), 0 25 mg (10/31/2022), 0 25 mg (2022), 0 25 mg (2022), 0 25 mg (1/3/2023)  pegfilgrastim (NEULASTA), 6 mg, Subcutaneous, Once, 5 of 5 cycles  Administration: 6 mg (10/11/2022), 6 mg (2022), 6 mg (2022), 6 mg (12/13/2022), 6 mg (1/4/2023)  fosaprepitant (EMEND) IVPB, 150 mg, Intravenous, Once, 6 of 6 cycles  Administration: 150 mg (9/19/2022), 150 mg (10/10/2022), 150 mg (10/31/2022), 150 mg (11/21/2022), 150 mg (12/12/2022), 150 mg (1/3/2023)  CARBOplatin (PARAPLATIN) IVPB (GO AUC DOSING), 572 4 mg, Intravenous, Once, 6 of 6 cycles  Administration: 572 4 mg (9/19/2022), 579 6 mg (10/10/2022), 579 6 mg (10/31/2022), 575 4 mg (11/21/2022), 564 mg (12/12/2022), 564 mg (1/3/2023)  PACLItaxel (TAXOL) chemo IVPB, 175 mg/m2 = 274 8 mg, Intravenous, Once, 6 of 6 cycles  Administration: 274 8 mg (9/19/2022), 278 4 mg (10/10/2022), 278 4 mg (10/31/2022), 276 6 mg (11/21/2022), 273 mg (12/12/2022), 273 mg (1/3/2023)     2/1/2023 - 2/10/2023 Radiation    Treatments:  Course: C1 HDR 2 5cmCyl  Plan ID Energy Source Fractions Dose per Fraction (cGy) Rx Dose Total Dose Delivered (cGy) Elapsed Days   HDR2 5cm cylinder diameter  Ir-192 3 / 3 700 5mm from cylinder surface 2,100 9    Treatment Dates:  2/1/2023, 2/6/2023, 2/10/2023  Review of Systems:  Review of Systems   Constitutional: Negative  HENT: Negative  Eyes:        Wears glasses   Respiratory: Negative  Cardiovascular: Negative  Gastrointestinal: Negative  Genitourinary: Negative  Negative for frequency, pelvic pain, urgency, vaginal bleeding, vaginal discharge and vaginal pain  Musculoskeletal: Negative  Skin: Negative  Allergic/Immunologic: Negative  Neurological: Positive for numbness (B/L fingers and toes)  Hematological: Negative  Psychiatric/Behavioral: Negative          Clinical Trial: no        Health Maintenance   Topic Date Due   • Colorectal Cancer Screening  Never done   • Osteoporosis Screening  Never done   • Breast Cancer Screening: Mammogram  08/15/2020   • COVID-19 Vaccine (4 - Booster for Moderna series) 02/04/2022   • Influenza Vaccine (1) 09/01/2022   • Fall Risk  03/17/2023   • Medicare Annual Wellness Visit (AWV) 03/17/2023   • Urinary Incontinence Screening  03/17/2023   • Depression Screening  01/09/2024   • BMI: Adult  01/18/2024   • Hepatitis C Screening  Completed   • Pneumococcal Vaccine: 65+ Years  Completed   • HIB Vaccine  Aged Out   • IPV Vaccine  Aged Out   • Hepatitis A Vaccine  Aged Out   • Meningococcal ACWY Vaccine  Aged Out   • HPV Vaccine  Aged Out     Patient Active Problem List   Diagnosis   • Screening mammogram, encounter for   • Situational anxiety   • Arthralgia   • Hyperlipidemia, mixed   • Anxiety   • PMB (postmenopausal bleeding)   • Malignant neoplasm of endometrium ([facility] [address] )   • History of robot-assisted laparoscopic hysterectomy   • U[facility] )   • Neutropenia associated with mucositis due to antineoplastic therapy [facility])     Past Medical History:   Diagnosis Date   • Anemia    • Anxiety    • Hiatal hernia    • Hypercholesterolemia    • U[facility] )      Past Surgical History:   Procedure Laterality Date   • COLONOSCOPY      complete    • DILATION AND CURETTAGE OF UTERUS      x2    • FL GUIDED CENTRAL VENOUS ACCESS DEVICE INSERTION  9/6/2022   • ID INSJ TUNNELED CTR VAD W/SUBQ PORT AGE 5 YR/> N/A 9/6/2022    Procedure: INSERTION VENOUS PORT ( PORT-A-CATH) IR;  Surgeon: Ronald Ugalde DO;  [facility];  Service: Interventional Radiology   • ID LAPS TOTAL HYSTERECT 250 GM/< W/RMVL TUBE/OVARY N/A 8/18/2022    Procedure: HYSTERECTOMY LAPAROSCOPIC TOTAL ([address] [place] [address]) W/ ROBOTICS, BILATERAL SALPINGO-OOPHORECTOMY, SENTINEL LYMPH NODE MAPPING AND BIOPSY;  Surgeon: Kathryn Rutledge MD;  Location: [facility];  Service: Gynecology Oncology   • ID RMVL ANN-MARIE CTR VAD W/SUBQ PORT/ CTR/PRPH INSJ N/A 1/26/2023    Procedure: REMOVAL VENOUS PORT (PORT-A-CATH)IR;  Surgeon: Ronald Ugalde DO;  Location: [facility];  Service: Interventional Radiology     Family History   Problem Relation Age of Onset   • Lung cancer Father    • Brain cancer Father    • Breast cancer Cousin    • Arthritis Family    • Hypertension Family         essential     • Hyperlipidemia Family    • Endometrial cancer Neg Hx    • Ovarian cancer Neg Hx    • Colon cancer Neg Hx    • Anesthesia problems Neg Hx      Social History     Socioeconomic History   • Marital status:      Spouse name: Not on file   • Number of children: Not on file   • Years of education: Not on file   • Highest education level: Not on file   Occupational History   • Not on file   Tobacco Use   • Smoking status: Never   • Smokeless tobacco: Never   • Tobacco comments:     Never a smoker or use of any tobacco products per pts son    Vaping Use   • Vaping Use: Never used   Substance and Sexual Activity   • Alcohol use: No     Comment: No use of alcohol at this time - former use only social use   • Drug use: No     Comment: Denies any drug use per pts son   • Sexual activity: Not Currently   Other Topics Concern   • Not on file   Social History Narrative   • Not on file     Social Determinants of Health     Financial Resource Strain: Not on file   Food Insecurity: Not on file   Transportation Needs: Not on file   Physical Activity: Not on file   Stress: Not on file   Social Connections: Not on file   Intimate Partner Violence: Not on file   Housing Stability: Not on file       Current Outpatient Medications:   •  cholecalciferol (VITAMIN D3) 1,000 units tablet, Take 1,000 Units by mouth daily, Disp: , Rfl:   •  cyanocobalamin (VITAMIN B-12) 100 MCG tablet, Take 100 mcg by mouth daily, Disp: , Rfl:   •  escitalopram (LEXAPRO) 10 mg tablet, TAKE 1 TABLET (10 MG TOTAL) BY MOUTH DAILY, Disp: 90 tablet, Rfl: 3  •  loratadine-pseudoephedrine (CLARITIN-D 24-HOUR)  mg per 24 hr tablet, Take 1 tablet by mouth daily, Disp: , Rfl:   •  LORazepam (ATIVAN) 1 mg tablet, Take 1 tablet (1 mg total) by mouth every 6 (six) hours as needed for anxiety (or nausea) (Patient not taking: Reported on 1/9/2023), Disp: 36 tablet, Rfl: 1  •  Multiple Vitamins-Minerals (multivitamin with minerals) tablet, Take 1 tablet by mouth daily (Patient not taking: Reported on 1/9/2023), Disp: , Rfl:   •  Multiple Vitamins-Minerals (OCUVITE PO), Take by mouth, Disp: , Rfl:   •  Multiple Vitamins-Minerals (OCUVITE PO), Take by mouth in the morning, Disp: , Rfl:   •  naloxone (NARCAN) 4 mg/0 1 mL nasal spray, Administer 1 spray into a nostril  If no response after 2-3 minutes, give another dose in the other nostril using a new spray  (Patient not taking: Reported on 1/9/2023), Disp: 1 each, Rfl: 1  •  ondansetron (ZOFRAN) 8 mg tablet, TAKE 1 TABLET (8 MG TOTAL) BY MOUTH EVERY 8 (EIGHT) HOURS AS NEEDED FOR NAUSEA OR VOMITING, Disp: 30 tablet, Rfl: 1  •  rosuvastatin (CRESTOR) 5 mg tablet, TAKE 1 TABLET (5 MG TOTAL) BY MOUTH DAILY, Disp: 90 tablet, Rfl: 3  Allergies   Allergen Reactions   • Sulfa Antibiotics Facial Swelling     Redness itching   • Acetazolamide Other (See Comments)     1/25/23 Per pts son and pt - unaware of allergy or reaction at this time     There were no vitals filed for this visit

## 2023-03-17 DIAGNOSIS — F41.9 ANXIETY: ICD-10-CM

## 2023-03-17 DIAGNOSIS — E78.2 HYPERLIPIDEMIA, MIXED: ICD-10-CM

## 2023-03-21 RX ORDER — ESCITALOPRAM OXALATE 10 MG/1
10 TABLET ORAL DAILY
Qty: 90 TABLET | Refills: 3 | Status: SHIPPED | OUTPATIENT
Start: 2023-03-21

## 2023-03-21 RX ORDER — ROSUVASTATIN CALCIUM 5 MG/1
5 TABLET, COATED ORAL DAILY
Qty: 90 TABLET | Refills: 3 | Status: SHIPPED | OUTPATIENT
Start: 2023-03-21

## 2023-03-29 ENCOUNTER — TELEPHONE (OUTPATIENT)
Dept: FAMILY MEDICINE CLINIC | Facility: CLINIC | Age: 68
End: 2023-03-29

## 2023-03-29 DIAGNOSIS — N95.0 PMB (POSTMENOPAUSAL BLEEDING): ICD-10-CM

## 2023-03-29 DIAGNOSIS — E78.2 HYPERLIPIDEMIA, MIXED: Primary | ICD-10-CM

## 2023-03-29 NOTE — TELEPHONE ENCOUNTER
Elliott Rubio called the office to ask if you would please put in orders for her routine labs   Checking CBC, CMP and Lipids

## 2023-04-26 ENCOUNTER — OFFICE VISIT (OUTPATIENT)
Dept: GYNECOLOGIC ONCOLOGY | Facility: CLINIC | Age: 68
End: 2023-04-26

## 2023-04-26 VITALS
DIASTOLIC BLOOD PRESSURE: 64 MMHG | BODY MASS INDEX: 24.73 KG/M2 | RESPIRATION RATE: 16 BRPM | OXYGEN SATURATION: 96 % | WEIGHT: 131 LBS | SYSTOLIC BLOOD PRESSURE: 118 MMHG | TEMPERATURE: 96.2 F | HEIGHT: 61 IN | HEART RATE: 111 BPM

## 2023-04-26 DIAGNOSIS — C54.1 ENDOMETRIAL CANCER (HCC): Primary | ICD-10-CM

## 2023-04-26 DIAGNOSIS — C55 UTERINE CARCINOSARCOMA (HCC): ICD-10-CM

## 2023-04-26 NOTE — PROGRESS NOTES
Assessment/Plan:    Problem List Items Addressed This Visit        Genitourinary    Uterine carcinosarcoma ([facility] [address] )     Patient is a very pleasant 58-year-old female with a history of stage Ia carcinosarcoma of the endometrium status post robotic surgery staging followed by 6 cycles of platinum taxane therapy and vaginal brachytherapy  She is attained a complete remission by exam and recent PET CT scan  She is recovering completely and although has minimal fatigue feels quite well  Her hair is growing back and she is grateful for her recovery  Her exam today is unremarkable  We have recommended following up with every 3-month visits for 2 years and every 6-month visits for 3 years thereafter  We recommended  be performed with those as that has been a good marker for her in the past         Other Visit Diagnoses     Endometrial cancer ([facility] [address] )    -  Primary    Relevant Orders                CHIEF COMPLAINT: Posttreatment carcinosarcoma endometrium stage Ia      Problem:  Cancer Staging   Uterine carcinosarcoma [facility])  Staging form: Corpus Uteri - Carcinoma, AJCC 8th Edition  - Clinical stage from 8/26/2022: FIGO Stage IA (cT1a, cN0, cM0) - Signed by Brigitte Cope MD on 8/26/2022        Previous therapy:  Oncology History   Uterine carcinosarcoma ([facility] [address] )   7/20/2022 Biopsy    Endometrium, EMB:  - Markedly atypical glandular and stromal elements, suspicious for carcinosarcoma  See comment  Comment: There are superficial strips of atypical glandular elements which are diffusely positive for p53 by immunohistochemistry  Additionally, the the stromal elements demonstrate significant nuclear pleomorphism  The overall findings are suspicious for carcinosarcoma  8/18/2022 Initial Diagnosis    Uterine carcinosarcoma ([facility] [address] )     8/26/2022 -  Cancer Staged    Staging form: Corpus Uteri - Carcinoma, AJCC 8th Edition  - Clinical stage from 8/26/2022:  FIGO Stage IA (cT1a, cN0, cM0) - Signed by Smith Mac Matilde Quinones MD on 8/26/2022  Histopathologic type: Carcinosarcoma  Stage prefix: Initial diagnosis  Histologic grade (G): G3  Histologic grading system: 3 grade system       8/2022 Surgery    Patient and did went robotic hysterectomy bilateral salpingo-oophorectomy sentinel lymph node mapping and biopsy for stage I A non invasive carcinosarcoma of the endometrium  The uterus did fracture within the vagina but no leakage into the abdomen was noted  9/19/2022 -  Chemotherapy    palonosetron (ALOXI), 0 25 mg, Intravenous, Once, 6 of 6 cycles  Administration: 0 25 mg (9/19/2022), 0 25 mg (10/10/2022), 0 25 mg (10/31/2022), 0 25 mg (11/21/2022), 0 25 mg (12/12/2022), 0 25 mg (1/3/2023)  pegfilgrastim (NEULASTA), 6 mg, Subcutaneous, Once, 5 of 5 cycles  Administration: 6 mg (10/11/2022), 6 mg (11/1/2022), 6 mg (11/22/2022), 6 mg (12/13/2022), 6 mg (1/4/2023)  fosaprepitant (EMEND) IVPB, 150 mg, Intravenous, Once, 6 of 6 cycles  Administration: 150 mg (9/19/2022), 150 mg (10/10/2022), 150 mg (10/31/2022), 150 mg (11/21/2022), 150 mg (12/12/2022), 150 mg (1/3/2023)  CARBOplatin (PARAPLATIN) IVPB (GOG AUC DOSING), 572 4 mg, Intravenous, Once, 6 of 6 cycles  Administration: 572 4 mg (9/19/2022), 579 6 mg (10/10/2022), 579 6 mg (10/31/2022), 575 4 mg (11/21/2022), 564 mg (12/12/2022), 564 mg (1/3/2023)  PACLItaxel (TAXOL) chemo IVPB, 175 mg/m2 = 274 8 mg, Intravenous, Once, 6 of 6 cycles  Administration: 274 8 mg (9/19/2022), 278 4 mg (10/10/2022), 278 4 mg (10/31/2022), 276 6 mg (11/21/2022), 273 mg (12/12/2022), 273 mg (1/3/2023)     2/1/2023 - 2/10/2023 Radiation    Treatments:  Course: C1 HDR 2 5cmCyl  Plan ID Energy Source Fractions Dose per Fraction (cGy) Rx Dose Total Dose Delivered (cGy) Elapsed Days   HDR2 5cm cylinder diameter  Ir-192 3 / 3 700 5mm from cylinder surface 2,100 9    Treatment Dates:  2/1/2023, 2/6/2023, 2/10/2023              Patient ID: Benny Owens is a 77 y o  female  Patient is a very pleasant 78-year-old female with a history of stage Ia noninvasive carcinosarcoma of the endometrium  She underwent robot hysterectomy BSO and staging followed by 6 cycles of carboplatin and paclitaxel and vaginal brachytherapy  Her  in initiation of treatment was approximately 80  This is come down to 13  The patient presents today for first surveillance visit  She has undergone a PET CT scan which revealed no evidence of recurrence of disease  Today, the patient is doing well  She denies significant abdominal pain, pelvic pain, nausea, vomiting, constipation, diarrhea, fevers, chills, or vaginal bleeding  Patient started work in February and feels much better  She is grateful for her care and for her recovery  The following portions of the patient's history were reviewed and updated as appropriate: allergies, current medications, past family history, past medical history, past social history and problem list     Review of Systems   Constitutional: Negative  HENT: Negative  Eyes: Negative  Respiratory: Negative  Cardiovascular: Negative  Gastrointestinal: Negative  Endocrine: Negative  Genitourinary: Negative  Musculoskeletal: Negative  Skin: Negative  Neurological: Negative  Hematological: Negative  Psychiatric/Behavioral: Negative          Current Outpatient Medications   Medication Sig Dispense Refill   • cholecalciferol (VITAMIN D3) 1,000 units tablet Take 1,000 Units by mouth daily     • cyanocobalamin (VITAMIN B-12) 100 MCG tablet Take 100 mcg by mouth daily     • escitalopram (LEXAPRO) 10 mg tablet Take 1 tablet (10 mg total) by mouth daily 90 tablet 3   • loratadine-pseudoephedrine (CLARITIN-D 24-HOUR)  mg per 24 hr tablet Take 1 tablet by mouth daily     • LORazepam (ATIVAN) 1 mg tablet Take 1 tablet (1 mg total) by mouth every 6 (six) hours as needed for anxiety (or nausea) 36 tablet 1   • Multiple Vitamins-Minerals (multivitamin with minerals) tablet "Take 1 tablet by mouth daily     • Multiple Vitamins-Minerals (OCUVITE PO) Take by mouth     • Multiple Vitamins-Minerals (OCUVITE PO) Take by mouth in the morning     • naloxone (NARCAN) 4 mg/0 1 mL nasal spray Administer 1 spray into a nostril  If no response after 2-3 minutes, give another dose in the other nostril using a new spray  1 each 1   • ondansetron (ZOFRAN) 8 mg tablet TAKE 1 TABLET (8 MG TOTAL) BY MOUTH EVERY 8 (EIGHT) HOURS AS NEEDED FOR NAUSEA OR VOMITING 30 tablet 1   • rosuvastatin (CRESTOR) 5 mg tablet Take 1 tablet (5 mg total) by mouth daily 90 tablet 3     No current facility-administered medications for this visit  Objective:    Blood pressure 118/64, pulse (!) 111, temperature (!) 96 2 °F (35 7 °C), temperature source Temporal, resp  rate 16, height 5' 1 3\" (1 557 m), weight 59 4 kg (131 lb), SpO2 96 %, not currently breastfeeding  Body mass index is 24 51 kg/m²  Body surface area is 1 58 meters squared  Physical Exam  Constitutional:       Appearance: She is well-developed  HENT:      Head: Normocephalic and atraumatic  Neck:      Thyroid: No thyromegaly  Cardiovascular:      Rate and Rhythm: Normal rate and regular rhythm  Heart sounds: Normal heart sounds  Pulmonary:      Effort: Pulmonary effort is normal       Breath sounds: Normal breath sounds  Abdominal:      General: Bowel sounds are normal       Palpations: Abdomen is soft  Comments: Well healed laparoscopic incisions  Genitourinary:     Comments: -Normal external female genitalia, normal Bartholin's and Bock's glands                  -Normal midline urethral meatus   No lesions notes                  -Bladder without fullness mass or tenderness                  -Vagina without lesion or discharge No significant cystocele or rectocele noted                  -Cervix surgically absent                  -Uterus surgically absent                  -Adnexae surgically absent                  - Anus " without fissure of lesion    Musculoskeletal:         General: Normal range of motion  Cervical back: Normal range of motion and neck supple  Lymphadenopathy:      Cervical: No cervical adenopathy  Skin:     General: Skin is warm and dry  Neurological:      Mental Status: She is alert and oriented to person, place, and time     Psychiatric:         Behavior: Behavior normal          Lab Results   Component Value Date     13 8 01/19/2023     Lab Results   Component Value Date    K 4 1 01/19/2023     01/19/2023    CO2 27 01/19/2023    BUN 18 01/19/2023    CREATININE 0 58 (L) 01/19/2023    GLUF 104 (H) 01/19/2023    CALCIUM 8 9 01/19/2023    CORRECTEDCA 9 1 11/23/2022    AST 21 01/19/2023    ALT 22 01/19/2023    ALKPHOS 95 01/19/2023    EGFR 93 01/19/2023     Lab Results   Component Value Date    WBC 6 32 01/19/2023    HGB 11 1 (L) 01/19/2023    HCT 35 4 01/19/2023    MCV 96 01/19/2023     01/19/2023     Lab Results   Component Value Date    NEUTROABS 4 34 01/19/2023        Trend:  Lab Results   Component Value Date     13 8 01/19/2023     15 2 12/29/2022     13 1 12/08/2022     37 3 (H) 11/17/2022     45 5 (H) 10/27/2022     67 6 (H) 09/29/2022     85 4 (H) 09/22/2022     84 9 (H) 09/16/2022

## 2023-04-26 NOTE — ASSESSMENT & PLAN NOTE
Patient is a very pleasant 66-year-old female with a history of stage Ia carcinosarcoma of the endometrium status post robotic surgery staging followed by 6 cycles of platinum taxane therapy and vaginal brachytherapy  She is attained a complete remission by exam and recent PET CT scan  She is recovering completely and although has minimal fatigue feels quite well  Her hair is growing back and she is grateful for her recovery  Her exam today is unremarkable  We have recommended following up with every 3-month visits for 2 years and every 6-month visits for 3 years thereafter    We recommended  be performed with those as that has been a good marker for her in the past

## 2023-07-20 ENCOUNTER — TELEPHONE (OUTPATIENT)
Dept: HEMATOLOGY ONCOLOGY | Facility: CLINIC | Age: 68
End: 2023-07-20

## 2023-07-20 NOTE — TELEPHONE ENCOUNTER
Appointment Change  Cancel, Reschedule, Change to Virtual      Who are you speaking with? Patient   If it is not the patient, are they listed on an active communication consent form? N/A   Which provider is the appointment scheduled with? Dr. Zach Moreno   When is the appointment scheduled? Please list date and time  8/2/23 @ 3:30pm   At which location is the appointment scheduled to take place? [facility] ([facility])   Was the appointment rescheduled or changed from an in person visit to a virtual visit? If so, please list the details of the change. Yes 8/29/23 @ 10:30am- patient is requesting an order for    What is the reason for the appointment change? Patient has to work    Was [facility] transport scheduled for this visit? no   Does [facility] transport need to be scheduled for the new visit (if applicable) no   Does the patient need an infusion appointment rescheduled? no   Does the patient have an infusion appointment scheduled? If so, when? no   Is the patient undergoing chemotherapy? no   Was the no-show policy reviewed for appointments being changed with less then 24 hours of notice?  yes

## 2023-08-08 ENCOUNTER — APPOINTMENT (OUTPATIENT)
Dept: LAB | Facility: CLINIC | Age: 68
End: 2023-08-08
Payer: MEDICARE

## 2023-08-08 DIAGNOSIS — E78.2 HYPERLIPIDEMIA, MIXED: ICD-10-CM

## 2023-08-08 DIAGNOSIS — C54.1 ENDOMETRIAL CANCER (HCC): ICD-10-CM

## 2023-08-08 DIAGNOSIS — N95.0 PMB (POSTMENOPAUSAL BLEEDING): ICD-10-CM

## 2023-08-08 LAB
CANCER AG125 SERPL-ACNC: 15.1 U/ML (ref 0–35)
CHOLEST SERPL-MCNC: 177 MG/DL
HDLC SERPL-MCNC: 76 MG/DL
LDLC SERPL CALC-MCNC: 84 MG/DL (ref 0–100)
TRIGL SERPL-MCNC: 87 MG/DL

## 2023-08-08 PROCEDURE — 86304 IMMUNOASSAY TUMOR CA 125: CPT

## 2023-08-08 PROCEDURE — 80061 LIPID PANEL: CPT

## 2023-08-10 ENCOUNTER — OFFICE VISIT (OUTPATIENT)
Dept: FAMILY MEDICINE CLINIC | Facility: CLINIC | Age: 68
End: 2023-08-10
Payer: MEDICARE

## 2023-08-10 VITALS
OXYGEN SATURATION: 97 % | WEIGHT: 137 LBS | BODY MASS INDEX: 25.86 KG/M2 | SYSTOLIC BLOOD PRESSURE: 128 MMHG | DIASTOLIC BLOOD PRESSURE: 68 MMHG | TEMPERATURE: 97.9 F | HEART RATE: 93 BPM | HEIGHT: 61 IN

## 2023-08-10 DIAGNOSIS — E78.2 HYPERLIPIDEMIA, MIXED: ICD-10-CM

## 2023-08-10 DIAGNOSIS — Z12.31 OTHER SCREENING MAMMOGRAM: ICD-10-CM

## 2023-08-10 DIAGNOSIS — Z12.11 COLON CANCER SCREENING: ICD-10-CM

## 2023-08-10 DIAGNOSIS — Z00.00 MEDICARE ANNUAL WELLNESS VISIT, SUBSEQUENT: Primary | ICD-10-CM

## 2023-08-10 PROBLEM — N95.0 PMB (POSTMENOPAUSAL BLEEDING): Status: RESOLVED | Noted: 2022-07-20 | Resolved: 2023-08-10

## 2023-08-10 PROBLEM — D70.1 NEUTROPENIA ASSOCIATED WITH MUCOSITIS DUE TO ANTINEOPLASTIC THERAPY (HCC): Status: RESOLVED | Noted: 2022-10-21 | Resolved: 2023-08-10

## 2023-08-10 PROBLEM — K12.31 NEUTROPENIA ASSOCIATED WITH MUCOSITIS DUE TO ANTINEOPLASTIC THERAPY (HCC): Status: RESOLVED | Noted: 2022-10-21 | Resolved: 2023-08-10

## 2023-08-10 PROBLEM — T45.1X5S NEUTROPENIA ASSOCIATED WITH MUCOSITIS DUE TO ANTINEOPLASTIC THERAPY (HCC): Status: RESOLVED | Noted: 2022-10-21 | Resolved: 2023-08-10

## 2023-08-10 PROCEDURE — G0439 PPPS, SUBSEQ VISIT: HCPCS | Performed by: FAMILY MEDICINE

## 2023-08-10 NOTE — PROGRESS NOTES
Assessment/Plan:         Problem List Items Addressed This Visit        Other    Hyperlipidemia, mixed - Primary   Other Visit Diagnoses     Colon cancer screening        Relevant Orders    Cologuard    Other screening mammogram        Relevant Orders    Mammo screening bilateral w 3d & cad            Subjective:      Patient ID: Carla Bourgeois is a 77 y.o. female. HPI    The following portions of the patient's history were reviewed and updated as appropriate:   Past Medical History:  She has a past medical history of Anemia, Anxiety, Hiatal hernia, Hypercholesterolemia, and Uterine cancer ([address]). ,  _______________________________________________________________________  Medical Problems:  does not have any pertinent problems on file.,  _______________________________________________________________________  Past Surgical History:   has a past surgical history that includes Colonoscopy; Dilation and curettage of uterus; pr laps total hysterect 250 gm/< w/rmvl tube/ovary (N/A, 8/18/2022); pr insj tunneled ctr vad w/subq port age 9 yr/> (N/A, 9/6/2022); FL guided central venous access device insertion (9/6/2022); and pr rmvl [facility] ctr vad w/subq port/ ctr/prph insj (N/A, 1/26/2023). ,  _______________________________________________________________________  Family History:  family history includes Arthritis in her family; Brain cancer in her father; Breast cancer in her cousin; Hyperlipidemia in her family; Hypertension in her family; Lung cancer in her father.,  _______________________________________________________________________  Social History:   reports that she has never smoked. She has never used smokeless tobacco. She reports that she does not drink alcohol and does not use drugs. ,  _______________________________________________________________________  Allergies:  is allergic to sulfa antibiotics and acetazolamide. .  _______________________________________________________________________  Current Outpatient Medications   Medication Sig Dispense Refill   • cholecalciferol (VITAMIN D3) 1,000 units tablet Take 1,000 Units by mouth daily     • cyanocobalamin (VITAMIN B-12) 100 MCG tablet Take 100 mcg by mouth daily     • escitalopram (LEXAPRO) 10 mg tablet Take 1 tablet (10 mg total) by mouth daily 90 tablet 3   • loratadine-pseudoephedrine (CLARITIN-D 24-HOUR)  mg per 24 hr tablet Take 1 tablet by mouth daily     • LORazepam (ATIVAN) 1 mg tablet Take 1 tablet (1 mg total) by mouth every 6 (six) hours as needed for anxiety (or nausea) 36 tablet 1   • Multiple Vitamins-Minerals (multivitamin with minerals) tablet Take 1 tablet by mouth daily     • Multiple Vitamins-Minerals (OCUVITE PO) Take by mouth     • rosuvastatin (CRESTOR) 5 mg tablet Take 1 tablet (5 mg total) by mouth daily 90 tablet 3   • naloxone (NARCAN) 4 mg/0.1 mL nasal spray Administer 1 spray into a nostril. If no response after 2-3 minutes, give another dose in the other nostril using a new spray. 1 each 1   • ondansetron (ZOFRAN) 8 mg tablet TAKE 1 TABLET (8 MG TOTAL) BY MOUTH EVERY 8 (EIGHT) HOURS AS NEEDED FOR NAUSEA OR VOMITING 30 tablet 1     No current facility-administered medications for this visit.     _______________________________________________________________________  Review of Systems   Neurological: Positive for numbness. Objective:  Vitals:    08/10/23 0835   BP: 128/68   BP Location: Left arm   Patient Position: Sitting   Cuff Size: Large   Pulse: 93   Temp: 97.9 °F (36.6 °C)   SpO2: 97%   Weight: 62.1 kg (137 lb)   Height: 5' 1.3" (1.557 m)     Body mass index is 25.63 kg/m².      Physical Exam

## 2023-08-10 NOTE — PROGRESS NOTES
Assessment and Plan:     Problem List Items Addressed This Visit        Other    Hyperlipidemia, mixed   Other Visit Diagnoses     Medicare annual wellness visit, subsequent    -  Primary    Colon cancer screening        Relevant Orders    Cologuard    Other screening mammogram        Relevant Orders    Mammo screening bilateral w 3d & cad        BMI Counseling: Body mass index is 25.63 kg/m². The BMI is above normal. Nutrition recommendations include encouraging healthy choices of fruits and vegetables. Exercise recommendations include exercising 3-5 times per week. Rationale for BMI follow-up plan is due to patient being overweight or obese. Preventive health issues were discussed with patient, and age appropriate screening tests were ordered as noted in patient's After Visit Summary. Personalized health advice and appropriate referrals for health education or preventive services given if needed, as noted in patient's After Visit Summary. History of Present Illness:     Patient presents for a Medicare Wellness Visit    Had labs done which we reviewed. CBC normal. Lipids well controlled on Crestor. Patient Care Team:  Gaby Hoffmann MD as PCP - WENDY Harley     Review of Systems:     Review of Systems   Constitutional: Negative for activity change. Respiratory: Negative for chest tightness and shortness of breath. Cardiovascular: Negative for chest pain and leg swelling. Gastrointestinal: Negative for abdominal pain. Genitourinary: Negative. Neurological: Negative for headaches. Psychiatric/Behavioral: Negative for dysphoric mood. The patient is not nervous/anxious.          Problem List:     Patient Active Problem List   Diagnosis   • Screening mammogram, encounter for   • Situational anxiety   • Arthralgia   • Hyperlipidemia, mixed   • Anxiety   • Malignant neoplasm of endometrium [facility])   • History of robot-assisted laparoscopic hysterectomy   • Uterine carcinosarcoma [facility])      Past Medical and Surgical History:     Past Medical History:   Diagnosis Date   • Anemia    • Anxiety    • Hiatal hernia    • Hypercholesterolemia    • Uterine cancer [facility])      Past Surgical History:   Procedure Laterality Date   • COLONOSCOPY      complete    • DILATION AND CURETTAGE OF UTERUS      x2    • FL GUIDED CENTRAL VENOUS ACCESS DEVICE INSERTION  9/6/2022   • TN INSJ TUNNELED CTR VAD W/SUBQ PORT AGE 5 YR/> N/A 9/6/2022    Procedure: INSERTION VENOUS PORT ( PORT-A-CATH) IR;  Surgeon: Federica Goyal DO;  [facility];  Service: Interventional Radiology   • TN LAPS TOTAL HYSTERECT 250 GM/< W/RMVL TUBE/OVARY N/A 8/18/2022    Procedure: HYSTERECTOMY LAPAROSCOPIC TOTAL ([address]) W/ ROBOTICS, BILATERAL SALPINGO-OOPHORECTOMY, SENTINEL LYMPH NODE MAPPING AND BIOPSY;  Surgeon: Gustavo Nettles MD;  Location[facility];  Service: Gynecology Oncology   • TN RMVL ANN-MARIE CTR VAD W/SUBQ PORT/ CTR/PRPH INSJ N/A 1/26/2023    Procedure: REMOVAL VENOUS PORT (PORT-A-CATH)IR;  Surgeon: Federica Goyal DO;  [facility];  Service: Interventional Radiology      Family History:     Family History   Problem Relation Age of Onset   • Lung cancer Father    • Brain cancer Father    • Breast cancer Cousin    • Arthritis Family    • Hypertension Family         essential     • Hyperlipidemia Family    • Endometrial cancer Neg Hx    • Ovarian cancer Neg Hx    • Colon cancer Neg Hx    • Anesthesia problems Neg Hx       Social History:     Social History     Socioeconomic History   • Marital status:      Spouse name: None   • Number of children: None   • Years of education: None   • Highest education level: None   Occupational History   • None   Tobacco Use   • Smoking status: Never   • Smokeless tobacco: Never   • Tobacco comments:     Never a smoker or use of any tobacco products per pts son    Vaping Use   • Vaping Use: Never used   Substance and Sexual Activity   • Alcohol use: No     Comment: No use of alcohol at this time - former use only social use   • Drug use: No     Comment: Denies any drug use per pts son   • Sexual activity: Not Currently   Other Topics Concern   • None   Social History Narrative   • None     Social Determinants of Health     Financial Resource Strain: Unknown (8/10/2023)    Overall Financial Resource Strain (CARDIA)    • Difficulty of Paying Living Expenses: Patient refused   Food Insecurity: Not on file   Transportation Needs: No Transportation Needs (8/10/2023)    PRAPARE - Transportation    • Lack of Transportation (Medical): No    • Lack of Transportation (Non-Medical): No   Physical Activity: Not on file   Stress: Not on file   Social Connections: Not on file   Intimate Partner Violence: Not on file   Housing Stability: Not on file      Medications and Allergies:     Current Outpatient Medications   Medication Sig Dispense Refill   • cholecalciferol (VITAMIN D3) 1,000 units tablet Take 1,000 Units by mouth daily     • cyanocobalamin (VITAMIN B-12) 100 MCG tablet Take 100 mcg by mouth daily     • escitalopram (LEXAPRO) 10 mg tablet Take 1 tablet (10 mg total) by mouth daily 90 tablet 3   • loratadine-pseudoephedrine (CLARITIN-D 24-HOUR)  mg per 24 hr tablet Take 1 tablet by mouth daily     • LORazepam (ATIVAN) 1 mg tablet Take 1 tablet (1 mg total) by mouth every 6 (six) hours as needed for anxiety (or nausea) 36 tablet 1   • Multiple Vitamins-Minerals (multivitamin with minerals) tablet Take 1 tablet by mouth daily     • Multiple Vitamins-Minerals (OCUVITE PO) Take by mouth     • rosuvastatin (CRESTOR) 5 mg tablet Take 1 tablet (5 mg total) by mouth daily 90 tablet 3   • naloxone (NARCAN) 4 mg/0.1 mL nasal spray Administer 1 spray into a nostril. If no response after 2-3 minutes, give another dose in the other nostril using a new spray.  1 each 1   • ondansetron (ZOFRAN) 8 mg tablet TAKE 1 TABLET (8 MG TOTAL) BY MOUTH EVERY 8 (EIGHT) HOURS AS NEEDED FOR NAUSEA OR VOMITING 30 tablet 1     No current facility-administered medications for this visit. Allergies   Allergen Reactions   • Sulfa Antibiotics Facial Swelling     Redness itching   • Acetazolamide Other (See Comments)     1/25/23 Per pts son and pt - unaware of allergy or reaction at this time      Immunizations:     Immunization History   Administered Date(s) Administered   • COVID-19 MODERNA VACC 0.5 ML IM 03/25/2021, 04/22/2021, 12/10/2021   • DTaP 5 10/12/2009   • INFLUENZA 10/27/2014, 10/29/2018, 09/16/2021   • Influenza Quadrivalent Preservative Free 3 years and older IM 10/27/2014, 10/01/2015, 10/20/2017   • Influenza Split High Dose Preservative Free IM 10/29/2018   • Influenza, seasonal, injectable 10/24/2016   • Pneumococcal Conjugate 13-Valent 11/12/2018   • Pneumococcal Polysaccharide PPV23 03/17/2021   • Zoster 07/02/2013      Health Maintenance:         Topic Date Due   • Colorectal Cancer Screening  Never done   • Breast Cancer Screening: Mammogram  08/15/2020   • Hepatitis C Screening  Completed         Topic Date Due   • COVID-19 Vaccine (4 - Moderna series) 02/04/2022   • Influenza Vaccine (1) 09/01/2023      Medicare Screening Tests and Risk Assessments:     Charito Fuentes is here for her Subsequent Wellness visit. Last Medicare Wellness visit information reviewed, patient interviewed and updates made to the record today. Health Risk Assessment:   Patient rates overall health as excellent. Patient feels that their physical health rating is much better. Patient is very satisfied with their life. Eyesight was rated as same. Hearing was rated as same. Patient feels that their emotional and mental health rating is much better. Patients states they are never, rarely angry. Patient states they are never, rarely unusually tired/fatigued. Pain experienced in the last 7 days has been none. Patient states that she has experienced no weight loss or gain in last 6 months.      Fall Risk Screening: In the past year, patient has experienced: no history of falling in past year      Urinary Incontinence Screening:   Patient has not leaked urine accidently in the last six months. Home Safety:  Patient does not have trouble with stairs inside or outside of their home. Patient has working smoke alarms and has working carbon monoxide detector. Home safety hazards include: none. Nutrition:   Current diet is Regular. Medications:   Patient is currently taking over-the-counter supplements. OTC medications include: see medication list. Patient is able to manage medications. Activities of Daily Living (ADLs)/Instrumental Activities of Daily Living (IADLs):   Walk and transfer into and out of bed and chair?: Yes  Dress and groom yourself?: Yes    Bathe or shower yourself?: Yes    Feed yourself?  Yes  Do your laundry/housekeeping?: Yes  Manage your money, pay your bills and track your expenses?: Yes  Make your own meals?: Yes    Do your own shopping?: Yes    Previous Hospitalizations:   Any hospitalizations or ED visits within the last 12 months?: No      Advance Care Planning:   Living will: No    Durable POA for healthcare: No    Advanced directive: No      Cognitive Screening:   Provider or family/friend/caregiver concerned regarding cognition?: No    PREVENTIVE SCREENINGS      Cardiovascular Screening:    General: Screening Not Indicated, History Lipid Disorder, Risks and Benefits Discussed and Screening Current      Diabetes Screening:     General: Screening Current and Risks and Benefits Discussed      Colorectal Cancer Screening:     General: Risks and Benefits Discussed    Due for: Cologuard      Breast Cancer Screening:     General: Risks and Benefits Discussed    Due for: Mammogram        Cervical Cancer Screening:    General: Screening Not Indicated      Osteoporosis Screening:    General: Patient Declines      Abdominal Aortic Aneurysm (AAA) Screening:        General: Screening Not Indicated      Lung Cancer Screening:     General: Screening Not Indicated      Hepatitis C Screening:    General: Screening Current    Screening, Brief Intervention, and Referral to Treatment (SBIRT)    Screening  Typical number of drinks in a day: 0  Typical number of drinks in a week: 0  Interpretation: Low risk drinking behavior. Single Item Drug Screening:  How often have you used an illegal drug (including marijuana) or a prescription medication for non-medical reasons in the past year? never    Single Item Drug Screen Score: 0  Interpretation: Negative screen for possible drug use disorder    No results found. Physical Exam:     /68 (BP Location: Left arm, Patient Position: Sitting, Cuff Size: Large)   Pulse 93   Temp 97.9 °F (36.6 °C)   Ht 5' 1.3" (1.557 m)   Wt 62.1 kg (137 lb)   SpO2 97%   BMI 25.63 kg/m²     Physical Exam  Vitals and nursing note reviewed. Constitutional:       General: She is not in acute distress. Appearance: She is well-developed. She is not diaphoretic. HENT:      Head: Normocephalic and atraumatic. Right Ear: Tympanic membrane, ear canal and external ear normal.      Left Ear: Tympanic membrane, ear canal and external ear normal.      Mouth/Throat:      Mouth: Mucous membranes are moist.      Pharynx: Oropharynx is clear. Eyes:      Conjunctiva/sclera: Conjunctivae normal.      Pupils: Pupils are equal, round, and reactive to light. Cardiovascular:      Rate and Rhythm: Normal rate and regular rhythm. Heart sounds: Normal heart sounds. No murmur heard. No friction rub. No gallop. Pulmonary:      Effort: Pulmonary effort is normal. No respiratory distress. Breath sounds: Normal breath sounds. No stridor. No wheezing or rales. Chest:      Chest wall: No tenderness. Abdominal:      General: There is no distension. Palpations: Abdomen is soft. There is no mass. Tenderness: There is no abdominal tenderness. Musculoskeletal:         General: No swelling. Right lower leg: No edema. Left lower leg: No edema. Neurological:      General: No focal deficit present. Mental Status: She is alert. Mental status is at baseline. Cranial Nerves: No cranial nerve deficit. Psychiatric:         Behavior: Behavior normal.         Thought Content:  Thought content normal.         Judgment: Judgment normal.          Valentina Kruger MD

## 2023-08-10 NOTE — PATIENT INSTRUCTIONS
Medicare Preventive Visit Patient Instructions  Thank you for completing your Welcome to Medicare Visit or Medicare Annual Wellness Visit today. Your next wellness visit will be due in one year (8/10/2024). The screening/preventive services that you may require over the next 5-10 years are detailed below. Some tests may not apply to you based off risk factors and/or age. Screening tests ordered at today's visit but not completed yet may show as past due. Also, please note that scanned in results may not display below. Preventive Screenings:  Service Recommendations Previous Testing/Comments   Colorectal Cancer Screening  * Colonoscopy    * Fecal Occult Blood Test (FOBT)/Fecal Immunochemical Test (FIT)  * Fecal DNA/Cologuard Test  * Flexible Sigmoidoscopy Age: 43-73 years old   Colonoscopy: every 10 years (may be performed more frequently if at higher risk)  OR  FOBT/FIT: every 1 year  OR  Cologuard: every 3 years  OR  Sigmoidoscopy: every 5 years  Screening may be recommended earlier than age 39 if at higher risk for colorectal cancer. Also, an individualized decision between you and your healthcare provider will decide whether screening between the ages of 77-80 would be appropriate. Colonoscopy: Not on file  FOBT/FIT: Not on file  Cologuard: Not on file  Sigmoidoscopy: Not on file    Risks and Benefits Discussed  Due for Cologuard     Breast Cancer Screening Age: 36 years old  Frequency: every 1-2 years  Not required if history of left and right mastectomy Mammogram: 08/15/2019    Risks and Benefits Discussed  Due for Mammogram   Cervical Cancer Screening Between the ages of 21-29, pap smear recommended once every 3 years. Between the ages of 32-69, can perform pap smear with HPV co-testing every 5 years.    Recommendations may differ for women with a history of total hysterectomy, cervical cancer, or abnormal pap smears in past. Pap Smear: Not on file    Screening Not Indicated   Hepatitis C Screening Once for adults born between 1945 and 1965  More frequently in patients at high risk for Hepatitis C Hep C Antibody: 03/17/2021    Screening Current   Diabetes Screening 1-2 times per year if you're at risk for diabetes or have pre-diabetes Fasting glucose: 90 mg/dL (8/8/2023)  A1C: 5.5 % (8/3/2022)  Screening Current  Risks and Benefits Discussed   Cholesterol Screening Once every 5 years if you don't have a lipid disorder. May order more often based on risk factors. Lipid panel: 08/08/2023    Screening Not Indicated  History Lipid Disorder  Risks and Benefits Discussed  Screening Current     Other Preventive Screenings Covered by Medicare:  1. Abdominal Aortic Aneurysm (AAA) Screening: covered once if your at risk. You're considered to be at risk if you have a family history of AAA. 2. Lung Cancer Screening: covers low dose CT scan once per year if you meet all of the following conditions: (1) Age 48-67; (2) No signs or symptoms of lung cancer; (3) Current smoker or have quit smoking within the last 15 years; (4) You have a tobacco smoking history of at least 20 pack years (packs per day multiplied by number of years you smoked); (5) You get a written order from a healthcare provider. 3. Glaucoma Screening: covered annually if you're considered high risk: (1) You have diabetes OR (2) Family history of glaucoma OR (3)  aged 48 and older OR (3)  American aged 72 and older  3. Osteoporosis Screening: covered every 2 years if you meet one of the following conditions: (1) You're estrogen deficient and at risk for osteoporosis based off medical history and other findings; (2) Have a vertebral abnormality; (3) On glucocorticoid therapy for more than 3 months; (4) Have primary hyperparathyroidism; (5) On osteoporosis medications and need to assess response to drug therapy. · Last bone density test (DXA Scan): Not on file. 5. HIV Screening: covered annually if you're between the age of 14-79.  Also covered annually if you are younger than 13 and older than 72 with risk factors for HIV infection. For pregnant patients, it is covered up to 3 times per pregnancy. Immunizations:  Immunization Recommendations   Influenza Vaccine Annual influenza vaccination during flu season is recommended for all persons aged >= 6 months who do not have contraindications   Pneumococcal Vaccine   * Pneumococcal conjugate vaccine = PCV13 (Prevnar 13), PCV15 (Vaxneuvance), PCV20 (Prevnar 20)  * Pneumococcal polysaccharide vaccine = PPSV23 (Pneumovax) Adults 20-63 years old: 1-3 doses may be recommended based on certain risk factors  Adults 72 years old: 1-2 doses may be recommended based off what pneumonia vaccine you previously received   Hepatitis B Vaccine 3 dose series if at intermediate or high risk (ex: diabetes, end stage renal disease, liver disease)   Tetanus (Td) Vaccine - COST NOT COVERED BY MEDICARE PART B Following completion of primary series, a booster dose should be given every 10 years to maintain immunity against tetanus. Td may also be given as tetanus wound prophylaxis. Tdap Vaccine - COST NOT COVERED BY MEDICARE PART B Recommended at least once for all adults. For pregnant patients, recommended with each pregnancy. Shingles Vaccine (Shingrix) - COST NOT COVERED BY MEDICARE PART B  2 shot series recommended in those aged 48 and above     Health Maintenance Due:      Topic Date Due   • Colorectal Cancer Screening  Never done   • Breast Cancer Screening: Mammogram  08/15/2020   • Hepatitis C Screening  Completed     Immunizations Due:      Topic Date Due   • COVID-19 Vaccine (4 - Moderna series) 02/04/2022   • Influenza Vaccine (1) 09/01/2023     Advance Directives   What are advance directives? Advance directives are legal documents that state your wishes and plans for medical care. These plans are made ahead of time in case you lose your ability to make decisions for yourself.  Advance directives can apply to any medical decision, such as the treatments you want, and if you want to donate organs. What are the types of advance directives? There are many types of advance directives, and each state has rules about how to use them. You may choose a combination of any of the following:  · Living will: This is a written record of the treatment you want. You can also choose which treatments you do not want, which to limit, and which to stop at a certain time. This includes surgery, medicine, IV fluid, and tube feedings. · Durable power of  for healthcare [facility]): This is a written record that states who you want to make healthcare choices for you when you are unable to make them for yourself. This person, called a proxy, is usually a family member or a friend. You may choose more than 1 proxy. · Do not resuscitate (DNR) order:  A DNR order is used in case your heart stops beating or you stop breathing. It is a request not to have certain forms of treatment, such as CPR. A DNR order may be included in other types of advance directives. · Medical directive: This covers the care that you want if you are in a coma, near death, or unable to make decisions for yourself. You can list the treatments you want for each condition. Treatment may include pain medicine, surgery, blood transfusions, dialysis, IV or tube feedings, and a ventilator (breathing machine). · Values history: This document has questions about your views, beliefs, and how you feel and think about life. This information can help others choose the care that you would choose. Why are advance directives important? An advance directive helps you control your care. Although spoken wishes may be used, it is better to have your wishes written down. Spoken wishes can be misunderstood, or not followed. Treatments may be given even if you do not want them. An advance directive may make it easier for your family to make difficult choices about your care. Weight Management   Why it is important to manage your weight:  Being overweight increases your risk of health conditions such as heart disease, high blood pressure, type 2 diabetes, and certain types of cancer. It can also increase your risk for osteoarthritis, sleep apnea, and other respiratory problems. Aim for a slow, steady weight loss. Even a small amount of weight loss can lower your risk of health problems. How to lose weight safely:  A safe and healthy way to lose weight is to eat fewer calories and get regular exercise. You can lose up about 1 pound a week by decreasing the number of calories you eat by 500 calories each day. Healthy meal plan for weight management:  A healthy meal plan includes a variety of foods, contains fewer calories, and helps you stay healthy. A healthy meal plan includes the following:  · Eat whole-grain foods more often. A healthy meal plan should contain fiber. Fiber is the part of grains, fruits, and vegetables that is not broken down by your body. Whole-grain foods are healthy and provide extra fiber in your diet. Some examples of whole-grain foods are whole-wheat breads and pastas, oatmeal, brown rice, and bulgur. · Eat a variety of vegetables every day. Include dark, leafy greens such as spinach, kale, jesusita greens, and mustard greens. Eat yellow and orange vegetables such as carrots, sweet potatoes, and winter squash. · Eat a variety of fruits every day. Choose fresh or canned fruit (canned in its own juice or light syrup) instead of juice. Fruit juice has very little or no fiber. · Eat low-fat dairy foods. Drink fat-free (skim) milk or 1% milk. Eat fat-free yogurt and low-fat cottage cheese. Try low-fat cheeses such as mozzarella and other reduced-fat cheeses. · Choose meat and other protein foods that are low in fat. Choose beans or other legumes such as split peas or lentils.  Choose fish, skinless poultry (chicken or turkey), or lean cuts of red meat (beef or pork). Before you cook meat or poultry, cut off any visible fat. · Use less fat and oil. Try baking foods instead of frying them. Add less fat, such as margarine, sour cream, regular salad dressing and mayonnaise to foods. Eat fewer high-fat foods. Some examples of high-fat foods include french fries, doughnuts, ice cream, and cakes. · Eat fewer sweets. Limit foods and drinks that are high in sugar. This includes candy, cookies, regular soda, and sweetened drinks. Exercise:  Exercise at least 30 minutes per day on most days of the week. Some examples of exercise include walking, biking, dancing, and swimming. You can also fit in more physical activity by taking the stairs instead of the elevator or parking farther away from stores. Ask your healthcare provider about the best exercise plan for you. © Copyright [employer] 2018 Information is for End User's use only and may not be sold, redistributed or otherwise used for commercial purposes.  All illustrations and images included in CareNotes® are the copyrighted property of [employer] Inc[employer] or [address] [place] [address]

## 2023-08-29 ENCOUNTER — ONCOLOGY SURVIVORSHIP (OUTPATIENT)
Dept: GYNECOLOGIC ONCOLOGY | Facility: CLINIC | Age: 68
End: 2023-08-29
Payer: MEDICARE

## 2023-08-29 VITALS
OXYGEN SATURATION: 97 % | BODY MASS INDEX: 25.49 KG/M2 | SYSTOLIC BLOOD PRESSURE: 122 MMHG | DIASTOLIC BLOOD PRESSURE: 70 MMHG | HEIGHT: 61 IN | RESPIRATION RATE: 16 BRPM | WEIGHT: 135 LBS | HEART RATE: 63 BPM

## 2023-08-29 DIAGNOSIS — Z12.31 BREAST CANCER SCREENING BY MAMMOGRAM: ICD-10-CM

## 2023-08-29 DIAGNOSIS — Z13.820 OSTEOPOROSIS SCREENING: ICD-10-CM

## 2023-08-29 DIAGNOSIS — N95.8 OTHER SPECIFIED MENOPAUSAL AND PERIMENOPAUSAL DISORDERS: ICD-10-CM

## 2023-08-29 DIAGNOSIS — C55 UTERINE CARCINOSARCOMA (HCC): Primary | ICD-10-CM

## 2023-08-29 PROCEDURE — 99214 OFFICE O/P EST MOD 30 MIN: CPT | Performed by: OBSTETRICS & GYNECOLOGY

## 2023-08-29 NOTE — ASSESSMENT & PLAN NOTE
Patient is a very pleasant 68-year-old female with a history of stage Ia carcinosarcoma of the endometrium status post robotic hysterectomy bilateral salpingo-oophorectomy sentinel lymph node mapping and biopsy 6 cycles of carboplatinum paclitaxel and vaginal brachytherapy. She remained stable without evidence of recurrence of disease. We have discussed survivorship issues appropriate consults were obtained. The patient will follow-up in 3 months for repeat evaluation.

## 2023-08-29 NOTE — PROGRESS NOTES
Assessment/Plan:  Endometrial cancer:  Patient is a very pleasant 18-year-old female with a history of stage Ia carcinosarcoma of the endometrium status post robotic hysterectomy bilateral salpingo-oophorectomy sentinel lymph node mapping and biopsy 6 cycles of carboplatinum paclitaxel and vaginal brachytherapy. She remained stable without evidence of recurrence of disease. We have discussed survivorship issues appropriate consults were obtained. The patient will follow-up in 3 months for repeat evaluation. Diagnoses and all orders for this visit:    Uterine carcinosarcoma ([address])  -     CT chest abdomen pelvis wo contrast; Future  -     BUN; Future  -     Creatinine, serum; Future    Osteoporosis screening  -     DXA bone density spine hip and pelvis; Future    Breast cancer screening by mammogram  -     Mammo screening bilateral w 3d & cad; Future    Other specified menopausal and perimenopausal disorders  -     DXA bone density spine hip and pelvis; Future          REASON FOR VISIT:   Survivorship/Surveillance Visit stage Ia carcinosarcoma of the endometrium  Problem:  Cancer Staging   Uterine carcinosarcoma [facility])  Staging form: Corpus Uteri - Carcinoma, AJCC 8th Edition  - Clinical stage from 8/26/2022: FIGO Stage IA (cT1a, cN0, cM0) - Signed by Patsy Joyner MD on 8/26/2022        Previous therapy:  Oncology History   Uterine carcinosarcoma ([address])   7/20/2022 Biopsy    Endometrium, EMB:  - Markedly atypical glandular and stromal elements, suspicious for carcinosarcoma. See comment. Comment: There are superficial strips of atypical glandular elements which are diffusely positive for p53 by immunohistochemistry. Additionally, the the stromal elements demonstrate significant nuclear pleomorphism. The overall findings are suspicious for carcinosarcoma.      8/18/2022 Initial Diagnosis    Uterine carcinosarcoma ([address])     8/26/2022 -  Cancer Staged    Staging form: Corpus Uteri - Carcinoma, AJCC 8th Edition  - Clinical stage from 8/26/2022: FIGO Stage IA (cT1a, cN0, cM0) - Signed by Laverne Patrick MD on 8/26/2022  Histopathologic type: Carcinosarcoma  Stage prefix: Initial diagnosis  Histologic grade (G): G3  Histologic grading system: 3 grade system       8/2022 Surgery    Patient and did went robotic hysterectomy bilateral salpingo-oophorectomy sentinel lymph node mapping and biopsy for stage I A non invasive carcinosarcoma of the endometrium. The uterus did fracture within the vagina but no leakage into the abdomen was noted.      9/19/2022 -  Chemotherapy    palonosetron (ALOXI), 0.25 mg, Intravenous, Once, 6 of 6 cycles  Administration: 0.25 mg (9/19/2022), 0.25 mg (10/10/2022), 0.25 mg (10/31/2022), 0.25 mg (11/21/2022), 0.25 mg (12/12/2022), 0.25 mg (1/3/2023)  pegfilgrastim (NEULASTA), 6 mg, Subcutaneous, Once, 5 of 5 cycles  Administration: 6 mg (10/11/2022), 6 mg (11/1/2022), 6 mg (11/22/2022), 6 mg (12/13/2022), 6 mg (1/4/2023)  fosaprepitant (EMEND) IVPB, 150 mg, Intravenous, Once, 6 of 6 cycles  Administration: 150 mg (9/19/2022), 150 mg (10/10/2022), 150 mg (10/31/2022), 150 mg (11/21/2022), 150 mg (12/12/2022), 150 mg (1/3/2023)  CARBOplatin (PARAPLATIN) IVPB (GOG AUC DOSING), 572.4 mg, Intravenous, Once, 6 of 6 cycles  Administration: 572.4 mg (9/19/2022), 579.6 mg (10/10/2022), 579.6 mg (10/31/2022), 575.4 mg (11/21/2022), 564 mg (12/12/2022), 564 mg (1/3/2023)  PACLItaxel (TAXOL) chemo IVPB, 175 mg/m2 = 274.8 mg, Intravenous, Once, 6 of 6 cycles  Administration: 274.8 mg (9/19/2022), 278.4 mg (10/10/2022), 278.4 mg (10/31/2022), 276.6 mg (11/21/2022), 273 mg (12/12/2022), 273 mg (1/3/2023)     2/1/2023 - 2/10/2023 Radiation    Treatments:  Course: C1 HDR 2.5cmCyl  Plan ID Energy Source Fractions Dose per Fraction (cGy) Rx Dose Total Dose Delivered (cGy) Elapsed Days   HDR2.5cm cylinder diameter  Ir-192 3 / 3 700 5mm from cylinder surface 2,100 9    Treatment Dates:  2/1/2023, 2/6/2023, 2/10/2023. Patient ID: Ivana Curran is a 77 y.o. female  Patient is a very pleasant 22-year-old female with a history of stage Ia carcinosarcoma of the endometrium. She underwent robotic hysterectomy BSO and staging followed by 6 cycles of carboplatinum paclitaxel chemotherapy followed by vaginal brachytherapy and attained a complete remission. Her last PET scan was approximately 3 months ago and was unremarkable. The patient presents today for surveillance and survivorship visit. Today, the patient is doing well. She denies significant abdominal pain, pelvic pain, nausea, vomiting, constipation, diarrhea, fevers, chills, or vaginal bleeding. The patient has lapsed in her routine screening. She has had a Hemoccult set up for at home by her family physician and prefers this over colonoscopy. She has also had a mammogram ordered but never scheduled. She has never had a DEXA scan. Review of Systems   Constitutional: Negative. HENT: Negative. Eyes: Negative. Respiratory: Negative. Cardiovascular: Negative. Gastrointestinal: Negative. Endocrine: Negative. Genitourinary: Negative. Musculoskeletal: Negative. Skin: Negative. Neurological: Negative. Hematological: Negative. Psychiatric/Behavioral: Negative. Objective:    Blood pressure 122/70, pulse 63, resp. rate 16, height 5' 1.3" (1.557 m), weight 61.2 kg (135 lb), SpO2 97 %, not currently breastfeeding. Body mass index is 25.26 kg/m². Body surface area is 1.6 meters squared. Physical Exam  Constitutional:       Appearance: She is well-developed. HENT:      Head: Normocephalic and atraumatic. Neck:      Thyroid: No thyromegaly. Cardiovascular:      Rate and Rhythm: Normal rate and regular rhythm. Heart sounds: Normal heart sounds. Pulmonary:      Effort: Pulmonary effort is normal.      Breath sounds: Normal breath sounds.    Abdominal:      General: Bowel sounds are normal. Palpations: Abdomen is soft. Comments: Well healed laparoscopic incisions. Genitourinary:     Comments: -Normal external female genitalia, normal Bartholin's and Ostrander's glands                  -Normal midline urethral meatus. No lesions notes                  -Bladder without fullness mass or tenderness                  -Vagina without lesion or discharge No significant cystocele or rectocele noted                  -Cervix surgically absent                  -Uterus surgically absent                  -Adnexae surgically absent                  - Anus without fissure of lesion    Musculoskeletal:         General: Normal range of motion. Cervical back: Normal range of motion and neck supple. Lymphadenopathy:      Cervical: No cervical adenopathy. Skin:     General: Skin is warm and dry. Neurological:      Mental Status: She is alert and oriented to person, place, and time.    Psychiatric:         Behavior: Behavior normal.             Discussed symptoms related to disease recurrence, Yes    Evaluated for late effects related to cancer treatment, Yes    Screening current for breast cancer, No mammogram ordered    Screening current for colon cancer, Yes, describe: Patient has Hemoccults that for at home    Management of obesity addressed, No not required    Screening current for osteoporosis, No DEXA ordered    Oncology Treatment Summary reviewed with patient and copy provided, Yes    Referral placed for psychosocial evaluation/screening to oncology social work Yes

## 2023-10-03 ENCOUNTER — HOSPITAL ENCOUNTER (OUTPATIENT)
Age: 68
Discharge: HOME | End: 2023-10-03
Payer: MEDICARE

## 2023-10-03 VITALS — HEIGHT: 60 IN | BODY MASS INDEX: 26.59 KG/M2

## 2023-10-03 DIAGNOSIS — Z12.31 BREAST CANCER SCREENING BY MAMMOGRAM: ICD-10-CM

## 2023-10-03 DIAGNOSIS — N95.8 OTHER SPECIFIED MENOPAUSAL AND PERIMENOPAUSAL DISORDERS: ICD-10-CM

## 2023-10-03 DIAGNOSIS — Z13.820 OSTEOPOROSIS SCREENING: ICD-10-CM

## 2023-10-03 PROCEDURE — 77080 DXA BONE DENSITY AXIAL: CPT

## 2023-10-03 PROCEDURE — 77067 SCR MAMMO BI INCL CAD: CPT

## 2023-10-03 PROCEDURE — 77063 BREAST TOMOSYNTHESIS BI: CPT

## 2023-10-31 ENCOUNTER — CLINICAL SUPPORT (OUTPATIENT)
Dept: RADIATION ONCOLOGY | Facility: CLINIC | Age: 68
End: 2023-10-31
Attending: RADIOLOGY
Payer: MEDICARE

## 2023-10-31 VITALS
SYSTOLIC BLOOD PRESSURE: 120 MMHG | RESPIRATION RATE: 16 BRPM | OXYGEN SATURATION: 96 % | WEIGHT: 138 LBS | BODY MASS INDEX: 27.18 KG/M2 | HEART RATE: 76 BPM | DIASTOLIC BLOOD PRESSURE: 70 MMHG | TEMPERATURE: 97.9 F

## 2023-10-31 DIAGNOSIS — C55 UTERINE CARCINOSARCOMA (HCC): Primary | ICD-10-CM

## 2023-10-31 DIAGNOSIS — C54.1 MALIGNANT NEOPLASM OF ENDOMETRIUM (HCC): ICD-10-CM

## 2023-10-31 PROCEDURE — 99213 OFFICE O/P EST LOW 20 MIN: CPT | Performed by: RADIOLOGY

## 2023-10-31 PROCEDURE — 99211 OFF/OP EST MAY X REQ PHY/QHP: CPT | Performed by: RADIOLOGY

## 2023-10-31 NOTE — PROGRESS NOTES
Jose L Robert 1955 is a 77 y.o. female  with a history of stage IA carcinosarcoma status post resection. Pathology showed 6.3cm tumor, but depth could not be assessed due to uterus split during resection within the vagina. She completed adjuvant chemotherapy and then vaginal cuff radiation on 2/10/23. She was last seen 3/6/23 and presents today for follow up.    23 PET CT  1. No hypermetabolic metastases demonstrated. 23 Dr. Catarina Campo  She remained stable without evidence of recurrence of disease  Follow up 3 months  Order CT c/a/p    Upcomin23 CT  23 Dr. Catarina Campo    Follow up visit     Oncology History   Uterine carcinosarcoma ([address])   2022 Biopsy    Endometrium, EMB:  - Markedly atypical glandular and stromal elements, suspicious for carcinosarcoma. See comment. Comment: There are superficial strips of atypical glandular elements which are diffusely positive for p53 by immunohistochemistry. Additionally, the the stromal elements demonstrate significant nuclear pleomorphism. The overall findings are suspicious for carcinosarcoma. 2022 Initial Diagnosis    Uterine carcinosarcoma ([address])     2022 -  Cancer Staged    Staging form: Corpus Uteri - Carcinoma, AJCC 8th Edition  - Clinical stage from 2022: FIGO Stage IA (cT1a, cN0, cM0) - Signed by Jerome James MD on 2022  Histopathologic type: Carcinosarcoma  Stage prefix: Initial diagnosis  Histologic grade (G): G3  Histologic grading system: 3 grade system       2022 Surgery    Patient and did went robotic hysterectomy bilateral salpingo-oophorectomy sentinel lymph node mapping and biopsy for stage I A non invasive carcinosarcoma of the endometrium. The uterus did fracture within the vagina but no leakage into the abdomen was noted.      2022 -  Chemotherapy    palonosetron (ALOXI), 0.25 mg, Intravenous, Once, 6 of 6 cycles  Administration: 0.25 mg (2022), 0.25 mg (10/10/2022), 0.25 mg (10/31/2022), 0.25 mg (11/21/2022), 0.25 mg (12/12/2022), 0.25 mg (1/3/2023)  pegfilgrastim (NEULASTA), 6 mg, Subcutaneous, Once, 5 of 5 cycles  Administration: 6 mg (10/11/2022), 6 mg (11/1/2022), 6 mg (11/22/2022), 6 mg (12/13/2022), 6 mg (1/4/2023)  fosaprepitant (EMEND) IVPB, 150 mg, Intravenous, Once, 6 of 6 cycles  Administration: 150 mg (9/19/2022), 150 mg (10/10/2022), 150 mg (10/31/2022), 150 mg (11/21/2022), 150 mg (12/12/2022), 150 mg (1/3/2023)  CARBOplatin (PARAPLATIN) IVPB (GO AUC DOSING), 572.4 mg, Intravenous, Once, 6 of 6 cycles  Administration: 572.4 mg (9/19/2022), 579.6 mg (10/10/2022), 579.6 mg (10/31/2022), 575.4 mg (11/21/2022), 564 mg (12/12/2022), 564 mg (1/3/2023)  PACLItaxel (TAXOL) chemo IVPB, 175 mg/m2 = 274.8 mg, Intravenous, Once, 6 of 6 cycles  Administration: 274.8 mg (9/19/2022), 278.4 mg (10/10/2022), 278.4 mg (10/31/2022), 276.6 mg (11/21/2022), 273 mg (12/12/2022), 273 mg (1/3/2023)     2/1/2023 - 2/10/2023 Radiation    Treatments:  Course: C1 HDR 2.5cmCyl  Plan ID Energy Source Fractions Dose per Fraction (cGy) Rx Dose Total Dose Delivered (cGy) Elapsed Days   HDR2.5cm cylinder diameter  Ir-192 3 / 3 700 5mm from cylinder surface 2,100 9    Treatment Dates:  2/1/2023, 2/6/2023, 2/10/2023. Review of Systems:  Review of Systems   Constitutional:  Positive for fatigue. HENT: Negative. Eyes:         Wears glasses   Respiratory: Negative. Cardiovascular: Negative. Gastrointestinal: Negative. Genitourinary: Negative. Negative for frequency, pelvic pain, urgency, vaginal bleeding, vaginal discharge and vaginal pain. Musculoskeletal: Negative. Skin: Negative. Allergic/Immunologic: Negative. Neurological:  Positive for numbness (B/L fingers and toes improving). Hematological: Negative. Psychiatric/Behavioral: Negative.          Clinical Trial: no        Health Maintenance   Topic Date Due    Colorectal Cancer Screening  Never done    COVID-19 Vaccine (4 - Moderna series) 02/04/2022    Depression Screening  03/06/2024    Fall Risk  08/10/2024    Urinary Incontinence Screening  08/10/2024    Medicare Annual Wellness Visit (AWV)  08/10/2024    BMI: Followup Plan  08/10/2024    BMI: Adult  08/29/2024    Breast Cancer Screening: Mammogram  10/03/2024    Hepatitis C Screening  Completed    Osteoporosis Screening  Completed    Pneumococcal Vaccine: 65+ Years  Completed    Influenza Vaccine  Completed    HIB Vaccine  Aged Out    IPV Vaccine  Aged Out    Hepatitis A Vaccine  Aged Out    Meningococcal ACWY Vaccine  Aged Out    HPV Vaccine  Aged Out     Patient Active Problem List   Diagnosis    Screening mammogram, encounter for    Situational anxiety    Arthralgia    Hyperlipidemia, mixed    Anxiety    Malignant neoplasm of endometrium ([address])    History of robot-assisted laparoscopic hysterectomy    Uterine carcinosarcoma ([address])     Past Medical History:   Diagnosis Date    Anemia     Anxiety     Hiatal hernia     Hypercholesterolemia     Uterine cancer ([address])      Past Surgical History:   Procedure Laterality Date    COLONOSCOPY      complete     DILATION AND CURETTAGE OF UTERUS      x2     FL GUIDED CENTRAL VENOUS ACCESS DEVICE INSERTION  9/6/2022    LA INSJ TUNNELED CTR VAD W/SUBQ PORT AGE 5 YR/> N/A 9/6/2022    Procedure: INSERTION VENOUS PORT ( PORT-A-CATH) IR;  Surgeon: Mahendra Alvares DO;  Location: [facility];  Service: Interventional Radiology    LA LAPS TOTAL HYSTERECT 250 GM/< W/RMVL TUBE/OVARY N/A 8/18/2022    Procedure: HYSTERECTOMY LAPAROSCOPIC TOTAL ([facility]) W/ ROBOTICS, BILATERAL SALPINGO-OOPHORECTOMY, SENTINEL LYMPH NODE MAPPING AND BIOPSY;  Surgeon: Fantasma Cedeno MD;  Location: [facility];  Service: Gynecology Oncology    LA RMVL ANN-MARIE CTR VAD W/SUBQ PORT/ CTR/PRPH INSJ N/A 1/26/2023    Procedure: REMOVAL VENOUS PORT (PORT-A-CATH)IR;  Surgeon: Mahendra Alvares DO;  Location: [facility];  Service: Interventional Radiology     Family History   Problem Relation Age of Onset    No Known Problems Mother     Lung cancer Father     Brain cancer Father     No Known Problems Sister     No Known Problems Maternal Grandmother     No Known Problems Paternal Grandmother     No Known Problems Maternal Aunt     No Known Problems Maternal Aunt     No Known Problems Maternal Aunt     No Known Problems Maternal Aunt     No Known Problems Maternal Aunt     No Known Problems Maternal Aunt     No Known Problems Maternal Aunt     Breast cancer Cousin 29    Arthritis Family     Hypertension Family         essential      Hyperlipidemia Family     Endometrial cancer Neg Hx     Ovarian cancer Neg Hx     Colon cancer Neg Hx     Anesthesia problems Neg Hx      Social History     Socioeconomic History    Marital status:      Spouse name: Not on file    Number of children: Not on file    Years of education: Not on file    Highest education level: Not on file   Occupational History    Not on file   Tobacco Use    Smoking status: Never    Smokeless tobacco: Never    Tobacco comments:     Never a smoker or use of any tobacco products per pts son    Vaping Use    Vaping Use: Never used   Substance and Sexual Activity    Alcohol use: No     Comment: No use of alcohol at this time - former use only social use    Drug use: No     Comment: Denies any drug use per pts son    Sexual activity: Not Currently   Other Topics Concern    Not on file   Social History Narrative    Not on file     Social Determinants of Health     Financial Resource Strain: Unknown (8/10/2023)    Overall Financial Resource Strain (CARDIA)     Difficulty of Paying Living Expenses: Patient refused   Food Insecurity: Not on file   Transportation Needs: No Transportation Needs (8/10/2023)    PRAPARE - Transportation     Lack of Transportation (Medical): No     Lack of Transportation (Non-Medical):  No   Physical Activity: Not on file   Stress: Not on file   Social Connections: Not on file   Intimate Partner Violence: Not on file   Housing Stability: Not on file       Current Outpatient Medications:     cholecalciferol (VITAMIN D3) 1,000 units tablet, Take 1,000 Units by mouth daily, Disp: , Rfl:     cyanocobalamin (VITAMIN B-12) 100 MCG tablet, Take 100 mcg by mouth daily, Disp: , Rfl:     escitalopram (LEXAPRO) 10 mg tablet, Take 1 tablet (10 mg total) by mouth daily, Disp: 90 tablet, Rfl: 3    loratadine-pseudoephedrine (CLARITIN-D 24-HOUR)  mg per 24 hr tablet, Take 1 tablet by mouth daily, Disp: , Rfl:     LORazepam (ATIVAN) 1 mg tablet, Take 1 tablet (1 mg total) by mouth every 6 (six) hours as needed for anxiety (or nausea), Disp: 36 tablet, Rfl: 1    Multiple Vitamins-Minerals (multivitamin with minerals) tablet, Take 1 tablet by mouth daily, Disp: , Rfl:     Multiple Vitamins-Minerals (OCUVITE PO), Take by mouth, Disp: , Rfl:     naloxone (NARCAN) 4 mg/0.1 mL nasal spray, Administer 1 spray into a nostril. If no response after 2-3 minutes, give another dose in the other nostril using a new spray. (Patient not taking: Reported on 8/29/2023), Disp: 1 each, Rfl: 1    ondansetron (ZOFRAN) 8 mg tablet, TAKE 1 TABLET (8 MG TOTAL) BY MOUTH EVERY 8 (EIGHT) HOURS AS NEEDED FOR NAUSEA OR VOMITING (Patient not taking: Reported on 8/29/2023), Disp: 30 tablet, Rfl: 1    rosuvastatin (CRESTOR) 5 mg tablet, Take 1 tablet (5 mg total) by mouth daily, Disp: 90 tablet, Rfl: 3  Allergies   Allergen Reactions    Sulfa Antibiotics Facial Swelling     Redness itching    Acetazolamide Other (See Comments)     1/25/23 Per pts son and pt - unaware of allergy or reaction at this time     There were no vitals filed for this visit.

## 2023-10-31 NOTE — PROGRESS NOTES
Follow-up - Radiation Oncology   Niko Curran 1955 77 y.o. 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 o.woman with a history of stage IA carcinosarcoma status post resection. Pathology showed 6.3cm tumor, but depth could not be assessed due to uterus split during resection within the vagina. She completed adjuvant chemotherapy and then vaginal cuff radiation on 2/10/23. She was last seen 3/6/23 and presents today for follow up.     23 PET CT  1. No hypermetabolic metastases demonstrated. 23 Dr. Matt Batista  She remained stable without evidence of recurrence of disease  Follow up 3 months  Order CT c/a/p    The patient states that she generally feels well. She denies vaginal discharge or bleeding. She denies any diarrhea or rectal bleeding. She denies any significant urinary symptoms including hematuria, dysuria, or urinary continence. She denies any abdominal or pelvic cramping. She denies any lower extremity edema. She denies any nausea or vomiting. She has been using her vaginal dilator roughly once per week. She is compliant with her follow-up with GYN oncology. Upcomin23 CT  23 Dr. Leslye Allan   Oncology History   Uterine carcinosarcoma ([address])   2022 Biopsy    Endometrium, EMB:  - Markedly atypical glandular and stromal elements, suspicious for carcinosarcoma. See comment. Comment: There are superficial strips of atypical glandular elements which are diffusely positive for p53 by immunohistochemistry.   Additionally, the the stromal elements demonstrate significant nuclear pleomorphism. The overall findings are suspicious for carcinosarcoma. 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 The uterus did fracture within the vagina but no leakage into the abdomen was noted.      9/19/2022 -  Chemotherapy    palonosetron (ALOXI), 0.25 mg, Intravenous, Once, 6 of 6 cycles  Administration: 0.25 mg (9/19/2022), 0.25 mg (10/10/2022), 0.25 mg (10/31/2022), 0.25 mg (11/21/2022), 0.25 mg (12/12/2022), 0.25 mg (1/3/2023)  pegfilgrastim (NEULASTA), 6 mg, Subcutaneous, Once, 5 of 5 cycles  Administration: 6 mg (10/11/2022), 6 mg (11/1/2022), 6 mg (11/22/2022), 6 mg (12/13/2022), 6 mg (1/4/2023)  fosaprepitant (EMEND) IVPB, 150 mg, Intravenous, Once, 6 of 6 cycles  Administration: 150 mg (9/19/2022), 150 mg (10/10/2022), 150 mg (10/31/2022), 150 mg (11/21/2022), 150 mg (12/12/2022), 150 mg (1/3/2023)  CARBOplatin (PARAPLATIN) IVPB (GOG AUC DOSING), 572.4 mg, Intravenous, Once, 6 of 6 cycles  Administration: 572.4 mg (9/19/2022), 579.6 mg (10/10/2022), 579.6 mg (10/31/2022), 575.4 mg (11/21/2022), 564 mg (12/12/2022), 564 mg (1/3/2023)  PACLItaxel (TAXOL) chemo IVPB, 175 mg/m2 = 274.8 mg, Intravenous, Once, 6 of 6 cycles  Administration: 274.8 mg (9/19/2022), 278.4 mg (10/10/2022), 278.4 mg (10/31/2022), 276.6 mg (11/21/2022), 273 mg (12/12/2022), 273 mg (1/3/2023)     2/1/2023 - 2/10/2023 Radiation    Treatments: Course: C1 HDR 2.5cmCyl  Plan ID Energy Source Fractions Dose per Fraction (cGy) Rx Dose Total Dose Delivered (cGy) Elapsed Days   HDR2.5cm cylinder diameter  Ir-192 3 / 3 700 5mm from cylinder surface 2,100 9    Treatment Dates:  2/1/2023, 2/6/2023, 2/10/2023.           Past Medical History:   Diagnosis Date    Anemia     Anxiety     Hiatal hernia     Hypercholesterolemia     Uterine cancer [facility])      Past Surgical History:   Procedure Laterality Date    COLONOSCOPY      complete     DILATION AND CURETTAGE OF UTERUS      x2     FL GUIDED CENTRAL VENOUS ACCESS DEVICE INSERTION  9/6/2022    CA INSJ TUNNELED CTR VAD W/SUBQ PORT AGE 5 YR/> N/A 9/6/2022    Procedure: INSERTION VENOUS PORT ( PORT-A-CATH) IR;  Surgeon: Shemar Christianson DO;  Location[facility];  Service: Interventional Radiology    CA LAPS TOTAL HYSTERECT 250 GM/< W/RMVL TUBE/OVARY N/A 8/18/2022    Procedure: HYSTERECTOMY LAPAROSCOPIC TOTAL ([address] [facility]) W/ ROBOTICS, BILATERAL SALPINGO-OOPHORECTOMY, SENTINEL LYMPH NODE MAPPING AND BIOPSY;  Surgeon: Mikal Bhatti MD;  Location: [facility];  Service: Gynecology Oncology    CA RMVL ANN-MARIE CTR VAD W/SUBQ PORT/ CTR/PRPH INSJ N/A 1/26/2023    Procedure: REMOVAL VENOUS PORT (PORT-A-CATH)IR;  Surgeon: Shemar Christianson DO;  Location: [facility];  Service: Interventional Radiology       Social History   Social History     Substance and Sexual Activity   Alcohol Use No    Comment: No use of alcohol at this time - former use only social use     Social History     Substance and Sexual Activity   Drug Use No    Comment: Denies any drug use per pts son     Social History     Tobacco Use   Smoking Status Never   Smokeless Tobacco Never   Tobacco Comments    Never a smoker or use of any tobacco products per pts son          Meds/Allergies     Current Outpatient Medications:     cholecalciferol (VITAMIN D3) 1,000 units tablet, Take 1,000 Units by mouth daily, Disp: , Rfl:     cyanocobalamin (VITAMIN B-12) 100 MCG tablet, Take 100 mcg by mouth daily, Disp: , Rfl:     escitalopram (LEXAPRO) 10 mg tablet, Take 1 tablet (10 mg total) by mouth daily, Disp: 90 tablet, Rfl: 3    loratadine-pseudoephedrine (CLARITIN-D 24-HOUR)  mg per 24 hr tablet, Take 1 tablet by mouth daily, Disp: , Rfl:     LORazepam (ATIVAN) 1 mg tablet, Take 1 tablet (1 mg total) by mouth every 6 (six) hours as needed for anxiety (or nausea), Disp: 36 tablet, Rfl: 1    Multiple Vitamins-Minerals (multivitamin with minerals) tablet, Take 1 tablet by mouth daily, Disp: , Rfl:     Multiple Vitamins-Minerals (OCUVITE PO), Take by mouth, Disp: , Rfl:     rosuvastatin (CRESTOR) 5 mg tablet, Take 1 tablet (5 mg total) by mouth daily, Disp: 90 tablet, Rfl: 3    naloxone (NARCAN) 4 mg/0.1 mL nasal spray, Administer 1 spray into a nostril. If no response after 2-3 minutes, give another dose in the other nostril using a new spray. (Patient not taking: Reported on 8/29/2023), Disp: 1 each, Rfl: 1    ondansetron (ZOFRAN) 8 mg tablet, TAKE 1 TABLET (8 MG TOTAL) BY MOUTH EVERY 8 (EIGHT) HOURS AS NEEDED FOR NAUSEA OR VOMITING (Patient not taking: Reported on 8/29/2023), Disp: 30 tablet, Rfl: 1  Allergies   Allergen Reactions    Sulfa Antibiotics Facial Swelling     Redness itching    Acetazolamide Other (See Comments)     1/25/23 Per pts son and pt - unaware of allergy or reaction at this time         Review of Systems   Constitutional:  Positive for fatigue. HENT: Negative. Eyes:         Wears glasses   Respiratory: Negative. Cardiovascular: Negative. Gastrointestinal: Negative. Genitourinary: Negative. Negative for frequency, pelvic pain, urgency, vaginal bleeding, vaginal discharge and vaginal pain. Musculoskeletal: Negative. Skin: Negative. Allergic/Immunologic: Negative. Neurological:  Positive for numbness (B/L fingers and toes improving). Hematological: Negative. Psychiatric/Behavioral: Negative.            OBJECTIVE:   /70 Pulse 76   Temp 97.9 °F (36.6 °C)   Resp 16   Wt 62.6 kg (138 lb)   SpO2 96%   BMI 27.18 kg/m²   Karnofsky: 100 - Fully active, able to carry on all pre-disease performed without restriction    Physical Exam  Vitals and nursing note reviewed. Constitutional:       General: She is not in acute distress. Appearance: She is well-developed. Cardiovascular:      Rate and Rhythm: Normal rate and regular rhythm. Pulmonary:      Breath sounds: No wheezing, rhonchi or rales. Abdominal:      General: There is no distension. Palpations: Abdomen is soft. Tenderness: There is no abdominal tenderness. Genitourinary:     Comments: GYN examination demonstrates normal external female genitalia. The vaginal cuff is closed. There is significant foreshortening. There are no adhesions or stenosis appreciated. There are no palpable nodules. Speculum examination demonstrates no suspicious lesions. Musculoskeletal:      Right lower leg: No edema. Left lower leg: No edema. Lymphadenopathy:      Cervical: No cervical adenopathy. Upper Body:      Right upper body: No supraclavicular adenopathy. Left upper body: No supraclavicular adenopathy. Lower Body: No right inguinal adenopathy. No left inguinal adenopathy. Neurological:      Mental Status: She is alert and oriented to person, place, and time. Gait: Gait normal.            RESULTS  Imaging Studies:  Mammo screening bilateral w 3d & cad    Result Date: 10/3/2023  Narrative: DIAGNOSIS: Breast cancer screening by mammogram TECHNIQUE: Digital screening mammography was performed. Computer Aided Detection (CAD) analyzed all applicable images. COMPARISONS: Multiple prior exams dated between 6/21/2006 and 8/15/2019. RELEVANT HISTORY: Family Breast Cancer History: History of breast cancer in Cousin. Family Medical History: Family medical history includes breast cancer in cousin.  Personal History: Hormone history includes birth control. No known relevant surgical history. No known relevant medical history. The patient is scheduled in a reminder system for screening mammography. 8-10% of cancers will be missed on mammography. Management of a palpable abnormality must be based on clinical grounds. Patients will be notified of their results via letter from our facility. Accredited by [employer] of [employer] and FDA. RISK ASSESSMENT: 5 Year Tyrer-Cuzick: 0.59 % 10 Year Tyrer-Cuzick: 1.26 % Lifetime Tyrer-Cuzick: 2.02 % TISSUE DENSITY: There are scattered areas of fibroglandular density. INDICATION: Maria Victoria Johns is a 77 y.o. female presenting for screening mammography. FINDINGS: Bilateral There are no suspicious masses, grouped microcalcifications or areas of unexplained architectural distortion. The skin and nipple areolar complex are unremarkable. Benign-appearing calcifications are noted in each breast.     Impression: No mammographic evidence of malignancy. ASSESSMENT/BI-RADS CATEGORY: Left: 2 - Benign Right: 2 - Benign Overall: 2 - Benign RECOMMENDATION:      - Routine screening mammogram in 1 year for both breasts. Workstation ID: [id number]       PET/CT SCAN 4/18/23   FINDINGS:    VISUALIZED BRAIN:     No acute abnormalities are seen. HEAD/NECK:     There is a physiologic distribution of FDG. No FDG avid cervical adenopathy is seen. CT images: Unremarkable. CHEST:     No FDG avid soft tissue lesions are seen. CT images: Coronary artery calcifications. Large hiatal hernia. ABDOMEN:     No FDG avid soft tissue lesions are seen. CT images: Unremarkable. PELVIS:   No FDG avid soft tissue lesions are seen. CT images: Hysterectomy. OSSEOUS STRUCTURES:  Diffusely decreased marrow activity. No FDG avid lesions are seen. CT images: No significant findings. IMPRESSION:  1. No hypermetabolic metastases demonstrated.         Assessment/Plan:  Maria Victoria Johns is a 77 y.o. woman with a history of stage IA carcinosarcoma status post resection. Pathology showed 6.3cm tumor, but depth could not be assessed due to uterus split during resection within the vagina. She completed adjuvant chemotherapy and then vaginal cuff radiation on 2/10/23. She has recovered well from radiation and is currently clinically JAMIE. The patient is noted with significant foreshortening of the vaginal cuff post treatment. We discussed reinitiating regular vaginal dilator use as radiation scarring can progress for many months post treatment. We discussed that further progression can result in significant difficulty for future pelvic examination/surveillance. She has various sizes at home. I would recommend at least 3 times a week and provided instructions of more aggressive regimen if needed. She stated she would plan to restart therapy. We discussed pelvic physical therapy referral, but patient deferred at this time as she plans to start vaginal dilator use again. She will plan to discuss possible therapy if indicated with GYN Oncology in future. She is compliant with GYN Oncology follow-up. I offered her follow-up as needed and she agreed. We will be happy to see her again should the need arise. Leny Lee MD  10/31/2023,9:37 AM    Portions of the record may have been created with voice recognition software. Occasional wrong word or "sound a like" substitutions may have occurred due to the inherent limitations of voice recognition software. Read the chart carefully and recognize, using context, where substitutions have occurred.

## 2023-11-17 ENCOUNTER — TELEPHONE (OUTPATIENT)
Dept: FAMILY MEDICINE CLINIC | Facility: CLINIC | Age: 68
End: 2023-11-17

## 2023-11-17 DIAGNOSIS — R05.1 ACUTE COUGH: Primary | ICD-10-CM

## 2023-11-17 RX ORDER — AZITHROMYCIN 250 MG/1
TABLET, FILM COATED ORAL
Qty: 6 TABLET | Refills: 1 | Status: SHIPPED | OUTPATIENT
Start: 2023-11-17 | End: 2023-11-22

## 2023-11-17 NOTE — TELEPHONE ENCOUNTER
Luis F Tucker called, she is having sinus congestion/post nasal drip, she took a covid test and it was negative. Patient is requesting a Z-pack with [facility] to help knock this out of her.

## 2023-11-27 DIAGNOSIS — F41.9 ANXIETY: ICD-10-CM

## 2023-11-27 RX ORDER — ESCITALOPRAM OXALATE 10 MG/1
10 TABLET ORAL DAILY
Qty: 90 TABLET | Refills: 3 | Status: SHIPPED | OUTPATIENT
Start: 2023-11-27

## 2023-11-28 ENCOUNTER — HOSPITAL ENCOUNTER (OUTPATIENT)
Dept: CT IMAGING | Facility: HOSPITAL | Age: 68
Discharge: HOME | End: 2023-11-28
Attending: OBSTETRICS & GYNECOLOGY
Payer: MEDICARE

## 2023-11-28 DIAGNOSIS — C55 UTERINE CARCINOSARCOMA (HCC): ICD-10-CM

## 2023-11-28 PROCEDURE — 74176 CT ABD & PELVIS W/O CONTRAST: CPT

## 2023-11-28 PROCEDURE — 71250 CT THORAX DX C-: CPT

## 2023-11-28 PROCEDURE — G1004 CDSM NDSC: HCPCS

## 2023-12-12 ENCOUNTER — ONCOLOGY SURVIVORSHIP (OUTPATIENT)
Dept: GYNECOLOGIC ONCOLOGY | Facility: CLINIC | Age: 68
End: 2023-12-12
Payer: MEDICARE

## 2023-12-12 VITALS
SYSTOLIC BLOOD PRESSURE: 110 MMHG | HEIGHT: 59 IN | WEIGHT: 134.6 LBS | HEART RATE: 80 BPM | DIASTOLIC BLOOD PRESSURE: 72 MMHG | BODY MASS INDEX: 27.13 KG/M2 | OXYGEN SATURATION: 98 %

## 2023-12-12 DIAGNOSIS — C55 UTERINE CARCINOSARCOMA (HCC): ICD-10-CM

## 2023-12-12 DIAGNOSIS — C54.1 ENDOMETRIAL SARCOMA (HCC): Primary | ICD-10-CM

## 2023-12-12 PROCEDURE — 99214 OFFICE O/P EST MOD 30 MIN: CPT | Performed by: OBSTETRICS & GYNECOLOGY

## 2023-12-12 NOTE — ASSESSMENT & PLAN NOTE
Patient is very pleasant 66-year-old female with a history of stage Ia carcinosarcoma of the endometrium. She is undergone surgery chemo and radiation and now remains in a clinical remission. She has small 1 mm nodules in the chest.  Will follow-up with CT scan in another 3 months. With regard to survivorship. The only need identified is colorectal screening. Patient has not had a colonoscopy. She is not interested in this time but will move ahead with Cologuard.

## 2023-12-12 NOTE — PROGRESS NOTES
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 She is undergone surgery chemo and radiation and now remains in a clinical remission. She has small 1 mm nodules in the chest.  Will follow-up with CT scan in another 3 months. With regard to survivorship. The only need identified is colorectal screening. Patient has not had a colonoscopy. She is not interested in this time but will move ahead with Cologuard. 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 See comment. Comment: There are superficial strips of atypical glandular elements which are diffusely positive for p53 by immunohistochemistry. Additionally, the the stromal elements demonstrate significant nuclear pleomorphism. The overall findings are suspicious for carcinosarcoma. 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 The uterus did fracture within the vagina but no leakage into the abdomen was noted. 9/19/2022 -  Chemotherapy    palonosetron (ALOXI), 0.25 mg, Intravenous, Once, 6 of 6 cycles  Administration: 0.25 mg (9/19/2022), 0.25 mg (10/10/2022), 0.25 mg (10/31/2022), 0.25 mg (11/21/2022), 0.25 mg (12/12/2022), 0.25 mg (1/3/2023)  pegfilgrastim (NEULASTA), 6 mg, Subcutaneous, Once, 5 of 5 cycles  Administration: 6 mg (10/11/2022), 6 mg (11/1/2022), 6 mg (11/22/2022), 6 mg (12/13/2022), 6 mg (1/4/2023)  fosaprepitant (EMEND) IVPB, 150 mg, Intravenous, Once, 6 of 6 cycles  Administration: 150 mg (9/19/2022), 150 mg (10/10/2022), 150 mg (10/31/2022), 150 mg (11/21/2022), 150 mg (12/12/2022), 150 mg (1/3/2023)  CARBOplatin (PARAPLATIN) IVPB (GOG AUC DOSING), 572.4 mg, Intravenous, Once, 6 of 6 cycles  Administration: 572.4 mg (9/19/2022), 579.6 mg (10/10/2022), 579.6 mg (10/31/2022), 575.4 mg (11/21/2022), 564 mg (12/12/2022), 564 mg (1/3/2023)  PACLItaxel (TAXOL) chemo IVPB, 175 mg/m2 = 274.8 mg, Intravenous, Once, 6 of 6 cycles  Administration: 274.8 mg (9/19/2022), 278.4 mg (10/10/2022), 278.4 mg (10/31/2022), 276.6 mg (11/21/2022), 273 mg (12/12/2022), 273 mg (1/3/2023)     2/1/2023 - 2/10/2023 Radiation    Treatments:  Course: C1 HDR 2.5cmCyl  Plan ID Energy Source Fractions Dose per Fraction (cGy) Rx Dose Total Dose Delivered (cGy) Elapsed Days   HDR2.5cm cylinder diameter  Ir-192 3 / 3 700 5mm from cylinder surface 2,100 9    Treatment Dates:  2/1/2023, 2/6/2023, 2/10/2023.             Patient ID: Svetlana Patel is a 77 y.o. female  Patient is a very pleasant 64-year-old female with a history of stage Ia carcinosarcoma of the uterus status post robot-assisted total laparoscopic hysterectomy bilateral salpingo-oophorectomy sentinel lymph node mapping and biopsy 6 cycles of carboplatin and paclitaxel chemotherapy and vaginal brachytherapy completed in February 2023. Patient presents for follow-up and survivorship visit. She is presently doing well. She has recently undergone a CAT scan which reveals the following:  FINDINGS:     CHEST     LUNGS: No focal consolidation. Scattered granulomas. Numerous scattered 1 mm pulmonary nodules bilaterally in a peribronchovascular distribution (for example in the right middle lobe series 3, image 121). No tracheal or endobronchial lesion. PLEURA: No pleural effusion. No pneumothorax. HEART/GREAT VESSELS: Normal sized heart. Coronary artery and mitral annular calcifications. Aortic atherosclerosis. No thoracic aortic aneurysm. MEDIASTINUM AND UMAIR: Large hiatal hernia. CHEST WALL AND LOWER NECK: Focal soft tissue density in the lateral right breast, patient had mammogram 10/3/2023 and this is unchanged compared to PET/CT 4/18/2023, not FDG avid. ABDOMEN     LIVER/BILIARY TREE:  Unremarkable. GALLBLADDER:  No calcified gallstones. No pericholecystic inflammatory change. SPLEEN:  Unremarkable. PANCREAS:  Unremarkable. ADRENAL GLANDS:  Unremarkable. KIDNEYS/URETERS:  Unremarkable. No hydronephrosis. STOMACH AND BOWEL: No disproportionate dilation of the small or large bowel. Large hiatal hernia. Colonic diverticulosis. APPENDIX: A normal appendix was visualized. ABDOMINOPELVIC CAVITY:  No ascites. No pneumoperitoneum. No lymphadenopathy. VESSELS: Atherosclerotic changes are present. No evidence of aneurysm. PELVIS     REPRODUCTIVE ORGANS: Surgical changes of prior hysterectomy. No adnexal mass. URINARY BLADDER:  Unremarkable. ABDOMINAL WALL/INGUINAL REGIONS:  Unremarkable. OSSEOUS STRUCTURES:  No acute fracture or destructive osseous lesion. Degenerative changes. IMPRESSION:     Numerous scattered 1 mm pulmonary nodules in a peribronchovascular distribution, likely infectious/inflammatory related to small airways disease. Based on current Fleischner Society 2017 Guidelines on incidental pulmonary nodule, patients with a known   malignancy are at increased risk of metastasis and should receive initial three month follow-up chest CT. Otherwise no CT evidence of metastatic disease in the chest, abdomen, or pelvis. Today, the patient is doing well. She denies significant abdominal pain, pelvic pain, nausea, vomiting, constipation, diarrhea, fevers, chills, or vaginal bleeding. Review of Systems   Constitutional: Negative. HENT: Negative. Eyes: Negative. Respiratory: Negative. Cardiovascular: Negative. Gastrointestinal: Negative. Endocrine: Negative. Genitourinary: Negative. Musculoskeletal: Negative. Skin: Negative. Neurological: Negative. Hematological: Negative. Psychiatric/Behavioral: Negative. Objective:    Blood pressure 110/72, pulse 80, height 4' 11" (1.499 m), weight 61.1 kg (134 lb 9.6 oz), SpO2 98 %, not currently breastfeeding. Body mass index is 27.19 kg/m². Body surface area is 1.56 meters squared. Physical Exam  Constitutional:       Appearance: She is well-developed. HENT:      Head: Normocephalic and atraumatic. Neck:      Thyroid: No thyromegaly. Cardiovascular:      Rate and Rhythm: Normal rate and regular rhythm. Heart sounds: Normal heart sounds. Pulmonary:      Effort: Pulmonary effort is normal.      Breath sounds: Normal breath sounds. Abdominal:      General: Bowel sounds are normal.      Palpations: Abdomen is soft. Comments: Well healed laparoscopic incisions. Genitourinary:     Comments: -Normal external female genitalia, normal Bartholin's and [place]'s glands                  -Normal midline urethral meatus.  No lesions notes                  -Bladder without fullness mass or tenderness                  -Vagina without lesion or discharge No significant cystocele or rectocele noted                  -Cervix surgically absent                  -Uterus surgically absent                  -Adnexae surgically absent                  - Anus without fissure of lesion    Musculoskeletal:         General: Normal range of motion. Cervical back: Normal range of motion and neck supple. Lymphadenopathy:      Cervical: No cervical adenopathy. Skin:     General: Skin is warm and dry. Neurological:      Mental Status: She is alert and oriented to person, place, and time.    Psychiatric:         Behavior: Behavior normal.             Discussed symptoms related to disease recurrence, Yes    Evaluated for late effects related to cancer treatment, Yes    Screening current for breast cancer, Yes, describe: Up-to-date with mammogram    Screening current for colon cancer, No patient to do Cologuard    Management of obesity addressed, No    Screening current for osteoporosis, Yes, describe: Recently diagnosed with osteoporosis based on DEXA scan refused bisphosphonates has increased calcium and vitamin D    Oncology Treatment Summary reviewed with patient and copy provided, Yes    Referral placed for psychosocial evaluation/screening to oncology social work Yes

## 2023-12-12 NOTE — LETTER
December 12, 2023     Ric Pineda  [address]    Patient: Ricky Bowers   YOB: 1955   Date of Visit: 12/12/2023       Dear Dr. Milton Barahona: Thank you for referring Justo Munson to me for evaluation. Below are my notes for this consultation. If you have questions, please do not hesitate to call me. I look forward to following your patient along with you. Sincerely,        Natasha Kumar MD        CC: Lela Vyas MD  12/12/2023 10:33 AM  Incomplete  Assessment/Plan:    Problem List Items Addressed This Visit       Uterine carcinosarcoma [facility])     Patient is very pleasant 59-year-old female with a history of stage Ia carcinosarcoma of the endometrium. She is undergone surgery chemo and radiation and now remains in a clinical remission. She has small 1 mm nodules in the chest.  Will follow-up with CT scan in another 3 months. With regard to survivorship. The only need identified is colorectal screening. Patient has not had a colonoscopy. She is not interested in this time but will move ahead with Cologuard. Other Visit Diagnoses       Endometrial sarcoma ([address])    -  Primary    Relevant Orders    CT chest abdomen pelvis w contrast    BUN    Creatinine, serum    Ambulatory Referral to Oncology Social Worker              REASON FOR VISIT: Endometrial  Cancer  Survivorship/Surveillance Visit    Problem:  Cancer Staging   Uterine carcinosarcoma [facility])  Staging form: Corpus Uteri - Carcinoma, AJCC 8th Edition  - Clinical stage from 8/26/2022: FIGO Stage IA (cT1a, cN0, cM0) - Signed by Natasha Kumar MD on 8/26/2022        Previous therapy:  Oncology History   Uterine carcinosarcoma ([address])   7/20/2022 Biopsy    Endometrium, EMB:  - Markedly atypical glandular and stromal elements, suspicious for carcinosarcoma. See comment.      Comment: There are superficial strips of atypical glandular elements which are diffusely positive for p53 by immunohistochemistry. Additionally, the the stromal elements demonstrate significant nuclear pleomorphism. The overall findings are suspicious for carcinosarcoma. 8/18/2022 Initial Diagnosis    Uterine carcinosarcoma ([address])     8/26/2022 -  Cancer Staged    Staging form: Corpus Uteri - Carcinoma, AJCC 8th Edition  - Clinical stage from 8/26/2022: FIGO Stage IA (cT1a, cN0, cM0) - Signed by Edward Mahoney MD on 8/26/2022  Histopathologic type: Carcinosarcoma  Stage prefix: Initial diagnosis  Histologic grade (G): G3  Histologic grading system: 3 grade system       8/2022 Surgery    Patient and did went robotic hysterectomy bilateral salpingo-oophorectomy sentinel lymph node mapping and biopsy for stage I A non invasive carcinosarcoma of the endometrium. The uterus did fracture within the vagina but no leakage into the abdomen was noted.      9/19/2022 -  Chemotherapy    palonosetron (ALOXI), 0.25 mg, Intravenous, Once, 6 of 6 cycles  Administration: 0.25 mg (9/19/2022), 0.25 mg (10/10/2022), 0.25 mg (10/31/2022), 0.25 mg (11/21/2022), 0.25 mg (12/12/2022), 0.25 mg (1/3/2023)  pegfilgrastim (NEULASTA), 6 mg, Subcutaneous, Once, 5 of 5 cycles  Administration: 6 mg (10/11/2022), 6 mg (11/1/2022), 6 mg (11/22/2022), 6 mg (12/13/2022), 6 mg (1/4/2023)  fosaprepitant (EMEND) IVPB, 150 mg, Intravenous, Once, 6 of 6 cycles  Administration: 150 mg (9/19/2022), 150 mg (10/10/2022), 150 mg (10/31/2022), 150 mg (11/21/2022), 150 mg (12/12/2022), 150 mg (1/3/2023)  CARBOplatin (PARAPLATIN) IVPB (GOG AUC DOSING), 572.4 mg, Intravenous, Once, 6 of 6 cycles  Administration: 572.4 mg (9/19/2022), 579.6 mg (10/10/2022), 579.6 mg (10/31/2022), 575.4 mg (11/21/2022), 564 mg (12/12/2022), 564 mg (1/3/2023)  PACLItaxel (TAXOL) chemo IVPB, 175 mg/m2 = 274.8 mg, Intravenous, Once, 6 of 6 cycles  Administration: 274.8 mg (9/19/2022), 278.4 mg (10/10/2022), 278.4 mg (10/31/2022), 276.6 mg (11/21/2022), 273 mg (12/12/2022), 273 mg (1/3/2023)     2/1/2023 - 2/10/2023 Radiation    Treatments:  Course: C1 HDR 2.5cmCyl  Plan ID Energy Source Fractions Dose per Fraction (cGy) Rx Dose Total Dose Delivered (cGy) Elapsed Days   HDR2.5cm cylinder diameter  Ir-192 3 / 3 700 5mm from cylinder surface 2,100 9    Treatment Dates:  2/1/2023, 2/6/2023, 2/10/2023. Patient ID: Keaton Mark is a 77 y.o. female  Patient is a very pleasant 41-year-old female with a history of stage Ia carcinosarcoma of the uterus status post robot-assisted total laparoscopic hysterectomy bilateral salpingo-oophorectomy sentinel lymph node mapping and biopsy 6 cycles of carboplatin and paclitaxel chemotherapy and vaginal brachytherapy completed in February 2023. Patient presents for follow-up and survivorship visit. She is presently doing well. She has recently undergone a CAT scan which reveals the following:  FINDINGS:     CHEST     LUNGS: No focal consolidation. Scattered granulomas. Numerous scattered 1 mm pulmonary nodules bilaterally in a peribronchovascular distribution (for example in the right middle lobe series 3, image 121). No tracheal or endobronchial lesion. PLEURA: No pleural effusion. No pneumothorax. HEART/GREAT VESSELS: Normal sized heart. Coronary artery and mitral annular calcifications. Aortic atherosclerosis. No thoracic aortic aneurysm. MEDIASTINUM AND UMAIR: Large hiatal hernia. CHEST WALL AND LOWER NECK: Focal soft tissue density in the lateral right breast, patient had mammogram 10/3/2023 and this is unchanged compared to PET/CT 4/18/2023, not FDG avid. ABDOMEN     LIVER/BILIARY TREE:  Unremarkable. GALLBLADDER:  No calcified gallstones. No pericholecystic inflammatory change. SPLEEN:  Unremarkable. PANCREAS:  Unremarkable. ADRENAL GLANDS:  Unremarkable. KIDNEYS/URETERS:  Unremarkable. No hydronephrosis.      STOMACH AND BOWEL: No disproportionate dilation of the small or large bowel. Large hiatal hernia. Colonic diverticulosis. APPENDIX: A normal appendix was visualized. ABDOMINOPELVIC CAVITY:  No ascites. No pneumoperitoneum. No lymphadenopathy. VESSELS: Atherosclerotic changes are present. No evidence of aneurysm. PELVIS     REPRODUCTIVE ORGANS: Surgical changes of prior hysterectomy. No adnexal mass. URINARY BLADDER:  Unremarkable. ABDOMINAL WALL/INGUINAL REGIONS:  Unremarkable. OSSEOUS STRUCTURES:  No acute fracture or destructive osseous lesion. Degenerative changes. IMPRESSION:     Numerous scattered 1 mm pulmonary nodules in a peribronchovascular distribution, likely infectious/inflammatory related to small airways disease. Based on current Fleischner Society 2017 Guidelines on incidental pulmonary nodule, patients with a known   malignancy are at increased risk of metastasis and should receive initial three month follow-up chest CT. Otherwise no CT evidence of metastatic disease in the chest, abdomen, or pelvis. Today, the patient is doing well. She denies significant abdominal pain, pelvic pain, nausea, vomiting, constipation, diarrhea, fevers, chills, or vaginal bleeding. Review of Systems   Constitutional: Negative. HENT: Negative. Eyes: Negative. Respiratory: Negative. Cardiovascular: Negative. Gastrointestinal: Negative. Endocrine: Negative. Genitourinary: Negative. Musculoskeletal: Negative. Skin: Negative. Neurological: Negative. Hematological: Negative. Psychiatric/Behavioral: Negative. Objective:    Blood pressure 110/72, pulse 80, height 4' 11" (1.499 m), weight 61.1 kg (134 lb 9.6 oz), SpO2 98 %, not currently breastfeeding. Body mass index is 27.19 kg/m². Body surface area is 1.56 meters squared. Physical Exam  Constitutional:       Appearance: She is well-developed. HENT:      Head: Normocephalic and atraumatic.    Neck:      Thyroid: No thyromegaly. Cardiovascular:      Rate and Rhythm: Normal rate and regular rhythm. Heart sounds: Normal heart sounds. Pulmonary:      Effort: Pulmonary effort is normal.      Breath sounds: Normal breath sounds. Abdominal:      General: Bowel sounds are normal.      Palpations: Abdomen is soft. Comments: Well healed laparoscopic incisions. Genitourinary:     Comments: -Normal external female genitalia, normal Bartholin's and [place]'s glands                  -Normal midline urethral meatus. No lesions notes                  -Bladder without fullness mass or tenderness                  -Vagina without lesion or discharge No significant cystocele or rectocele noted                  -Cervix surgically absent                  -Uterus surgically absent                  -Adnexae surgically absent                  - Anus without fissure of lesion    Musculoskeletal:         General: Normal range of motion. Cervical back: Normal range of motion and neck supple. Lymphadenopathy:      Cervical: No cervical adenopathy. Skin:     General: Skin is warm and dry. Neurological:      Mental Status: She is alert and oriented to person, place, and time.    Psychiatric:         Behavior: Behavior normal.             Discussed symptoms related to disease recurrence, Yes    Evaluated for late effects related to cancer treatment, Yes    Screening current for breast cancer, Yes, describe: Up-to-date with mammogram    Screening current for colon cancer, No patient to do Cologuard    Management of obesity addressed, No    Screening current for osteoporosis, Yes, describe: Recently diagnosed with osteoporosis based on DEXA scan refused bisphosphonates has increased calcium and vitamin D    Oncology Treatment Summary reviewed with patient and copy provided, Yes    Referral placed for psychosocial evaluation/screening to oncology social work Yes             Hossein Moran MD  12/12/2023  9:12 AM  Sign when Signing Visit  Assessment/Plan:    Problem List Items Addressed This Visit    None        REASON FOR VISIT:   Survivorship/Surveillance Visit    Problem:  Cancer Staging   Uterine carcinosarcoma [facility])  Staging form: Corpus Uteri - Carcinoma, AJCC 8th Edition  - Clinical stage from 8/26/2022: FIGO Stage IA (cT1a, cN0, cM0) - Signed by Judit Castorena MD on 8/26/2022        Previous therapy:  Oncology History   Uterine carcinosarcoma ([address])   7/20/2022 Biopsy    Endometrium, EMB:  - Markedly atypical glandular and stromal elements, suspicious for carcinosarcoma. See comment. Comment: There are superficial strips of atypical glandular elements which are diffusely positive for p53 by immunohistochemistry. Additionally, the the stromal elements demonstrate significant nuclear pleomorphism. The overall findings are suspicious for carcinosarcoma. 8/18/2022 Initial Diagnosis    Uterine carcinosarcoma ([address])     8/26/2022 -  Cancer Staged    Staging form: Corpus Uteri - Carcinoma, AJCC 8th Edition  - Clinical stage from 8/26/2022: FIGO Stage IA (cT1a, cN0, cM0) - Signed by Judit Castorena MD on 8/26/2022  Histopathologic type: Carcinosarcoma  Stage prefix: Initial diagnosis  Histologic grade (G): G3  Histologic grading system: 3 grade system       8/2022 Surgery    Patient and did went robotic hysterectomy bilateral salpingo-oophorectomy sentinel lymph node mapping and biopsy for stage I A non invasive carcinosarcoma of the endometrium. The uterus did fracture within the vagina but no leakage into the abdomen was noted.      9/19/2022 -  Chemotherapy    palonosetron (ALOXI), 0.25 mg, Intravenous, Once, 6 of 6 cycles  Administration: 0.25 mg (9/19/2022), 0.25 mg (10/10/2022), 0.25 mg (10/31/2022), 0.25 mg (11/21/2022), 0.25 mg (12/12/2022), 0.25 mg (1/3/2023)  pegfilgrastim (NEULASTA), 6 mg, Subcutaneous, Once, 5 of 5 cycles  Administration: 6 mg (10/11/2022), 6 mg (11/1/2022), 6 mg (11/22/2022), 6 mg (12/13/2022), 6 mg (1/4/2023)  fosaprepitant (EMEND) IVPB, 150 mg, Intravenous, Once, 6 of 6 cycles  Administration: 150 mg (9/19/2022), 150 mg (10/10/2022), 150 mg (10/31/2022), 150 mg (11/21/2022), 150 mg (12/12/2022), 150 mg (1/3/2023)  CARBOplatin (PARAPLATIN) IVPB (GOG AUC DOSING), 572.4 mg, Intravenous, Once, 6 of 6 cycles  Administration: 572.4 mg (9/19/2022), 579.6 mg (10/10/2022), 579.6 mg (10/31/2022), 575.4 mg (11/21/2022), 564 mg (12/12/2022), 564 mg (1/3/2023)  PACLItaxel (TAXOL) chemo IVPB, 175 mg/m2 = 274.8 mg, Intravenous, Once, 6 of 6 cycles  Administration: 274.8 mg (9/19/2022), 278.4 mg (10/10/2022), 278.4 mg (10/31/2022), 276.6 mg (11/21/2022), 273 mg (12/12/2022), 273 mg (1/3/2023)     2/1/2023 - 2/10/2023 Radiation    Treatments:  Course: C1 HDR 2.5cmCyl  Plan ID Energy Source Fractions Dose per Fraction (cGy) Rx Dose Total Dose Delivered (cGy) Elapsed Days   HDR2.5cm cylinder diameter  Ir-192 3 / 3 700 5mm from cylinder surface 2,100 9    Treatment Dates:  2/1/2023, 2/6/2023, 2/10/2023. Patient ID: Devin Baker is a 77 y.o. female  Patient is a very pleasant 49-year-old female with a history of stage Ia carcinosarcoma of the uterus status post robot-assisted total laparoscopic hysterectomy bilateral salpingo-oophorectomy sentinel lymph node mapping and biopsy 6 cycles of carboplatin and paclitaxel chemotherapy and vaginal brachytherapy completed in February 2023. Patient presents for follow-up and survivorship visit. She is presently doing well. She has recently undergone a CAT scan which reveals the following:  FINDINGS:     CHEST     LUNGS: No focal consolidation. Scattered granulomas. Numerous scattered 1 mm pulmonary nodules bilaterally in a peribronchovascular distribution (for example in the right middle lobe series 3, image 121). No tracheal or endobronchial lesion. PLEURA: No pleural effusion. No pneumothorax. HEART/GREAT VESSELS: Normal sized heart.  Coronary artery and mitral annular calcifications. Aortic atherosclerosis. No thoracic aortic aneurysm. MEDIASTINUM AND UMAIR: Large hiatal hernia. CHEST WALL AND LOWER NECK: Focal soft tissue density in the lateral right breast, patient had mammogram 10/3/2023 and this is unchanged compared to PET/CT 4/18/2023, not FDG avid. ABDOMEN     LIVER/BILIARY TREE:  Unremarkable. GALLBLADDER:  No calcified gallstones. No pericholecystic inflammatory change. SPLEEN:  Unremarkable. PANCREAS:  Unremarkable. ADRENAL GLANDS:  Unremarkable. KIDNEYS/URETERS:  Unremarkable. No hydronephrosis. STOMACH AND BOWEL: No disproportionate dilation of the small or large bowel. Large hiatal hernia. Colonic diverticulosis. APPENDIX: A normal appendix was visualized. ABDOMINOPELVIC CAVITY:  No ascites. No pneumoperitoneum. No lymphadenopathy. VESSELS: Atherosclerotic changes are present. No evidence of aneurysm. PELVIS     REPRODUCTIVE ORGANS: Surgical changes of prior hysterectomy. No adnexal mass. URINARY BLADDER:  Unremarkable. ABDOMINAL WALL/INGUINAL REGIONS:  Unremarkable. OSSEOUS STRUCTURES:  No acute fracture or destructive osseous lesion. Degenerative changes. IMPRESSION:     Numerous scattered 1 mm pulmonary nodules in a peribronchovascular distribution, likely infectious/inflammatory related to small airways disease. Based on current Fleischner Society 2017 Guidelines on incidental pulmonary nodule, patients with a known   malignancy are at increased risk of metastasis and should receive initial three month follow-up chest CT. Otherwise no CT evidence of metastatic disease in the chest, abdomen, or pelvis. Today, the patient is doing well. She denies significant abdominal pain, pelvic pain, nausea, vomiting, constipation, diarrhea, fevers, chills, or vaginal bleeding. Review of Systems   Constitutional: Negative. HENT: Negative. Eyes: Negative.     Respiratory: Negative. Cardiovascular: Negative. Gastrointestinal: Negative. Endocrine: Negative. Genitourinary: Negative. Musculoskeletal: Negative. Skin: Negative. Neurological: Negative. Hematological: Negative. Psychiatric/Behavioral: Negative. Objective:    not currently breastfeeding. There is no height or weight on file to calculate BMI. There is no height or weight on file to calculate BSA. Physical Exam  Constitutional:       Appearance: She is well-developed. HENT:      Head: Normocephalic and atraumatic. Neck:      Thyroid: No thyromegaly. Cardiovascular:      Rate and Rhythm: Normal rate and regular rhythm. Heart sounds: Normal heart sounds. Pulmonary:      Effort: Pulmonary effort is normal.      Breath sounds: Normal breath sounds. Abdominal:      General: Bowel sounds are normal.      Palpations: Abdomen is soft. Comments: Well healed laparoscopic incisions. Genitourinary:     Comments: -Normal external female genitalia, normal Bartholin's and [place]'s glands                  -Normal midline urethral meatus. No lesions notes                  -Bladder without fullness mass or tenderness                  -Vagina without lesion or discharge No significant cystocele or rectocele noted                  -Cervix surgically absent                  -Uterus surgically absent                  -Adnexae surgically absent                  - Anus without fissure of lesion    Musculoskeletal:         General: Normal range of motion. Cervical back: Normal range of motion and neck supple. Lymphadenopathy:      Cervical: No cervical adenopathy. Skin:     General: Skin is warm and dry. Neurological:      Mental Status: She is alert and oriented to person, place, and time.    Psychiatric:         Behavior: Behavior normal.             Discussed symptoms related to disease recurrence, Yes    Evaluated for late effects related to cancer treatment, Yes    Screening current for breast cancer, Yes, describe: Up-to-date with mammogram    Screening current for colon cancer, {YES-DESCRIBE/[id number]}    Management of obesity addressed, No    Screening current for osteoporosis, Yes, describe: Recently diagnosed with osteoporosis based on DEXA scan    Oncology Treatment Summary reviewed with patient and copy provided, Yes    Referral placed for psychosocial evaluation/screening to oncology social work Yes

## 2023-12-12 NOTE — LETTER
December 12, 2023     Ric Sanchez  [address] [place] [address]    Patient: Jaimie Torres   YOB: 1955   Date of Visit: 12/12/2023       Dear Dr. Connie Alvares: Thank you for referring Arjeff Dunhamjob to me for evaluation. Below are my notes for this consultation. If you have questions, please do not hesitate to call me. I look forward to following your patient along with you. Sincerely,        Bennetta Simmonds, MD        CC: Braden Parnell MD  12/12/2023 10:34 AM  Sign when Signing Visit  Assessment/Plan:    Problem List Items Addressed This Visit       Uterine carcinosarcoma [facility])     Patient is very pleasant 12-year-old female with a history of stage Ia carcinosarcoma of the endometrium. She is undergone surgery chemo and radiation and now remains in a clinical remission. She has small 1 mm nodules in the chest.  Will follow-up with CT scan in another 3 months. With regard to survivorship. The only need identified is colorectal screening. Patient has not had a colonoscopy. She is not interested in this time but will move ahead with Cologuard. Other Visit Diagnoses       Endometrial sarcoma ([address])    -  Primary    Relevant Orders    CT chest abdomen pelvis w contrast    BUN    Creatinine, serum    Ambulatory Referral to Oncology Social Worker              REASON FOR VISIT: Endometrial  Cancer  Survivorship/Surveillance Visit    Problem:  Cancer Staging   Uterine carcinosarcoma [facility])  Staging form: Corpus Uteri - Carcinoma, AJCC 8th Edition  - Clinical stage from 8/26/2022: FIGO Stage IA (cT1a, cN0, cM0) - Signed by Bennetta Simmonds, MD on 8/26/2022        Previous therapy:  Oncology History   Uterine carcinosarcoma ([address])   7/20/2022 Biopsy    Endometrium, EMB:  - Markedly atypical glandular and stromal elements, suspicious for carcinosarcoma. See comment.      Comment: There are superficial strips of atypical glandular elements which are diffusely positive for p53 by immunohistochemistry. Additionally, the the stromal elements demonstrate significant nuclear pleomorphism. The overall findings are suspicious for carcinosarcoma. 8/18/2022 Initial Diagnosis    Uterine carcinosarcoma ([address])     8/26/2022 -  Cancer Staged    Staging form: Corpus Uteri - Carcinoma, AJCC 8th Edition  - Clinical stage from 8/26/2022: FIGO Stage IA (cT1a, cN0, cM0) - Signed by Silvino Carrington MD on 8/26/2022  Histopathologic type: Carcinosarcoma  Stage prefix: Initial diagnosis  Histologic grade (G): G3  Histologic grading system: 3 grade system       8/2022 Surgery    Patient and did went robotic hysterectomy bilateral salpingo-oophorectomy sentinel lymph node mapping and biopsy for stage I A non invasive carcinosarcoma of the endometrium. The uterus did fracture within the vagina but no leakage into the abdomen was noted.      9/19/2022 -  Chemotherapy    palonosetron (ALOXI), 0.25 mg, Intravenous, Once, 6 of 6 cycles  Administration: 0.25 mg (9/19/2022), 0.25 mg (10/10/2022), 0.25 mg (10/31/2022), 0.25 mg (11/21/2022), 0.25 mg (12/12/2022), 0.25 mg (1/3/2023)  pegfilgrastim (NEULASTA), 6 mg, Subcutaneous, Once, 5 of 5 cycles  Administration: 6 mg (10/11/2022), 6 mg (11/1/2022), 6 mg (11/22/2022), 6 mg (12/13/2022), 6 mg (1/4/2023)  fosaprepitant (EMEND) IVPB, 150 mg, Intravenous, Once, 6 of 6 cycles  Administration: 150 mg (9/19/2022), 150 mg (10/10/2022), 150 mg (10/31/2022), 150 mg (11/21/2022), 150 mg (12/12/2022), 150 mg (1/3/2023)  CARBOplatin (PARAPLATIN) IVPB (GOG AUC DOSING), 572.4 mg, Intravenous, Once, 6 of 6 cycles  Administration: 572.4 mg (9/19/2022), 579.6 mg (10/10/2022), 579.6 mg (10/31/2022), 575.4 mg (11/21/2022), 564 mg (12/12/2022), 564 mg (1/3/2023)  PACLItaxel (TAXOL) chemo IVPB, 175 mg/m2 = 274.8 mg, Intravenous, Once, 6 of 6 cycles  Administration: 274.8 mg (9/19/2022), 278.4 mg (10/10/2022), 278.4 mg (10/31/2022), 276.6 mg (11/21/2022), 273 mg (12/12/2022), 273 mg (1/3/2023)     2/1/2023 - 2/10/2023 Radiation    Treatments:  Course: C1 HDR 2.5cmCyl  Plan ID Energy Source Fractions Dose per Fraction (cGy) Rx Dose Total Dose Delivered (cGy) Elapsed Days   HDR2.5cm cylinder diameter  Ir-192 3 / 3 700 5mm from cylinder surface 2,100 9    Treatment Dates:  2/1/2023, 2/6/2023, 2/10/2023. Patient ID: Henry Navarro is a 77 y.o. female  Patient is a very pleasant 75-year-old female with a history of stage Ia carcinosarcoma of the uterus status post robot-assisted total laparoscopic hysterectomy bilateral salpingo-oophorectomy sentinel lymph node mapping and biopsy 6 cycles of carboplatin and paclitaxel chemotherapy and vaginal brachytherapy completed in February 2023. Patient presents for follow-up and survivorship visit. She is presently doing well. She has recently undergone a CAT scan which reveals the following:  FINDINGS:     CHEST     LUNGS: No focal consolidation. Scattered granulomas. Numerous scattered 1 mm pulmonary nodules bilaterally in a peribronchovascular distribution (for example in the right middle lobe series 3, image 121). No tracheal or endobronchial lesion. PLEURA: No pleural effusion. No pneumothorax. HEART/GREAT VESSELS: Normal sized heart. Coronary artery and mitral annular calcifications. Aortic atherosclerosis. No thoracic aortic aneurysm. MEDIASTINUM AND UMAIR: Large hiatal hernia. CHEST WALL AND LOWER NECK: Focal soft tissue density in the lateral right breast, patient had mammogram 10/3/2023 and this is unchanged compared to PET/CT 4/18/2023, not FDG avid. ABDOMEN     LIVER/BILIARY TREE:  Unremarkable. GALLBLADDER:  No calcified gallstones. No pericholecystic inflammatory change. SPLEEN:  Unremarkable. PANCREAS:  Unremarkable. ADRENAL GLANDS:  Unremarkable. KIDNEYS/URETERS:  Unremarkable. No hydronephrosis.      STOMACH AND BOWEL: No disproportionate dilation of the small or large bowel. Large hiatal hernia. Colonic diverticulosis. APPENDIX: A normal appendix was visualized. ABDOMINOPELVIC CAVITY:  No ascites. No pneumoperitoneum. No lymphadenopathy. VESSELS: Atherosclerotic changes are present. No evidence of aneurysm. PELVIS     REPRODUCTIVE ORGANS: Surgical changes of prior hysterectomy. No adnexal mass. URINARY BLADDER:  Unremarkable. ABDOMINAL WALL/INGUINAL REGIONS:  Unremarkable. OSSEOUS STRUCTURES:  No acute fracture or destructive osseous lesion. Degenerative changes. IMPRESSION:     Numerous scattered 1 mm pulmonary nodules in a peribronchovascular distribution, likely infectious/inflammatory related to small airways disease. Based on current Fleischner Society 2017 Guidelines on incidental pulmonary nodule, patients with a known   malignancy are at increased risk of metastasis and should receive initial three month follow-up chest CT. Otherwise no CT evidence of metastatic disease in the chest, abdomen, or pelvis. Today, the patient is doing well. She denies significant abdominal pain, pelvic pain, nausea, vomiting, constipation, diarrhea, fevers, chills, or vaginal bleeding. Review of Systems   Constitutional: Negative. HENT: Negative. Eyes: Negative. Respiratory: Negative. Cardiovascular: Negative. Gastrointestinal: Negative. Endocrine: Negative. Genitourinary: Negative. Musculoskeletal: Negative. Skin: Negative. Neurological: Negative. Hematological: Negative. Psychiatric/Behavioral: Negative. Objective:    Blood pressure 110/72, pulse 80, height 4' 11" (1.499 m), weight 61.1 kg (134 lb 9.6 oz), SpO2 98 %, not currently breastfeeding. Body mass index is 27.19 kg/m². Body surface area is 1.56 meters squared. Physical Exam  Constitutional:       Appearance: She is well-developed. HENT:      Head: Normocephalic and atraumatic.    Neck: Thyroid: No thyromegaly. Cardiovascular:      Rate and Rhythm: Normal rate and regular rhythm. Heart sounds: Normal heart sounds. Pulmonary:      Effort: Pulmonary effort is normal.      Breath sounds: Normal breath sounds. Abdominal:      General: Bowel sounds are normal.      Palpations: Abdomen is soft. Comments: Well healed laparoscopic incisions. Genitourinary:     Comments: -Normal external female genitalia, normal Bartholin's and [place]'s glands                  -Normal midline urethral meatus. No lesions notes                  -Bladder without fullness mass or tenderness                  -Vagina without lesion or discharge No significant cystocele or rectocele noted                  -Cervix surgically absent                  -Uterus surgically absent                  -Adnexae surgically absent                  - Anus without fissure of lesion    Musculoskeletal:         General: Normal range of motion. Cervical back: Normal range of motion and neck supple. Lymphadenopathy:      Cervical: No cervical adenopathy. Skin:     General: Skin is warm and dry. Neurological:      Mental Status: She is alert and oriented to person, place, and time.    Psychiatric:         Behavior: Behavior normal.             Discussed symptoms related to disease recurrence, Yes    Evaluated for late effects related to cancer treatment, Yes    Screening current for breast cancer, Yes, describe: Up-to-date with mammogram    Screening current for colon cancer, No patient to do Cologuard    Management of obesity addressed, No    Screening current for osteoporosis, Yes, describe: Recently diagnosed with osteoporosis based on DEXA scan refused bisphosphonates has increased calcium and vitamin D    Oncology Treatment Summary reviewed with patient and copy provided, Yes    Referral placed for psychosocial evaluation/screening to oncology social work Yes

## 2023-12-21 ENCOUNTER — PATIENT OUTREACH (OUTPATIENT)
Dept: CASE MANAGEMENT | Facility: HOSPITAL | Age: 68
End: 2023-12-21

## 2023-12-26 ENCOUNTER — PATIENT OUTREACH (OUTPATIENT)
Dept: CASE MANAGEMENT | Facility: HOSPITAL | Age: 68
End: 2023-12-26

## 2024-01-05 ENCOUNTER — PATIENT OUTREACH (OUTPATIENT)
Dept: CASE MANAGEMENT | Facility: HOSPITAL | Age: 69
End: 2024-01-05

## 2024-01-16 ENCOUNTER — TELEPHONE (OUTPATIENT)
Dept: GYNECOLOGIC ONCOLOGY | Facility: CLINIC | Age: 69
End: 2024-01-16

## 2024-01-16 NOTE — TELEPHONE ENCOUNTER
Called patient but voicemail was not set up.  Called Patient's son and rescheduled 3/26 appointment to 4/3 at 9:30 am with Sulema Massey in [place].

## 2024-03-07 ENCOUNTER — APPOINTMENT (OUTPATIENT)
Dept: LAB | Facility: HOSPITAL | Age: 69
End: 2024-03-07
Attending: OBSTETRICS & GYNECOLOGY
Payer: MEDICARE

## 2024-03-07 DIAGNOSIS — C54.1 ENDOMETRIAL SARCOMA (HCC): ICD-10-CM

## 2024-03-07 LAB
BUN SERPL-MCNC: 16 MG/DL (ref 5–25)
CREAT SERPL-MCNC: 0.66 MG/DL (ref 0.6–1.3)
GFR SERPL CREATININE-BSD FRML MDRD: 89 ML/MIN/1.73SQ M

## 2024-03-07 PROCEDURE — 84520 ASSAY OF UREA NITROGEN: CPT

## 2024-03-07 PROCEDURE — 36415 COLL VENOUS BLD VENIPUNCTURE: CPT

## 2024-03-07 PROCEDURE — 82565 ASSAY OF CREATININE: CPT

## 2024-03-12 ENCOUNTER — HOSPITAL ENCOUNTER (OUTPATIENT)
Dept: CT IMAGING | Facility: HOSPITAL | Age: 69
Discharge: HOME | End: 2024-03-12
Attending: OBSTETRICS & GYNECOLOGY
Payer: MEDICARE

## 2024-03-12 DIAGNOSIS — C54.1 ENDOMETRIAL SARCOMA (HCC): ICD-10-CM

## 2024-03-12 PROCEDURE — 71260 CT THORAX DX C+: CPT

## 2024-03-12 PROCEDURE — G1004 CDSM NDSC: HCPCS

## 2024-03-12 PROCEDURE — 74177 CT ABD & PELVIS W/CONTRAST: CPT

## 2024-03-12 RX ADMIN — IOHEXOL 100 ML: 350 INJECTION, SOLUTION INTRAVENOUS at 07:03

## 2024-03-28 DIAGNOSIS — E78.2 HYPERLIPIDEMIA, MIXED: ICD-10-CM

## 2024-03-28 RX ORDER — ROSUVASTATIN CALCIUM 5 MG/1
5 TABLET, COATED ORAL DAILY
Qty: 90 TABLET | Refills: 3 | Status: SHIPPED | OUTPATIENT
Start: 2024-03-28

## 2024-04-03 ENCOUNTER — OFFICE VISIT (OUTPATIENT)
Dept: GYNECOLOGIC ONCOLOGY | Facility: CLINIC | Age: 69
End: 2024-04-03
Payer: MEDICARE

## 2024-04-03 VITALS
OXYGEN SATURATION: 97 % | HEART RATE: 61 BPM | HEIGHT: 60 IN | SYSTOLIC BLOOD PRESSURE: 104 MMHG | DIASTOLIC BLOOD PRESSURE: 70 MMHG | BODY MASS INDEX: 26.7 KG/M2 | WEIGHT: 136 LBS

## 2024-04-03 DIAGNOSIS — C54.1 MALIGNANT NEOPLASM OF ENDOMETRIUM (HCC): ICD-10-CM

## 2024-04-03 DIAGNOSIS — C55 UTERINE CARCINOSARCOMA (HCC): Primary | ICD-10-CM

## 2024-04-03 PROCEDURE — 99214 OFFICE O/P EST MOD 30 MIN: CPT | Performed by: NURSE PRACTITIONER

## 2024-04-03 NOTE — PROGRESS NOTES
"Assessment/Plan:    Problem List Items Addressed This Visit          Genitourinary    Malignant neoplasm of endometrium (HCC)    Relevant Orders        Uterine carcinosarcoma (HCC) - Primary     69-year-old with stage IA uterine carcinosarcoma diagnosed in July 2022. She completed adjuvant chemotherapy in January 2023, and completed radiation in February 2023. She is feeling well, and has no concerns. She had a CT completed prior to this visit which shows no metastatic disease. \"Micronodules\" in the lung are unchanged. Her pelvic exam is normal. Her PS is 0.    Patient will return to the office in 3 months for continued surveillance, with a  pre-visit . She is aware of warning signs of cancer recurrence and when to call the office.           Relevant Orders               CHIEF COMPLAINT: Uterine carcinosarcoma surveillance      Subjective:     Problem:  Cancer Staging   Uterine carcinosarcoma (HCC)  Staging form: Corpus Uteri - Carcinoma, AJCC 8th Edition  - Clinical stage from 8/26/2022: FIGO Stage IA (cT1a, cN0, cM0) - Signed by Nehemiah Pillai MD on 8/26/2022      Previous therapy:  Oncology History   Uterine carcinosarcoma (HCC)   7/20/2022 Biopsy    Endometrium, EMB:  - Markedly atypical glandular and stromal elements, suspicious for carcinosarcoma.  See comment.     Comment: There are superficial strips of atypical glandular elements which are diffusely positive for p53 by immunohistochemistry.  Additionally, the the stromal elements demonstrate significant nuclear pleomorphism.  The overall findings are suspicious for carcinosarcoma.     8/18/2022 Initial Diagnosis    Uterine carcinosarcoma (HCC)     8/26/2022 -  Cancer Staged    Staging form: Corpus Uteri - Carcinoma, AJCC 8th Edition  - Clinical stage from 8/26/2022: FIGO Stage IA (cT1a, cN0, cM0) - Signed by Nehemiah Pillai MD on 8/26/2022  Histopathologic type: Carcinosarcoma  Stage prefix: Initial diagnosis  Histologic grade (G): " G3  Histologic grading system: 3 grade system       8/2022 Surgery    Patient and did went robotic hysterectomy bilateral salpingo-oophorectomy sentinel lymph node mapping and biopsy for stage I A non invasive carcinosarcoma of the endometrium.  The uterus did fracture within the vagina but no leakage into the abdomen was noted.     9/19/2022 - 1/4/2023 Chemotherapy    palonosetron (ALOXI), 0.25 mg, Intravenous, Once, 6 of 6 cycles  Administration: 0.25 mg (9/19/2022), 0.25 mg (10/10/2022), 0.25 mg (10/31/2022), 0.25 mg (11/21/2022), 0.25 mg (12/12/2022), 0.25 mg (1/3/2023)  pegfilgrastim (NEULASTA), 6 mg, Subcutaneous, Once, 5 of 5 cycles  Administration: 6 mg (10/11/2022), 6 mg (11/1/2022), 6 mg (11/22/2022), 6 mg (12/13/2022), 6 mg (1/4/2023)  fosaprepitant (EMEND) IVPB, 150 mg, Intravenous, Once, 6 of 6 cycles  Administration: 150 mg (9/19/2022), 150 mg (10/10/2022), 150 mg (10/31/2022), 150 mg (11/21/2022), 150 mg (12/12/2022), 150 mg (1/3/2023)  CARBOplatin (PARAPLATIN) IVPB (GOG AUC DOSING), 572.4 mg, Intravenous, Once, 6 of 6 cycles  Administration: 572.4 mg (9/19/2022), 579.6 mg (10/10/2022), 579.6 mg (10/31/2022), 575.4 mg (11/21/2022), 564 mg (12/12/2022), 564 mg (1/3/2023)  PACLItaxel (TAXOL) chemo IVPB, 175 mg/m2 = 274.8 mg, Intravenous, Once, 6 of 6 cycles  Administration: 274.8 mg (9/19/2022), 278.4 mg (10/10/2022), 278.4 mg (10/31/2022), 276.6 mg (11/21/2022), 273 mg (12/12/2022), 273 mg (1/3/2023)     2/1/2023 - 2/10/2023 Radiation    Treatments:  Course: C1 HDR 2.5cmCyl  Plan ID Energy Source Fractions Dose per Fraction (cGy) Rx Dose Total Dose Delivered (cGy) Elapsed Days   HDR2.5cm cylinder diameter  Ir-192 3 / 3 700 5mm from cylinder surface 2,100 9    Treatment Dates:  2/1/2023, 2/6/2023, 2/10/2023.            Patient ID: Heather José is a 69 y.o. female    This is a 69 y.o. female last evaluated in December 2023 who presents to the office for uterine carcinosarcoma surveillance. She has  been well in the interim and is without acute complaints. She denies nausea or vomiting. Her appetite is appropriate. She is voiding and moving her bowels without difficulty. She denies abdominal or pelvic pain. The patient is without vaginal bleeding or discharge. She is ambulatory.          Review of Systems   Constitutional:  Negative for chills, fatigue, fever and unexpected weight change.   HENT:  Negative for nosebleeds.    Eyes: Negative.    Respiratory:  Negative for cough, chest tightness, shortness of breath and wheezing.    Cardiovascular:  Negative for chest pain, palpitations and leg swelling.   Gastrointestinal:  Negative for abdominal distention, abdominal pain, anal bleeding, blood in stool, constipation, diarrhea, nausea, rectal pain and vomiting.   Endocrine: Negative.    Genitourinary:  Negative for difficulty urinating, dysuria, frequency, hematuria, pelvic pain, urgency, vaginal bleeding, vaginal discharge and vaginal pain.   Musculoskeletal:  Negative for arthralgias and joint swelling.   Skin:  Negative for color change, pallor and rash.   Neurological:  Negative for dizziness, weakness, light-headedness, numbness and headaches.   Hematological: Negative.    Psychiatric/Behavioral: Negative.         Current Outpatient Medications   Medication Sig Dispense Refill    cholecalciferol (VITAMIN D3) 1,000 units tablet Take 1,000 Units by mouth daily      cyanocobalamin (VITAMIN B-12) 100 MCG tablet Take 100 mcg by mouth daily      escitalopram (LEXAPRO) 10 mg tablet TAKE 1 TABLET (10 MG TOTAL) BY MOUTH DAILY 90 tablet 3    loratadine-pseudoephedrine (CLARITIN-D 24-HOUR)  mg per 24 hr tablet Take 1 tablet by mouth daily      LORazepam (ATIVAN) 1 mg tablet Take 1 tablet (1 mg total) by mouth every 6 (six) hours as needed for anxiety (or nausea) 36 tablet 1    Multiple Vitamins-Minerals (multivitamin with minerals) tablet Take 1 tablet by mouth daily      Multiple Vitamins-Minerals (OCUVITE PO)  Take by mouth      rosuvastatin (CRESTOR) 5 mg tablet TAKE 1 TABLET (5 MG TOTAL) BY MOUTH DAILY 90 tablet 3    naloxone (NARCAN) 4 mg/0.1 mL nasal spray Administer 1 spray into a nostril. If no response after 2-3 minutes, give another dose in the other nostril using a new spray. (Patient not taking: Reported on 2023) 1 each 1    ondansetron (ZOFRAN) 8 mg tablet TAKE 1 TABLET (8 MG TOTAL) BY MOUTH EVERY 8 (EIGHT) HOURS AS NEEDED FOR NAUSEA OR VOMITING (Patient not taking: Reported on 2023) 30 tablet 1     No current facility-administered medications for this visit.       Allergies   Allergen Reactions    Sulfa Antibiotics Facial Swelling     Redness itching    Acetazolamide Other (See Comments)     23 Per pts son and pt - unaware of allergy or reaction at this time       Past Medical History:   Diagnosis Date    Anemia     Anxiety     Hiatal hernia     Hypercholesterolemia     Uterine cancer (HCC)        Past Surgical History:   Procedure Laterality Date    COLONOSCOPY      complete     DILATION AND CURETTAGE OF UTERUS      x2     FL GUIDED CENTRAL VENOUS ACCESS DEVICE INSERTION  2022    HYSTERECTOMY      OK INSJ TUNNELED CTR VAD W/SUBQ PORT AGE 5 YR/> N/A 2022    Procedure: INSERTION VENOUS PORT ( PORT-A-CATH) IR;  Surgeon: Josafat Hastings DO;  Location: [facility];  Service: Interventional Radiology    OK LAPS TOTAL HYSTERECT 250 GM/< W/RMVL TUBE/OVARY N/A 2022    Procedure: HYSTERECTOMY LAPAROSCOPIC TOTAL (LTH) W/ ROBOTICS, BILATERAL SALPINGO-OOPHORECTOMY, SENTINEL LYMPH NODE MAPPING AND BIOPSY;  Surgeon: Nehemiah Pillai MD;  Location: [facility];  Service: Gynecology Oncology    OK RMVL ANN-MARIE CTR VAD W/SUBQ PORT/ CTR/PRPH INSJ N/A 2023    Procedure: REMOVAL VENOUS PORT (PORT-A-CATH)IR;  Surgeon: Josafat Hastings DO;  Location: [facility];  Service: Interventional Radiology       OB History          4    Para   2    Term   2            AB         Living             SAB        IAB        Ectopic        Multiple        Live Births   2                 Family History   Problem Relation Age of Onset    No Known Problems Mother     Lung cancer Father     Brain cancer Father     No Known Problems Sister     No Known Problems Maternal Grandmother     No Known Problems Paternal Grandmother     No Known Problems Maternal Aunt     No Known Problems Maternal Aunt     No Known Problems Maternal Aunt     No Known Problems Maternal Aunt     No Known Problems Maternal Aunt     No Known Problems Maternal Aunt     No Known Problems Maternal Aunt     Breast cancer Cousin 28    Arthritis Family     Hypertension Family         essential      Hyperlipidemia Family     Endometrial cancer Neg Hx     Ovarian cancer Neg Hx     Colon cancer Neg Hx     Anesthesia problems Neg Hx        The following portions of the patient's history were reviewed and updated as appropriate: allergies, current medications, past family history, past medical history, past social history, past surgical history, and problem list.      Objective:    Blood pressure 104/70, pulse 61, height 5' (1.524 m), weight 61.7 kg (136 lb), SpO2 97%, not currently breastfeeding.  Body mass index is 26.56 kg/m².    Physical Exam  Vitals reviewed. Exam conducted with a chaperone present.   Constitutional:       General: She is not in acute distress.     Appearance: Normal appearance. She is not ill-appearing.   HENT:      Head: Normocephalic and atraumatic.      Mouth/Throat:      Mouth: Mucous membranes are moist.   Eyes:      General:         Right eye: No discharge.         Left eye: No discharge.      Conjunctiva/sclera: Conjunctivae normal.   Pulmonary:      Effort: Pulmonary effort is normal.   Abdominal:      Palpations: Abdomen is soft. There is no mass.      Tenderness: There is no abdominal tenderness.      Hernia: No hernia is present.   Genitourinary:     Comments: The external female genitalia is normal. The  bartholin's, uretheral and skenes glands are normal. The urethral meatus is normal (midline with no lesions). Anus without fissure or lesion. Speculum exam reveals a grossly normal vagina, foreshortened due to hx RT. No masses, lesions,discharge or bleeding. No significant cystocele or rectocele noted. Bimanual exam notes a surgical absent cervix, uterus and adnexal structures. No masses or fullness. Bladder is without fullness, mass or tenderness.  Musculoskeletal:      Right lower leg: No edema.      Left lower leg: No edema.   Skin:     General: Skin is warm and dry.      Coloration: Skin is not jaundiced.      Findings: No rash.   Neurological:      General: No focal deficit present.      Mental Status: She is alert and oriented to person, place, and time.      Cranial Nerves: No cranial nerve deficit.      Sensory: No sensory deficit.      Motor: No weakness.      Gait: Gait normal.   Psychiatric:         Mood and Affect: Mood normal.         Behavior: Behavior normal.         Thought Content: Thought content normal.         Judgment: Judgment normal.           Lab Results   Component Value Date     15.1 08/08/2023     Lab Results   Component Value Date    WBC 5.44 08/08/2023    HGB 13.3 08/08/2023    HCT 42.0 08/08/2023    MCV 89 08/08/2023     08/08/2023     Lab Results   Component Value Date    K 4.3 08/08/2023     (H) 08/08/2023    CO2 29 08/08/2023    BUN 16 03/07/2024    CREATININE 0.66 03/07/2024    GLUF 90 08/08/2023    CALCIUM 9.2 08/08/2023    CORRECTEDCA 9.1 11/23/2022    AST 17 08/08/2023    ALT 17 08/08/2023    ALKPHOS 74 08/08/2023    EGFR 89 03/07/2024        Trend:  Lab Results   Component Value Date     15.1 08/08/2023     13.8 01/19/2023     15.2 12/29/2022     13.1 12/08/2022     37.3 (H) 11/17/2022     45.5 (H) 10/27/2022     67.6 (H) 09/29/2022     85.4 (H) 09/22/2022     84.9 (H) 09/16/2022       Radiology:  CT CHEST,  ABDOMEN AND PELVIS WITH IV CONTRAST     INDICATION: C54.1: Malignant neoplasm of endometrium. Stage Ia carcinosarcoma of the endometrium     COMPARISON: Lumbar 28 2023     TECHNIQUE: CT examination of the chest, abdomen and pelvis was performed. Multiplanar 2D reformatted images were created from the source data.     This examination, like all CT scans performed in the [facility], was performed utilizing techniques to minimize radiation dose exposure, including the use of iterative reconstruction and automated exposure control. Radiation dose length   product (DLP) for this visit: 571 mGy-cm     IV Contrast: 100 mL of iohexol (OMNIPAQUE)  Enteric Contrast: Not administered.     FINDINGS:     CHEST     LUNGS: No acute consolidation  Trachea and central bronchi are patent  Scattered granuloma seen  No new measurable nodule seen.  The previously noted cluster of micronodules in the right middle lobe image 108 series 3 are stable  PLEURA: No pleural effusion seen  No pneumothorax     HEART/GREAT VESSELS: Heart is unremarkable for patient's age. No thoracic aortic aneurysm.     MEDIASTINUM AND UMAIR: No significant mediastinal lymph node enlargement seen  Large hiatal hernia seen     CHEST WALL AND LOWER NECK: Unremarkable.     ABDOMEN     LIVER/BILIARY TREE: Hypodensity seen left hepatic lobe measuring about 1 cm with geographic margin due to hepatic steatosis     GALLBLADDER: No calcified gallstones. No pericholecystic inflammatory change.     SPLEEN: Unremarkable.     PANCREAS: Unremarkable.     ADRENAL GLANDS: Unremarkable.     KIDNEYS/URETERS: Unremarkable. No hydronephrosis. Subcentimeter hypodensity seen in the right kidney suggests cyst     STOMACH AND BOWEL: Mild thickening of the sigmoid colon seen, also seen on the previous study, stable,  seen in image 117 series 602     APPENDIX: No findings to suggest appendicitis.     ABDOMINOPELVIC CAVITY: No ascites. No pneumoperitoneum. No  lymphadenopathy.     VESSELS: Celiac trunk, SMA are patent  CANDELARIA patent     PELVIS     REPRODUCTIVE ORGANS: Uterus surgically absent     URINARY BLADDER: Unremarkable.     ABDOMINAL WALL/INGUINAL REGIONS: Unremarkable.     BONES: Vertebral hemangioma seen at T11 vertebra     IMPRESSION:     Stable CT.  No new measurable metastatic disease     Small cluster of micronodules noted within the right middle lobe area as described on the previous study are stable     Mild sigmoid [id number] thickening, stable, attention at follow-up suggested     Routine surveillance can be continued

## 2024-04-03 NOTE — ASSESSMENT & PLAN NOTE
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 She completed adjuvant chemotherapy in January 2023, and completed radiation in February 2023. She is feeling well, and has no concerns. She had a CT completed prior to this visit which shows no metastatic disease. \"Micronodules\" in the lung are unchanged. Her pelvic exam is normal. Her PS is 0.    Patient will return to the office in 3 months for continued surveillance, with a  pre-visit . She is aware of warning signs of cancer recurrence and when to call the office.    "

## 2024-04-17 ENCOUNTER — APPOINTMENT (EMERGENCY)
Dept: CT IMAGING | Facility: HOSPITAL | Age: 69
End: 2024-04-17
Payer: MEDICARE

## 2024-04-17 ENCOUNTER — HOSPITAL ENCOUNTER (EMERGENCY)
Facility: HOSPITAL | Age: 69
Discharge: HOME | End: 2024-04-17
Attending: EMERGENCY MEDICINE
Payer: MEDICARE

## 2024-04-17 VITALS
HEART RATE: 72 BPM | RESPIRATION RATE: 16 BRPM | TEMPERATURE: 97.7 F | DIASTOLIC BLOOD PRESSURE: 84 MMHG | OXYGEN SATURATION: 99 % | SYSTOLIC BLOOD PRESSURE: 121 MMHG

## 2024-04-17 DIAGNOSIS — S09.90XA CHI (CLOSED HEAD INJURY): Primary | ICD-10-CM

## 2024-04-17 DIAGNOSIS — S01.01XA LACERATION OF SCALP, INITIAL ENCOUNTER: ICD-10-CM

## 2024-04-17 PROCEDURE — 99284 EMERGENCY DEPT VISIT MOD MDM: CPT | Performed by: PHYSICIAN ASSISTANT

## 2024-04-17 PROCEDURE — 90715 TDAP VACCINE 7 YRS/> IM: CPT | Performed by: PHYSICIAN ASSISTANT

## 2024-04-17 PROCEDURE — 90471 IMMUNIZATION ADMIN: CPT

## 2024-04-17 PROCEDURE — 99284 EMERGENCY DEPT VISIT MOD MDM: CPT

## 2024-04-17 PROCEDURE — 70450 CT HEAD/BRAIN W/O DYE: CPT

## 2024-04-17 PROCEDURE — 12001 RPR S/N/AX/GEN/TRNK 2.5CM/<: CPT | Performed by: PHYSICIAN ASSISTANT

## 2024-04-17 RX ORDER — LIDOCAINE HYDROCHLORIDE AND EPINEPHRINE 10; 10 MG/ML; UG/ML
20 INJECTION, SOLUTION INFILTRATION; PERINEURAL ONCE
Status: COMPLETED | OUTPATIENT
Start: 2024-04-17 | End: 2024-04-17

## 2024-04-17 RX ADMIN — TETANUS TOXOID, REDUCED DIPHTHERIA TOXOID AND ACELLULAR PERTUSSIS VACCINE, ADSORBED 0.5 ML: 5; 2.5; 8; 8; 2.5 SUSPENSION INTRAMUSCULAR at 19:25

## 2024-04-17 RX ADMIN — LIDOCAINE HYDROCHLORIDE,EPINEPHRINE BITARTRATE 20 ML: 10; .01 INJECTION, SOLUTION INFILTRATION; PERINEURAL at 19:23

## 2024-04-17 NOTE — ED PROVIDER NOTES
"History  Chief Complaint   Patient presents with    Head Injury     Hx of intrauterine cancer, cancer free as of January of last year. Patient reports tripping over cords on the floor at home, hitting their head against \"a metal protection strip against sheetrock\" but denies falling all the way to the ground. (-) LOC, (-) thinners or ASA, GCS 14, oriented to person, place and situation but unable to provide month or date. Presents with approximately 1 inch linear cut to right forehead, no active bleeding in triage.      69-year-old female patient here for head injury.  Tripped over wires in the ground and fell forward striking head.  No loss of consciousness.  Has a laceration of the forehead.  Not on anticoagulation or antiplatelets.  Isolated injury.  Apparently during nursing questioning she was disoriented but is currently oriented to self time and location.      History provided by:  Patient   used: No        Prior to Admission Medications   Prescriptions Last Dose Informant Patient Reported? Taking?   LORazepam (ATIVAN) 1 mg tablet  Self No No   Sig: Take 1 tablet (1 mg total) by mouth every 6 (six) hours as needed for anxiety (or nausea)   Multiple Vitamins-Minerals (OCUVITE PO)  Self Yes No   Sig: Take by mouth   Multiple Vitamins-Minerals (multivitamin with minerals) tablet  Self Yes No   Sig: Take 1 tablet by mouth daily   cholecalciferol (VITAMIN D3) 1,000 units tablet  Self Yes No   Sig: Take 1,000 Units by mouth daily   cyanocobalamin (VITAMIN B-12) 100 MCG tablet  Self Yes No   Sig: Take 100 mcg by mouth daily   escitalopram (LEXAPRO) 10 mg tablet  Self No No   Sig: TAKE 1 TABLET (10 MG TOTAL) BY MOUTH DAILY   loratadine-pseudoephedrine (CLARITIN-D 24-HOUR)  mg per 24 hr tablet  Self Yes No   Sig: Take 1 tablet by mouth daily   naloxone (NARCAN) 4 mg/0.1 mL nasal spray  Self No No   Sig: Administer 1 spray into a nostril. If no response after 2-3 minutes, give another dose in " the other nostril using a new spray.   Patient not taking: Reported on 8/29/2023   ondansetron (ZOFRAN) 8 mg tablet  Self No No   Sig: TAKE 1 TABLET (8 MG TOTAL) BY MOUTH EVERY 8 (EIGHT) HOURS AS NEEDED FOR NAUSEA OR VOMITING   Patient not taking: Reported on 8/29/2023   rosuvastatin (CRESTOR) 5 mg tablet  Self No No   Sig: TAKE 1 TABLET (5 MG TOTAL) BY MOUTH DAILY      Facility-Administered Medications: None       Past Medical History:   Diagnosis Date    Anemia     Anxiety     Hiatal hernia     Hypercholesterolemia     Uterine cancer (HCC)        Past Surgical History:   Procedure Laterality Date    COLONOSCOPY      complete     DILATION AND CURETTAGE OF UTERUS      x2     FL GUIDED CENTRAL VENOUS ACCESS DEVICE INSERTION  09/06/2022    HYSTERECTOMY      UT INSJ TUNNELED CTR VAD W/SUBQ PORT AGE 5 YR/> N/A 09/06/2022    Procedure: INSERTION VENOUS PORT ( PORT-A-CATH) IR;  Surgeon: Josafat Hastings DO;  Location: [facility];  Service: Interventional Radiology    UT LAPS TOTAL HYSTERECT 250 GM/< W/RMVL TUBE/OVARY N/A 08/18/2022    Procedure: HYSTERECTOMY LAPAROSCOPIC TOTAL (LTH) W/ ROBOTICS, BILATERAL SALPINGO-OOPHORECTOMY, SENTINEL LYMPH NODE MAPPING AND BIOPSY;  Surgeon: Nehemiah Pillai MD;  Location: [facility];  Service: Gynecology Oncology    UT RMVL ANN-MARIE CTR VAD W/SUBQ PORT/ CTR/PRPH INSJ N/A 01/26/2023    Procedure: REMOVAL VENOUS PORT (PORT-A-CATH)IR;  Surgeon: Josafat Hastings DO;  Location: [facility];  Service: Interventional Radiology       Family History   Problem Relation Age of Onset    No Known Problems Mother     Lung cancer Father     Brain cancer Father     No Known Problems Sister     No Known Problems Maternal Grandmother     No Known Problems Paternal Grandmother     No Known Problems Maternal Aunt     No Known Problems Maternal Aunt     No Known Problems Maternal Aunt     No Known Problems Maternal Aunt     No Known Problems Maternal Aunt     No Known Problems Maternal Aunt     No  Known Problems Maternal Aunt     Breast cancer Cousin 28    Arthritis Family     Hypertension Family         essential      Hyperlipidemia Family     Endometrial cancer Neg Hx     Ovarian cancer Neg Hx     Colon cancer Neg Hx     Anesthesia problems Neg Hx      I have reviewed and agree with the history as documented.    E-Cigarette/Vaping    E-Cigarette Use Never User     Comments Denies any use per pts son      E-Cigarette/Vaping Substances    Nicotine No     THC No     CBD No     Flavoring No     Other No     Unknown No      Social History     Tobacco Use    Smoking status: Never    Smokeless tobacco: Never    Tobacco comments:     Never a smoker or use of any tobacco products per pts son    Vaping Use    Vaping status: Never Used   Substance Use Topics    Alcohol use: No     Comment: No use of alcohol at this time - former use only social use    Drug use: No     Comment: Denies any drug use per pts son       Review of Systems   Constitutional:  Negative for chills and fever.   HENT:  Negative for ear pain and sore throat.    Eyes:  Negative for pain and visual disturbance.   Respiratory:  Negative for cough and shortness of breath.    Cardiovascular:  Negative for chest pain and palpitations.   Gastrointestinal:  Negative for abdominal pain and vomiting.   Genitourinary:  Negative for dysuria and hematuria.   Musculoskeletal:  Negative for arthralgias and back pain.   Skin:  Negative for color change and rash.   Neurological:  Positive for headaches. Negative for seizures and syncope.   All other systems reviewed and are negative.      Physical Exam  Physical Exam  Vitals and nursing note reviewed.   Constitutional:       General: She is not in acute distress.     Appearance: She is well-developed.   HENT:      Head: Normocephalic.   Eyes:      Conjunctiva/sclera: Conjunctivae normal.   Cardiovascular:      Rate and Rhythm: Normal rate and regular rhythm.      Heart sounds: No murmur heard.  Pulmonary:       Effort: Pulmonary effort is normal. No respiratory distress.      Breath sounds: Normal breath sounds.   Abdominal:      Palpations: Abdomen is soft.      Tenderness: There is no abdominal tenderness.   Musculoskeletal:         General: No swelling.      Cervical back: Neck supple.   Skin:     General: Skin is warm and dry.      Capillary Refill: Capillary refill takes less than 2 seconds.   Neurological:      Mental Status: She is alert and oriented to person, place, and time.      GCS: GCS eye subscore is 4. GCS verbal subscore is 5. GCS motor subscore is 6.      Comments: GCS 15. AAOx3. No focal neuro deficits. CN II-XII grossly intact. Speech normal, no aphasia or dysarthria. No pronator drift. Cerebellar function normal. Finger to nose normal. PERRL. EOMI. Peripheral vision intact. No nystagmus. Upper and lower extremity strength 5/5 through.  strength 5/5 b/l. Gross sensation intact b/l.      Psychiatric:         Mood and Affect: Mood normal.       Primary Survey:  A: airway patent  B: breath sounds are present and equal bilaterally throughout all lung fields  C: peripheral pulses intact  D: GCS 15.   E: Pt exposed      Vital Signs  ED Triage Vitals   Temperature Pulse Respirations Blood Pressure SpO2   04/17/24 1810 04/17/24 1810 04/17/24 1810 04/17/24 1810 04/17/24 1810   97.5 °F (36.4 °C) 94 16 160/80 95 %      Temp Source Heart Rate Source Patient Position - Orthostatic VS BP Location FiO2 (%)   04/17/24 1810 04/17/24 1810 04/17/24 1810 04/17/24 1810 --   Oral Monitor Sitting Left arm       Pain Score       04/17/24 1815       4           Vitals:    04/17/24 1810 04/17/24 1830 04/17/24 1929 04/17/24 1930   BP: 160/80 151/70  131/69   Pulse: 94 85 87 82   Patient Position - Orthostatic VS: Sitting            Visual Acuity  Visual Acuity      Flowsheet Row Most Recent Value   L Pupil Size (mm) 3   R Pupil Size (mm) 3            ED Medications  Medications   lidocaine-epinephrine (XYLOCAINE/EPINEPHRINE) 1  %-1:100,000 injection 20 mL (20 mL Infiltration Given 4/17/24 1923)   tetanus-diphtheria-acellular pertussis (BOOSTRIX) IM injection 0.5 mL (0.5 mL Intramuscular Given 4/17/24 1925)       Diagnostic Studies  Results Reviewed       None                   CT head without contrast   Final Result by Gideon Collier MD (04/17 2003)      No acute intracranial abnormality.                  Workstation performed: [id number]                    Procedures  Universal Protocol:  Consent: Verbal consent obtained.  Consent given by: patient  Patient understanding: patient states understanding of the procedure being performed  Patient identity confirmed: verbally with patient, arm band, provided demographic data and hospital-assigned identification number  Laceration repair    Date/Time: 4/17/2024 8:14 PM    Performed by: Nick Whitlock PA-C  Authorized by: Nick Whitlock PA-C  Anesthesia: local infiltration    Wound Dehiscence:  Superficial Wound Dehiscence: simple closure      Procedure Details:  Irrigation solution: saline  Irrigation method: syringe  Amount of cleaning: standard  Debridement: none  Degree of undermining: none  Number of sutures: 5  Technique: simple  Approximation: close  Approximation difficulty: simple               ED Course                               SBIRT 20yo+      Flowsheet Row Most Recent Value   Initial Alcohol Screen: US AUDIT-C     1. How often do you have a drink containing alcohol? 0 Filed at: 04/17/2024 1843   2. How many drinks containing alcohol do you have on a typical day you are drinking?  0 Filed at: 04/17/2024 1843   3a. Male UNDER 65: How often do you have five or more drinks on one occasion? 0 Filed at: 04/17/2024 1843   3b. FEMALE Any Age, or MALE 65+: How often do you have 4 or more drinks on one occassion? 0 Filed at: 04/17/2024 1843   Audit-C Score 0 Filed at: 04/17/2024 1843   TESHA: How many times in the past year have you...    Used an illegal drug or used a  prescription medication for non-medical reasons? Never Filed at: 04/17/2024 1843                      Medical Decision Making  Differential diagnosis includes but is not limited to tension, migraine, concussion, laceration, intracranial hemorrhage, fracture.  Plan: CT head.  Dispo pending.    MDM 69-year-old with head injury.  CT head negative.  Laceration repaired with 5 simple interrupted sutures.  Tolerated procedure well.  Discussed follow-up and return parameters.  Patient understands and agrees with this plan.    Amount and/or Complexity of Data Reviewed  Radiology: ordered.    Risk  Prescription drug management.             Disposition  Final diagnoses:   CHI (closed head injury)   Laceration of scalp, initial encounter     Time reflects when diagnosis was documented in both MDM as applicable and the Disposition within this note       Time User Action Codes Description Comment    4/17/2024  8:08 PM Nick Whitlock Add [S09.90XA] CHI (closed head injury)     4/17/2024  8:08 PM Nick Whitlock Add [S01.01XA] Laceration of scalp, initial encounter           ED Disposition       ED Disposition   Discharge    Condition   Stable    Date/Time   Wed Apr 17, 2024 2008    Comment   Haether José discharge to home/self care.                   Follow-up Information       Follow up With Specialties Details Why Contact Info    Laurie Cheatham MD Family Medicine Call  [address]   [address]  [facility] [address] [phone]              Patient's Medications   Discharge Prescriptions    No medications on file       No discharge procedures on file.    PDMP Review         Value Time User    PDMP Reviewed  Yes 9/1/2022  2:27 PM WENDY Barragan            ED Provider  Electronically Signed by             Nick Whitlock PA-C  04/17/24 2014

## 2024-04-17 NOTE — ED NOTES
Pt returned from CT. Alert and oriented. Resp even and unlabored with no distress noted at this time.   MD at bedside preforming Lac repair.    at bedside. Will continue to monitor.      Alma Rosa Ordoñez RN  04/17/24 1923

## 2024-04-18 NOTE — DISCHARGE INSTRUCTIONS
Return sooner to the Emergency Department if increased pain, fever, pus, redness, swelling, red streaks, vomiting, weakness, numbness, bleeding. Elevate. Keep wound dry for 2 days. Sutures/staples out in 7 days.

## 2024-07-03 ENCOUNTER — TELEPHONE (OUTPATIENT)
Dept: GYNECOLOGIC ONCOLOGY | Facility: CLINIC | Age: 69
End: 2024-07-03

## 2024-07-06 ENCOUNTER — APPOINTMENT (OUTPATIENT)
Dept: LAB | Facility: HOSPITAL | Age: 69
End: 2024-07-06
Payer: MEDICARE

## 2024-07-06 DIAGNOSIS — C54.1 MALIGNANT NEOPLASM OF ENDOMETRIUM (HCC): ICD-10-CM

## 2024-07-06 DIAGNOSIS — C55 UTERINE CARCINOSARCOMA (HCC): ICD-10-CM

## 2024-07-06 LAB
BUN SERPL-MCNC: 16 MG/DL (ref 5–25)
CANCER AG125 SERPL-ACNC: 17.9 U/ML (ref 0–35)
CREAT SERPL-MCNC: 0.71 MG/DL (ref 0.6–1.3)
GFR SERPL CREATININE-BSD FRML MDRD: 85 ML/MIN/1.73SQ M

## 2024-07-06 PROCEDURE — 36415 COLL VENOUS BLD VENIPUNCTURE: CPT

## 2024-07-06 PROCEDURE — 84520 ASSAY OF UREA NITROGEN: CPT

## 2024-07-06 PROCEDURE — 86304 IMMUNOASSAY TUMOR CA 125: CPT

## 2024-07-06 PROCEDURE — 82565 ASSAY OF CREATININE: CPT

## 2024-07-09 ENCOUNTER — OFFICE VISIT (OUTPATIENT)
Dept: GYNECOLOGIC ONCOLOGY | Facility: CLINIC | Age: 69
End: 2024-07-09
Payer: MEDICARE

## 2024-07-09 VITALS
HEIGHT: 60 IN | WEIGHT: 137 LBS | RESPIRATION RATE: 16 BRPM | OXYGEN SATURATION: 98 % | DIASTOLIC BLOOD PRESSURE: 84 MMHG | SYSTOLIC BLOOD PRESSURE: 128 MMHG | BODY MASS INDEX: 26.9 KG/M2 | TEMPERATURE: 97.7 F | HEART RATE: 95 BPM

## 2024-07-09 DIAGNOSIS — C55 UTERINE CARCINOSARCOMA (HCC): Primary | ICD-10-CM

## 2024-07-09 DIAGNOSIS — C54.9 MALIGNANT NEOPLASM OF CORPUS UTERI, UNSPECIFIED (HCC): ICD-10-CM

## 2024-07-09 DIAGNOSIS — C57.7 MALIGNANT NEOPLASM OF OTHER SPECIFIED FEMALE GENITAL ORGANS (HCC): ICD-10-CM

## 2024-07-09 PROCEDURE — 99214 OFFICE O/P EST MOD 30 MIN: CPT | Performed by: OBSTETRICS & GYNECOLOGY

## 2024-07-09 NOTE — ASSESSMENT & PLAN NOTE
Patient is a very pleasant 69-year-old female with a history of stage Ia carcinosarcoma of the uterus treated with surgery chemo and radiation therapy completed approximately a year ago.  She has a slow rise in her  but she is asymptomatic.  Her  still is within the normal range.  We recommended follow-up with  in 3 months with PET/CT scan at that time to rule out recurrence.

## 2024-07-09 NOTE — PROGRESS NOTES
Assessment/Plan:    Problem List Items Addressed This Visit       Uterine carcinosarcoma (HCC) - Primary     Patient is a very pleasant 69-year-old female with a history of stage Ia carcinosarcoma of the uterus treated with surgery chemo and radiation therapy completed approximately a year ago.  She has a slow rise in her  but she is asymptomatic.  Her  still is within the normal range.  We recommended follow-up with  in 3 months with PET/CT scan at that time to rule out recurrence.         Relevant Orders    NM PET CT skull base to mid thigh     Other Visit Diagnoses       Malignant neoplasm of other specified female genital organs (HCC)        Relevant Orders    NM PET CT skull base to mid thigh    Malignant neoplasm of corpus uteri, unspecified (HCC)                  CHIEF COMPLAINT: Surveillance endometrial cancer      Problem:  Cancer Staging   Uterine carcinosarcoma (HCC)  Staging form: Corpus Uteri - Carcinoma, AJCC 8th Edition  - Clinical stage from 8/26/2022: FIGO Stage IA (cT1a, cN0, cM0) - Signed by Nehemiah Pillai MD on 8/26/2022        Previous therapy:  Oncology History   Uterine carcinosarcoma (HCC)   7/20/2022 Biopsy    Endometrium, EMB:  - Markedly atypical glandular and stromal elements, suspicious for carcinosarcoma.  See comment.     Comment: There are superficial strips of atypical glandular elements which are diffusely positive for p53 by immunohistochemistry.  Additionally, the the stromal elements demonstrate significant nuclear pleomorphism.  The overall findings are suspicious for carcinosarcoma.     8/18/2022 Initial Diagnosis    Uterine carcinosarcoma (HCC)     8/26/2022 -  Cancer Staged    Staging form: Corpus Uteri - Carcinoma, AJCC 8th Edition  - Clinical stage from 8/26/2022: FIGO Stage IA (cT1a, cN0, cM0) - Signed by Nehemiah Pillai MD on 8/26/2022  Histopathologic type: Carcinosarcoma  Stage prefix: Initial diagnosis  Histologic grade (G): G3  Histologic grading  system: 3 grade system       8/2022 Surgery    Patient and did went robotic hysterectomy bilateral salpingo-oophorectomy sentinel lymph node mapping and biopsy for stage I A non invasive carcinosarcoma of the endometrium.  The uterus did fracture within the vagina but no leakage into the abdomen was noted.     9/19/2022 - 1/4/2023 Chemotherapy    palonosetron (ALOXI), 0.25 mg, Intravenous, Once, 6 of 6 cycles  Administration: 0.25 mg (9/19/2022), 0.25 mg (10/10/2022), 0.25 mg (10/31/2022), 0.25 mg (11/21/2022), 0.25 mg (12/12/2022), 0.25 mg (1/3/2023)  pegfilgrastim (NEULASTA), 6 mg, Subcutaneous, Once, 5 of 5 cycles  Administration: 6 mg (10/11/2022), 6 mg (11/1/2022), 6 mg (11/22/2022), 6 mg (12/13/2022), 6 mg (1/4/2023)  fosaprepitant (EMEND) IVPB, 150 mg, Intravenous, Once, 6 of 6 cycles  Administration: 150 mg (9/19/2022), 150 mg (10/10/2022), 150 mg (10/31/2022), 150 mg (11/21/2022), 150 mg (12/12/2022), 150 mg (1/3/2023)  CARBOplatin (PARAPLATIN) IVPB (GOG AUC DOSING), 572.4 mg, Intravenous, Once, 6 of 6 cycles  Administration: 572.4 mg (9/19/2022), 579.6 mg (10/10/2022), 579.6 mg (10/31/2022), 575.4 mg (11/21/2022), 564 mg (12/12/2022), 564 mg (1/3/2023)  PACLItaxel (TAXOL) chemo IVPB, 175 mg/m2 = 274.8 mg, Intravenous, Once, 6 of 6 cycles  Administration: 274.8 mg (9/19/2022), 278.4 mg (10/10/2022), 278.4 mg (10/31/2022), 276.6 mg (11/21/2022), 273 mg (12/12/2022), 273 mg (1/3/2023)     2/1/2023 - 2/10/2023 Radiation    Treatments:  Course: C1 HDR 2.5cmCyl  Plan ID Energy Source Fractions Dose per Fraction (cGy) Rx Dose Total Dose Delivered (cGy) Elapsed Days   HDR2.5cm cylinder diameter  Ir-192 3 / 3 700 5mm from cylinder surface 2,100 9    Treatment Dates:  2/1/2023, 2/6/2023, 2/10/2023.            Patient ID: Heather José is a 69 y.o. female  Patient is a very pleasant 69-year-old female with history of stage Ia carcinosarcoma of the uterus.  She underwent robotically assisted total laparoscopic  hysterectomy bilateral salpingo-oophorectomy sentinel lymph node mapping and biopsy followed by 6 cycles of carboplatin and paclitaxel and vaginal brachytherapy.  She has been without evidence of recurrence of disease since that time.  The patient was seen 3 months ago.  In the interim she has no change.  Her  has increased from 15-17.  It was 13 3 months before that.  There is an upgoing trend however the patient remains asymptomatic.    Today, the patient is doing well.  She denies significant abdominal pain, pelvic pain, nausea, vomiting, constipation, diarrhea, fevers, chills, or vaginal bleeding.  Patient notes that her neuropathy is slowly improving of hands and feet.           The following portions of the patient's history were reviewed and updated as appropriate: allergies, current medications, past family history, past medical history, past social history, past surgical history, and problem list.    Review of Systems   Constitutional: Negative.    HENT: Negative.     Eyes: Negative.    Respiratory: Negative.     Cardiovascular: Negative.    Gastrointestinal: Negative.    Endocrine: Negative.    Genitourinary: Negative.    Musculoskeletal: Negative.    Skin: Negative.    Neurological:  Positive for numbness.   Hematological: Negative.    Psychiatric/Behavioral: Negative.         Current Outpatient Medications   Medication Sig Dispense Refill    cholecalciferol (VITAMIN D3) 1,000 units tablet Take 1,000 Units by mouth daily      cyanocobalamin (VITAMIN B-12) 100 MCG tablet Take 100 mcg by mouth daily      escitalopram (LEXAPRO) 10 mg tablet TAKE 1 TABLET (10 MG TOTAL) BY MOUTH DAILY 90 tablet 3    loratadine-pseudoephedrine (CLARITIN-D 24-HOUR)  mg per 24 hr tablet Take 1 tablet by mouth daily      LORazepam (ATIVAN) 1 mg tablet Take 1 tablet (1 mg total) by mouth every 6 (six) hours as needed for anxiety (or nausea) 36 tablet 1    Multiple Vitamins-Minerals (multivitamin with minerals) tablet  Take 1 tablet by mouth daily      Multiple Vitamins-Minerals (OCUVITE PO) Take by mouth      rosuvastatin (CRESTOR) 5 mg tablet TAKE 1 TABLET (5 MG TOTAL) BY MOUTH DAILY 90 tablet 3    naloxone (NARCAN) 4 mg/0.1 mL nasal spray Administer 1 spray into a nostril. If no response after 2-3 minutes, give another dose in the other nostril using a new spray. (Patient not taking: Reported on 8/29/2023) 1 each 1    ondansetron (ZOFRAN) 8 mg tablet TAKE 1 TABLET (8 MG TOTAL) BY MOUTH EVERY 8 (EIGHT) HOURS AS NEEDED FOR NAUSEA OR VOMITING (Patient not taking: Reported on 8/29/2023) 30 tablet 1     No current facility-administered medications for this visit.           Objective:    Blood pressure 128/84, pulse 95, temperature 97.7 °F (36.5 °C), temperature source Temporal, resp. rate 16, height 5' (1.524 m), weight 62.1 kg (137 lb), SpO2 98%, not currently breastfeeding.  Body mass index is 26.76 kg/m².  Body surface area is 1.59 meters squared.    Physical Exam  Constitutional:       Appearance: She is well-developed.   HENT:      Head: Normocephalic and atraumatic.   Neck:      Thyroid: No thyromegaly.   Cardiovascular:      Rate and Rhythm: Normal rate and regular rhythm.      Heart sounds: Normal heart sounds.   Pulmonary:      Effort: Pulmonary effort is normal.      Breath sounds: Normal breath sounds.   Abdominal:      General: Bowel sounds are normal.      Palpations: Abdomen is soft.      Comments: Well healed laparoscopic incisions.   Genitourinary:     Comments: -Normal external female genitalia, normal Bartholin's and Canfield's glands                  -Normal midline urethral meatus. No lesions notes                  -Bladder without fullness mass or tenderness                  -Vagina without lesion or discharge No significant cystocele or rectocele noted                  -Cervix surgically absent                  -Uterus surgically absent                  -Adnexae surgically absent                  - Anus without  fissure of lesion    Musculoskeletal:         General: Normal range of motion.      Cervical back: Normal range of motion and neck supple.   Lymphadenopathy:      Cervical: No cervical adenopathy.   Skin:     General: Skin is warm and dry.   Neurological:      Mental Status: She is alert and oriented to person, place, and time.   Psychiatric:         Behavior: Behavior normal.         Lab Results   Component Value Date     17.9 07/06/2024     Lab Results   Component Value Date    K 4.3 08/08/2023     (H) 08/08/2023    CO2 29 08/08/2023    BUN 16 07/06/2024    CREATININE 0.71 07/06/2024    GLUF 90 08/08/2023    CALCIUM 9.2 08/08/2023    CORRECTEDCA 9.1 11/23/2022    AST 17 08/08/2023    ALT 17 08/08/2023    ALKPHOS 74 08/08/2023    EGFR 85 07/06/2024     Lab Results   Component Value Date    WBC 5.44 08/08/2023    HGB 13.3 08/08/2023    HCT 42.0 08/08/2023    MCV 89 08/08/2023     08/08/2023     Lab Results   Component Value Date    NEUTROABS 3.23 08/08/2023        Trend:  Lab Results   Component Value Date     17.9 07/06/2024     15.1 08/08/2023     13.8 01/19/2023     15.2 12/29/2022     13.1 12/08/2022     37.3 (H) 11/17/2022     45.5 (H) 10/27/2022     67.6 (H) 09/29/2022     85.4 (H) 09/22/2022     84.9 (H) 09/16/2022

## 2024-08-30 ENCOUNTER — HOSPITAL ENCOUNTER (EMERGENCY)
Facility: HOSPITAL | Age: 69
Discharge: HOME | End: 2024-08-30
Attending: EMERGENCY MEDICINE
Payer: MEDICARE

## 2024-08-30 ENCOUNTER — APPOINTMENT (EMERGENCY)
Dept: CT IMAGING | Facility: HOSPITAL | Age: 69
End: 2024-08-30
Payer: MEDICARE

## 2024-08-30 VITALS
TEMPERATURE: 97.8 F | DIASTOLIC BLOOD PRESSURE: 62 MMHG | OXYGEN SATURATION: 92 % | WEIGHT: 121.03 LBS | BODY MASS INDEX: 23.64 KG/M2 | SYSTOLIC BLOOD PRESSURE: 123 MMHG | RESPIRATION RATE: 18 BRPM | HEART RATE: 81 BPM

## 2024-08-30 DIAGNOSIS — M54.50 ACUTE LOW BACK PAIN: ICD-10-CM

## 2024-08-30 DIAGNOSIS — S32.019A L1 VERTEBRAL FRACTURE (HCC): Primary | ICD-10-CM

## 2024-08-30 LAB
ANION GAP SERPL CALCULATED.3IONS-SCNC: 8 MMOL/L (ref 4–13)
BASOPHILS # BLD AUTO: 0.06 THOUSANDS/ÂΜL (ref 0–0.1)
BASOPHILS NFR BLD AUTO: 1 % (ref 0–1)
BUN SERPL-MCNC: 13 MG/DL (ref 5–25)
CALCIUM SERPL-MCNC: 9.3 MG/DL (ref 8.4–10.2)
CHLORIDE SERPL-SCNC: 104 MMOL/L (ref 96–108)
CO2 SERPL-SCNC: 26 MMOL/L (ref 21–32)
CREAT SERPL-MCNC: 0.65 MG/DL (ref 0.6–1.3)
EOSINOPHIL # BLD AUTO: 0.19 THOUSAND/ÂΜL (ref 0–0.61)
EOSINOPHIL NFR BLD AUTO: 2 % (ref 0–6)
ERYTHROCYTE [DISTWIDTH] IN BLOOD BY AUTOMATED COUNT: 13.2 % (ref 11.6–15.1)
GFR SERPL CREATININE-BSD FRML MDRD: 89 ML/MIN/1.73SQ M
GLUCOSE SERPL-MCNC: 92 MG/DL (ref 65–140)
HCT VFR BLD AUTO: 42.1 % (ref 34.8–46.1)
HGB BLD-MCNC: 13.1 G/DL (ref 11.5–15.4)
IMM GRANULOCYTES # BLD AUTO: 0.02 THOUSAND/UL (ref 0–0.2)
IMM GRANULOCYTES NFR BLD AUTO: 0 % (ref 0–2)
LYMPHOCYTES # BLD AUTO: 1.95 THOUSANDS/ÂΜL (ref 0.6–4.47)
LYMPHOCYTES NFR BLD AUTO: 25 % (ref 14–44)
MCH RBC QN AUTO: 27.5 PG (ref 26.8–34.3)
MCHC RBC AUTO-ENTMCNC: 31.1 G/DL (ref 31.4–37.4)
MCV RBC AUTO: 88 FL (ref 82–98)
MONOCYTES # BLD AUTO: 0.58 THOUSAND/ÂΜL (ref 0.17–1.22)
MONOCYTES NFR BLD AUTO: 7 % (ref 4–12)
NEUTROPHILS # BLD AUTO: 5.04 THOUSANDS/ÂΜL (ref 1.85–7.62)
NEUTS SEG NFR BLD AUTO: 65 % (ref 43–75)
NRBC BLD AUTO-RTO: 0 /100 WBCS
PLATELET # BLD AUTO: 297 THOUSANDS/UL (ref 149–390)
PMV BLD AUTO: 8.9 FL (ref 8.9–12.7)
POTASSIUM SERPL-SCNC: 4.4 MMOL/L (ref 3.5–5.3)
RBC # BLD AUTO: 4.76 MILLION/UL (ref 3.81–5.12)
SODIUM SERPL-SCNC: 138 MMOL/L (ref 135–147)
WBC # BLD AUTO: 7.84 THOUSAND/UL (ref 4.31–10.16)

## 2024-08-30 PROCEDURE — 97161 PT EVAL LOW COMPLEX 20 MIN: CPT

## 2024-08-30 PROCEDURE — 99285 EMERGENCY DEPT VISIT HI MDM: CPT

## 2024-08-30 PROCEDURE — 96374 THER/PROPH/DIAG INJ IV PUSH: CPT

## 2024-08-30 PROCEDURE — 99284 EMERGENCY DEPT VISIT MOD MDM: CPT

## 2024-08-30 PROCEDURE — 80048 BASIC METABOLIC PNL TOTAL CA: CPT

## 2024-08-30 PROCEDURE — 96375 TX/PRO/DX INJ NEW DRUG ADDON: CPT

## 2024-08-30 PROCEDURE — 85025 COMPLETE CBC W/AUTO DIFF WBC: CPT

## 2024-08-30 PROCEDURE — 36415 COLL VENOUS BLD VENIPUNCTURE: CPT

## 2024-08-30 PROCEDURE — 74177 CT ABD & PELVIS W/CONTRAST: CPT

## 2024-08-30 RX ORDER — NAPROXEN 500 MG/1
500 TABLET ORAL 2 TIMES DAILY WITH MEALS
Qty: 30 TABLET | Refills: 0 | Status: SHIPPED | OUTPATIENT
Start: 2024-08-30

## 2024-08-30 RX ORDER — METHOCARBAMOL 500 MG/1
500 TABLET, FILM COATED ORAL 4 TIMES DAILY
Qty: 20 TABLET | Refills: 0 | Status: SHIPPED | OUTPATIENT
Start: 2024-08-30

## 2024-08-30 RX ORDER — DEXAMETHASONE SODIUM PHOSPHATE 10 MG/ML
10 INJECTION, SOLUTION INTRAMUSCULAR; INTRAVENOUS ONCE
Status: COMPLETED | OUTPATIENT
Start: 2024-08-30 | End: 2024-08-30

## 2024-08-30 RX ORDER — LIDOCAINE 50 MG/G
1 PATCH TOPICAL DAILY
Qty: 30 PATCH | Refills: 0 | Status: SHIPPED | OUTPATIENT
Start: 2024-08-30

## 2024-08-30 RX ORDER — DEXAMETHASONE 6 MG/1
6 TABLET ORAL
Qty: 5 TABLET | Refills: 0 | Status: SHIPPED | OUTPATIENT
Start: 2024-08-30 | End: 2024-09-04

## 2024-08-30 RX ORDER — METHOCARBAMOL 500 MG/1
500 TABLET, FILM COATED ORAL ONCE
Status: COMPLETED | OUTPATIENT
Start: 2024-08-30 | End: 2024-08-30

## 2024-08-30 RX ORDER — LIDOCAINE 50 MG/G
1 PATCH TOPICAL ONCE
Status: DISCONTINUED | OUTPATIENT
Start: 2024-08-30 | End: 2024-08-30 | Stop reason: HOSPADM

## 2024-08-30 RX ORDER — KETOROLAC TROMETHAMINE 30 MG/ML
15 INJECTION, SOLUTION INTRAMUSCULAR; INTRAVENOUS ONCE
Status: COMPLETED | OUTPATIENT
Start: 2024-08-30 | End: 2024-08-30

## 2024-08-30 RX ADMIN — METHOCARBAMOL TABLETS 500 MG: 500 TABLET, COATED ORAL at 10:59

## 2024-08-30 RX ADMIN — IOHEXOL 100 ML: 350 INJECTION, SOLUTION INTRAVENOUS at 12:01

## 2024-08-30 RX ADMIN — KETOROLAC TROMETHAMINE 15 MG: 30 INJECTION, SOLUTION INTRAMUSCULAR at 11:06

## 2024-08-30 RX ADMIN — LIDOCAINE 1 PATCH: 50 PATCH CUTANEOUS at 10:59

## 2024-08-30 RX ADMIN — DEXAMETHASONE SODIUM PHOSPHATE 10 MG: 10 INJECTION, SOLUTION INTRAMUSCULAR; INTRAVENOUS at 11:05

## 2024-08-30 NOTE — PHYSICAL THERAPY NOTE
Physical Therapy Evaluation    Patient's Name: Heather José    Admitting Diagnosis  Back pain [M54.9]    Problem List  Patient Active Problem List   Diagnosis    Screening mammogram, encounter for    Situational anxiety    Arthralgia    Hyperlipidemia, mixed    Anxiety    Malignant neoplasm of endometrium (HCC)    History of robot-assisted laparoscopic hysterectomy    Uterine carcinosarcoma (HCC)       Past Medical History  Past Medical History:   Diagnosis Date    Anemia     Anxiety     Hiatal hernia     Hypercholesterolemia     Uterine cancer (HCC)        Past Surgical History  Past Surgical History:   Procedure Laterality Date    COLONOSCOPY      complete     DILATION AND CURETTAGE OF UTERUS      x2     FL GUIDED CENTRAL VENOUS ACCESS DEVICE INSERTION  09/06/2022    HYSTERECTOMY      NV INSJ TUNNELED CTR VAD W/SUBQ PORT AGE 5 YR/> N/A 09/06/2022    Procedure: INSERTION VENOUS PORT ( PORT-A-CATH) IR;  Surgeon: Josafat Hastings DO;  [facility];  Service: Interventional Radiology    NV LAPS TOTAL HYSTERECT 250 GM/< W/RMVL TUBE/OVARY N/A 08/18/2022    Procedure: HYSTERECTOMY LAPAROSCOPIC TOTAL (LTH) W/ ROBOTICS, BILATERAL SALPINGO-OOPHORECTOMY, SENTINEL LYMPH NODE MAPPING AND BIOPSY;  Surgeon: Nehemiah Pillai MD;  [facility];  Service: Gynecology Oncology    NV RMVL ANN-MARIE CTR VAD W/SUBQ PORT/ CTR/PRPH INSJ N/A 01/26/2023    Procedure: REMOVAL VENOUS PORT (PORT-A-CATH)IR;  Surgeon: Josafat Hastings DO;  Location: [facility];  Service: Interventional Radiology       Recent Imaging  CT abdomen pelvis with contrast   Final Result by Rosa Amador MD (08/30 3204)   There is new central depression of the inferior endplate of the L1 vertebra with about 1520% loss of the vertebral height compatible with acute fracture likely osteoporotic      Transitional lumbosacral anatomy   No acute inflammatory stranding within the abdomen and pelvis   Hiatal hernia      Subcentimeter hypodensity  within the upper pole of the right kidney measuring about 5 mm, stable attention at follow-up surveillance scan in 6 months to 1 year      The study was marked in EPIC for significant notification.      Workstation performed: [id number]         CT recon only lumbar spine   Final Result by Rosa Amador MD (08/30 1316)      Fracture through the inferior endplate of the L1 vertebra with central depression of the inferior endplate with about 20% loss of vertebral height.      Preserved posterior vertebral margin         Workstation performed: [id number]             Recent Vital Signs  Vitals:    08/30/24 1006 08/30/24 1130 08/30/24 1300 08/30/24 1315   BP: 152/67 114/63  123/62   BP Location: Right arm   Right arm   Pulse: 102 82 81    Resp: 18 18 18    Temp: 97.8 °F (36.6 °C)      TempSrc: Temporal      SpO2: 97% 96% 92%    Weight: 54.9 kg (121 lb 0.5 oz)             08/30/24 1416   PT Last Visit   PT Visit Date 08/30/24   Note Type   Note type Evaluation   Pain Assessment   Pain Assessment Tool 0-10   Pain Score No Pain   Restrictions/Precautions   Braces or Orthoses LSO  (fitted for LSO)   Other Precautions Spinal precautions;Fall Risk   Home Living   Type of Home House   Home Layout Two level;Bed/bath upstairs;Performs ADLs on one level;Able to live on main level with bedroom/bathroom  (bi level, 3 GUNNAR, 6 up and 6 down)   Bathroom Shower/Tub Tub/shower unit   Bathroom Toilet Raised   Bathroom Equipment Shower chair   Bathroom Accessibility Accessible   Home Equipment Walker;Cane   Prior Function   Level of [place] Independent with ADLs;Independent with functional mobility;Independent with IADLS   Lives With Son  (dIL)   Receives Help From Family   IADLs Independent with driving;Independent with meal prep;Independent with medication management   Falls in the last 6 months 1 to 4  (two mondays ago)   Vocational Part time employment  ([employer])   General   Family/Caregiver Present No   Cognition   Overall  Cognitive Status WFL   Arousal/Participation Alert   Orientation Level Oriented X4   Memory Within functional limits   Following Commands Follows all commands and directions without difficulty   RLE Assessment   RLE Assessment WNL  (4+/5)   LLE Assessment   LLE Assessment WNL  (4+/5)   Vision-Basic Assessment   Current Vision Wears glasses all the time   Coordination   Sensation WFL   Bed Mobility   Additional Comments Pt standing in room at start and end of session   Transfers   Sit to Stand 6  Modified independent   Additional items Increased time required   Stand to Sit 6  Modified independent   Additional items Increased time required   Ambulation/Elevation   Gait pattern WNL   Gait Assistance 5  Supervision   Assistive Device None   Distance 10'   Balance   Static Sitting Good   Dynamic Sitting Good   Static Standing Fair +   Dynamic Standing Fair +   Ambulatory Fair +   Activity Tolerance   Nurse Made Aware Spoke to RN   Assessment   Prognosis Good   Problem List Decreased endurance;Orthopedic restrictions   Assessment Heather José is a 69 y.o. female admitted to [facility] on 8/30/2024 for back pain. Pt  has a past medical history of Anemia, Anxiety, Hiatal hernia, Hypercholesterolemia, and Uterine cancer (HCC).. Order placed for PT eval and tx. PT was consulted and pt was seen on 8/30/2024 for mobility assessment and d/c planning. Chart review and two person identifiers were completed.   Currently pt presents with decreased strength  and decreased muscular endurance . Due to these impairments, they will require assistance to perform ambulation and stair negotiation. Pt is currently functioning at a modified independent assistance level for transfers, supervision assistance x1 level for ambulation with no assistive device. These activity limitations significantly impact their ability to participate in previous home and community roles and responsibilities  and ambulation in the community. The patient's AM-PAC  Basic Mobility Inpatient Short Form Raw Score is 22. PT recommends level III minimum resource intensity. They will benefit from skilled therapy to to reduce the risk of falls and to maximize functional potential.  Pt sized w/ LSO prior to PT mobility assessment, see PT evaluation for assessment findings. Pt educated on brace components, wearing schedule when OOB per MD recommendation, maintenance of brace, donning/doffing, skin inspection. Educated that brace may need to be repositioned throughout the day. Pt verbalized understanding w/ all components. Pt educated on spinal precautions, no bending, lifting or twisting, and log roll technique for bed mobility. Pt verbalized understanding with precautions.   AM-PAC Basic Mobility Inpatient   Turning in Flat Bed Without Bedrails 4   Lying on Back to Sitting on Edge of Flat Bed Without Bedrails 4   Moving Bed to Chair 4   Standing Up From Chair Using Arms 4   Walk in Room 3   Climb 3-5 Stairs With Railing 3   Basic Mobility Inpatient Raw Score 22   Basic Mobility Standardized Score 47.4   [facility] Highest Level Of Mobility   JH-HLM Goal 7: Walk 25 feet or more   JH-HLM Achieved 6: Walk 10 steps or more   End of Consult   Patient Position at End of Consult Standing;All needs within reach         PT Evaluation Time: 1886-1301    Walter Hancock, PT, DPT

## 2024-08-30 NOTE — Clinical Note
Heather José was seen and treated in our emergency department on 8/30/2024.                Diagnosis:     Heather  may return to work on return date.    She may return on this date:          If you have any questions or concerns, please don't hesitate to call.      Jennifer Polo RN    ______________________________           _______________          _______________  Hospital Representative                              Date                                Time

## 2024-08-30 NOTE — DISCHARGE INSTRUCTIONS
Tylenol/Naproxen  Decadron-take with food  Lidocaine patch- change every 12 hours  Robaxin-may make you sleepy, STOP taking flexeril  Wear LSO brace for comfort/support  Follow up with comprehensive spine program  Return to ER if symptoms worsen   Spine appears normal, range of motion is not limited, no muscle or joint tenderness

## 2024-08-30 NOTE — ED PROVIDER NOTES
"History  Chief Complaint   Patient presents with    Back Pain     Pt presents ambulatory c/o \"non radiating bilateral lower back pain x1 wk after carrying her 2 year old grandson down the stairs. Hx of sciatica. Took advil and tylenol combo at 0630 today\"     70 y/o female patient presents to the ER for evaluation of back pain.  Patient stated that approximately 1 week ago she was carrying her 2-year-old grandson down steps when she slipped and caught herself and bill her back.  Patient did not strike her back on the steps and did not fall down the stairs.  Patient states that since then her back has been sore.  Pain is worse with range of motion.  Pain radiates down bilateral legs and across her lumbar spine.  Pain is dull and aching and constant.  Patient started taking Flexeril 2 days ago without relief.  Patient states that she has no ecchymosis, erythema or abrasion to her back.  No noted bowel or bladder incontinence, saddle paresthesia, immunosuppression.  Patient does have extensive malignancy history with radiation and chemotherapy with a hysterectomy.  Patient states that she does have osteoporosis.  She is not currently taking any medications for her osteoporosis.  She denies any flank pain, hematuria or UTI symptoms.  No noted fever, chills, nausea or vomiting.        Prior to Admission Medications   Prescriptions Last Dose Informant Patient Reported? Taking?   LORazepam (ATIVAN) 1 mg tablet  Self No No   Sig: Take 1 tablet (1 mg total) by mouth every 6 (six) hours as needed for anxiety (or nausea)   Multiple Vitamins-Minerals (OCUVITE PO)  Self Yes No   Sig: Take by mouth   Multiple Vitamins-Minerals (multivitamin with minerals) tablet  Self Yes No   Sig: Take 1 tablet by mouth daily   cholecalciferol (VITAMIN D3) 1,000 units tablet  Self Yes No   Sig: Take 1,000 Units by mouth daily   cyanocobalamin (VITAMIN B-12) 100 MCG tablet  Self Yes No   Sig: Take 100 mcg by mouth daily   escitalopram (LEXAPRO) " 10 mg tablet  Self No No   Sig: TAKE 1 TABLET (10 MG TOTAL) BY MOUTH DAILY   loratadine-pseudoephedrine (CLARITIN-D 24-HOUR)  mg per 24 hr tablet  Self Yes No   Sig: Take 1 tablet by mouth daily   naloxone (NARCAN) 4 mg/0.1 mL nasal spray  Self No No   Sig: Administer 1 spray into a nostril. If no response after 2-3 minutes, give another dose in the other nostril using a new spray.   Patient not taking: Reported on 8/29/2023   ondansetron (ZOFRAN) 8 mg tablet  Self No No   Sig: TAKE 1 TABLET (8 MG TOTAL) BY MOUTH EVERY 8 (EIGHT) HOURS AS NEEDED FOR NAUSEA OR VOMITING   Patient not taking: Reported on 8/29/2023   rosuvastatin (CRESTOR) 5 mg tablet  Self No No   Sig: TAKE 1 TABLET (5 MG TOTAL) BY MOUTH DAILY      Facility-Administered Medications: None       Past Medical History:   Diagnosis Date    Anemia     Anxiety     Hiatal hernia     Hypercholesterolemia     Uterine cancer (HCC)        Past Surgical History:   Procedure Laterality Date    COLONOSCOPY      complete     DILATION AND CURETTAGE OF UTERUS      x2     FL GUIDED CENTRAL VENOUS ACCESS DEVICE INSERTION  09/06/2022    HYSTERECTOMY      NJ INSJ TUNNELED CTR VAD W/SUBQ PORT AGE 5 YR/> N/A 09/06/2022    Procedure: INSERTION VENOUS PORT ( PORT-A-CATH) IR;  Surgeon: Josafat Hastings DO;  [facility];  Service: Interventional Radiology    NJ LAPS TOTAL HYSTERECT 250 GM/< W/RMVL TUBE/OVARY N/A 08/18/2022    Procedure: HYSTERECTOMY LAPAROSCOPIC TOTAL (LTH) W/ ROBOTICS, BILATERAL SALPINGO-OOPHORECTOMY, SENTINEL LYMPH NODE MAPPING AND BIOPSY;  Surgeon: Nehemiah Pillai MD;  [facility];  Service: Gynecology Oncology    NJ RMVL ANN-MARIE CTR VAD W/SUBQ PORT/ CTR/PRPH INSJ N/A 01/26/2023    Procedure: REMOVAL VENOUS PORT (PORT-A-CATH)IR;  Surgeon: Josafat Hastings DO;  [facility];  Service: Interventional Radiology       Family History   Problem Relation Age of Onset    No Known Problems Mother     Lung cancer Father      Brain cancer Father     No Known Problems Sister     No Known Problems Maternal Grandmother     No Known Problems Paternal Grandmother     No Known Problems Maternal Aunt     No Known Problems Maternal Aunt     No Known Problems Maternal Aunt     No Known Problems Maternal Aunt     No Known Problems Maternal Aunt     No Known Problems Maternal Aunt     No Known Problems Maternal Aunt     Breast cancer Cousin 28    Arthritis Family     Hypertension Family         essential      Hyperlipidemia Family     Endometrial cancer Neg Hx     Ovarian cancer Neg Hx     Colon cancer Neg Hx     Anesthesia problems Neg Hx      I have reviewed and agree with the history as documented.    E-Cigarette/Vaping    E-Cigarette Use Never User     Comments Denies any use per pts son      E-Cigarette/Vaping Substances    Nicotine No     THC No     CBD No     Flavoring No     Other No     Unknown No      Social History     Tobacco Use    Smoking status: Never    Smokeless tobacco: Never    Tobacco comments:     Never a smoker or use of any tobacco products per pts son    Vaping Use    Vaping status: Never Used   Substance Use Topics    Alcohol use: No     Comment: No use of alcohol at this time - former use only social use    Drug use: No     Comment: Denies any drug use per pts son       Review of Systems   Constitutional:  Negative for chills and fever.   HENT:  Negative for ear pain and sore throat.    Eyes:  Negative for pain and visual disturbance.   Respiratory:  Negative for cough and shortness of breath.    Cardiovascular:  Negative for chest pain and palpitations.   Gastrointestinal:  Negative for abdominal pain and vomiting.   Genitourinary:  Negative for dysuria and hematuria.   Musculoskeletal:  Positive for back pain. Negative for arthralgias.   Skin:  Negative for color change and rash.   Neurological:  Negative for seizures and syncope.   All other systems reviewed and are negative.      Physical Exam  Physical Exam  Vitals  and nursing note reviewed.   Constitutional:       General: She is not in acute distress.     Appearance: She is well-developed.   HENT:      Head: Normocephalic and atraumatic.      Right Ear: External ear normal.      Left Ear: External ear normal.   Eyes:      Conjunctiva/sclera: Conjunctivae normal.   Cardiovascular:      Rate and Rhythm: Normal rate and regular rhythm.      Heart sounds: No murmur heard.  Pulmonary:      Effort: Pulmonary effort is normal. No respiratory distress.      Breath sounds: Normal breath sounds.   Abdominal:      Palpations: Abdomen is soft.      Tenderness: There is no abdominal tenderness.   Musculoskeletal:         General: No swelling.      Cervical back: Neck supple.      Thoracic back: Tenderness present.      Lumbar back: Tenderness present.   Skin:     General: Skin is warm and dry.      Capillary Refill: Capillary refill takes less than 2 seconds.   Neurological:      Mental Status: She is alert.   Psychiatric:         Mood and Affect: Mood normal.         Vital Signs  ED Triage Vitals   Temperature Pulse Respirations Blood Pressure SpO2   08/30/24 1006 08/30/24 1006 08/30/24 1006 08/30/24 1006 08/30/24 1006   97.8 °F (36.6 °C) 102 18 152/67 97 %      Temp Source Heart Rate Source Patient Position - Orthostatic VS BP Location FiO2 (%)   08/30/24 1006 08/30/24 1006 -- 08/30/24 1006 --   Temporal Monitor  Right arm       Pain Score       08/30/24 1106       7           Vitals:    08/30/24 1006 08/30/24 1130 08/30/24 1300 08/30/24 1315   BP: 152/67 114/63  123/62   Pulse: 102 82 81          Visual Acuity      ED Medications  Medications   lidocaine (LIDODERM) 5 % patch 1 patch (1 patch Topical Medication Applied 8/30/24 1059)   dexamethasone (PF) (DECADRON) injection 10 mg (10 mg Intravenous Given 8/30/24 1105)   ketorolac (TORADOL) injection 15 mg (15 mg Intravenous Given 8/30/24 1106)   methocarbamol (ROBAXIN) tablet 500 mg (500 mg Oral Given 8/30/24 1059)   iohexol  (OMNIPAQUE) 350 MG/ML injection (MULTI-DOSE) 100 mL (100 mL Intravenous Given 8/30/24 1201)       Diagnostic Studies  Results Reviewed       Procedure Component Value Units Date/Time    Basic metabolic panel [[id number]] Collected: 08/30/24 1105    Lab Status: Final result Specimen: Blood from Arm, Right Updated: 08/30/24 1131     Sodium 138 mmol/L      Potassium 4.4 mmol/L      Chloride 104 mmol/L      CO2 26 mmol/L      ANION GAP 8 mmol/L      BUN 13 mg/dL      Creatinine 0.65 mg/dL      Glucose 92 mg/dL      Calcium 9.3 mg/dL      eGFR 89 ml/min/1.73sq m     Narrative:      National Kidney Disease Foundation guidelines for Chronic Kidney Disease (CKD):     Stage 1 with normal or high GFR (GFR > 90 mL/min/1.73 square meters)    Stage 2 Mild CKD (GFR = 60-89 mL/min/1.73 square meters)    Stage 3A Moderate CKD (GFR = 45-59 mL/min/1.73 square meters)    Stage 3B Moderate CKD (GFR = 30-44 mL/min/1.73 square meters)    Stage 4 Severe CKD (GFR = 15-29 mL/min/1.73 square meters)    Stage 5 End Stage CKD (GFR <15 mL/min/1.73 square meters)  Note: GFR calculation is accurate only with a steady state creatinine    CBC and differential [[id number]]  (Abnormal) Collected: 08/30/24 1105    Lab Status: Final result Specimen: Blood from Arm, Right Updated: 08/30/24 1118     WBC 7.84 Thousand/uL      RBC 4.76 Million/uL      Hemoglobin 13.1 g/dL      Hematocrit 42.1 %      MCV 88 fL      MCH 27.5 pg      MCHC 31.1 g/dL      RDW 13.2 %      MPV 8.9 fL      Platelets 297 Thousands/uL      nRBC 0 /100 WBCs      Segmented % 65 %      Immature Grans % 0 %      Lymphocytes % 25 %      Monocytes % 7 %      Eosinophils Relative 2 %      Basophils Relative 1 %      Absolute Neutrophils 5.04 Thousands/µL      Absolute Immature Grans 0.02 Thousand/uL      Absolute Lymphocytes 1.95 Thousands/µL      Absolute Monocytes 0.58 Thousand/µL      Eosinophils Absolute 0.19 Thousand/µL      Basophils Absolute 0.06 Thousands/µL                    CT  abdomen pelvis with contrast   Final Result by Rosa Amador MD (08/30 1309)   There is new central depression of the inferior endplate of the L1 vertebra with about 1520% loss of the vertebral height compatible with acute fracture likely osteoporotic      Transitional lumbosacral anatomy   No acute inflammatory stranding within the abdomen and pelvis   Hiatal hernia      Subcentimeter hypodensity within the upper pole of the right kidney measuring about 5 mm, stable attention at follow-up surveillance scan in 6 months to 1 year      The study was marked in EPIC for significant notification.      Workstation performed: [id number]         CT recon only lumbar spine   Final Result by Rosa Amador MD (08/30 1316)      Fracture through the inferior endplate of the L1 vertebra with central depression of the inferior endplate with about 20% loss of vertebral height.      Preserved posterior vertebral margin         Workstation performed: [id number]                    Procedures  Procedures         ED Course  ED Course as of 08/30/24 1429   Fri Aug 30, 2024   1329 CT recon only lumbar spine  Fracture through the inferior endplate of the L1 vertebra with central depression of the inferior endplate with about 20% loss of vertebral height.     Preserved posterior vertebral margin                                   SBIRT 22yo+      Flowsheet Row Most Recent Value   Initial Alcohol Screen: US AUDIT-C     1. How often do you have a drink containing alcohol? 0 Filed at: 08/30/2024 1007   2. How many drinks containing alcohol do you have on a typical day you are drinking?  0 Filed at: 08/30/2024 1007   3b. FEMALE Any Age, or MALE 65+: How often do you have 4 or more drinks on one occassion? 0 Filed at: 08/30/2024 1007   Audit-C Score 0 Filed at: 08/30/2024 1007   TESHA: How many times in the past year have you...    Used an illegal drug or used a prescription medication for non-medical reasons? Never Filed at: 08/30/2024  1007                      Medical Decision Making  This patient presents with back pain. No back pain red flags on history or physical.  Presentation not consistent with malignancy, cauda equina, osteomyelitis or epidural abscess.  Patient does not have any CVA tenderness to doubt renal colic, pyelonephritis.   CT abdomen and pelvis with recon of lumbar shows an L1 fracture.  Patient was placed in an LSO brace.  Patient given multimodal regimen for pain. Comprehensive spine referral given and advised to follow-up with physical therapy.  Return to ER symptoms worsens or questions or concerns arise at home.      Amount and/or Complexity of Data Reviewed  Labs: ordered.  Radiology: ordered. Decision-making details documented in ED Course.    Risk  Prescription drug management.                 Disposition  Final diagnoses:   L1 vertebral fracture (HCC)   Acute low back pain     Time reflects when diagnosis was documented in both MDM as applicable and the Disposition within this note       Time User Action Codes Description Comment    8/30/2024  1:30 PM Chani Coleman Add [S32.019A] L1 vertebral fracture (HCC)     8/30/2024  1:30 PM Chani Coleman Add [M54.50] Acute low back pain           ED Disposition       ED Disposition   Discharge    Condition   Stable    Date/Time   Fri Aug 30, 2024 1332    Comment   Heather José discharge to home/self care.                   Follow-up Information       Follow up With Specialties Details Why Contact Info Additional Information    Laurie Cheatham MD Family Medicine   [address]  [facility] [address]  [phone]       [facility] Physical Therapy   [phone]            Patient's Medications   Discharge Prescriptions    DEXAMETHASONE (DECADRON) 6 MG TABLET    Take 1 tablet (6 mg total) by mouth daily with breakfast for 5 days       Start Date: 8/30/2024 End Date: 9/4/2024       Order Dose: 6 mg       Quantity: 5 tablet     Refills: 0    LIDOCAINE (LIDODERM) 5 %    Apply 1 patch topically over 12 hours daily Remove & Discard patch within 12 hours or as directed by MD       Start Date: 8/30/2024 End Date: --       Order Dose: 1 patch       Quantity: 30 patch    Refills: 0    METHOCARBAMOL (ROBAXIN) 500 MG TABLET    Take 1 tablet (500 mg total) by mouth 4 (four) times a day       Start Date: 8/30/2024 End Date: --       Order Dose: 500 mg       Quantity: 20 tablet    Refills: 0    NAPROXEN (NAPROSYN) 500 MG TABLET    Take 1 tablet (500 mg total) by mouth 2 (two) times a day with meals       Start Date: 8/30/2024 End Date: --       Order Dose: 500 mg       Quantity: 30 tablet    Refills: 0           PDMP Review         Value Time User    PDMP Reviewed  Yes 9/1/2022  2:27 PM WENDY Barragan            ED Provider  Electronically Signed by             WENDY Feng  08/30/24 2381

## 2024-09-03 ENCOUNTER — TELEPHONE (OUTPATIENT)
Dept: PHYSICAL THERAPY | Facility: OTHER | Age: 69
End: 2024-09-03

## 2024-09-03 NOTE — TELEPHONE ENCOUNTER
Call placed to the patient per Comprehensive Spine Program referral.    Voice message left for patient to call back. Phone number and hours of business provided.     This is the 1st attempt to reach the patient.  Will defer per protocol.    Acute L1 fracture.

## 2024-09-20 ENCOUNTER — TELEPHONE (OUTPATIENT)
Dept: GYNECOLOGIC ONCOLOGY | Facility: CLINIC | Age: 69
End: 2024-09-20

## 2024-09-20 NOTE — TELEPHONE ENCOUNTER
Called patient to reschedule her appointment with provider on 10/22/24 due to provider being int he OR. Instructed patient to call the office back and provided office number.    Subjective   6/30: sitting up in chair. Reports right upper quadrant abdominal pain.   7/1: pt resting in bed. Reports right upper quadrant pain.  7/2: doing ok. No complaints  7/3: awake and smiling today. Reports better pain control     Objective   Joesph is a 76 year old male with h/o DM, HTN, hyperlipidemia, mild CKD with bcr 1.1-1.3 who presented on 6/26 for evaluation of progressively worsening shortness of breath, right abdominal pain radiating to the back and was found to have tachycardia, uncontrolled HTN, acute cholecystitis now s/p IR guided cholecystostomy tube placement now with IMTIAZ. Patient's baseline cr appears to be 1.1-1.3 for past 4 years and has been rising with current cr being 3.93. Patient was seen and examined. He reports right sided abdominal pain. He denies any changes in his urine output, dysuria or hematuria.     I/O's    Intake/Output Summary (Last 24 hours) at 7/3/2020 1024  Last data filed at 7/3/2020 0740  Gross per 24 hour   Intake 1465 ml   Output 1675 ml   Net -210 ml       Last Recorded Vitals  Blood pressure (!) 189/88, pulse 74, temperature 97.9 °F (36.6 °C), temperature source Oral, resp. rate 15, height 5' 9\" (1.753 m), weight 92.2 kg (203 lb 4.2 oz), SpO2 97 %.  Body mass index is 30.02 kg/m².    Physical Exam   Constitutional: He is oriented to person, place, and time. He appears well-developed and well-nourished.   HENT:   Head: Atraumatic.   Eyes: Conjunctivae are normal. Right eye exhibits no discharge.   Neck: Neck supple. No thyromegaly present.   Cardiovascular: Normal rate and regular rhythm.   Pulmonary/Chest: No respiratory distress. He has no wheezes. He has no rales.   Abdominal: Bowel sounds are normal. He exhibits no distension. There is abdominal tenderness.   Musculoskeletal:         General: No edema.   Neurological: He is alert and oriented to person, place, and time.   Skin: Skin is dry. No rash noted. He is not diaphoretic. No erythema.   Psychiatric: He  has a normal mood and affect. His behavior is normal. Judgment and thought content normal.        Medications  Current Facility-Administered Medications   Medication   • hydrALAZINE (APRESOLINE) tablet 50 mg   • dicyclomine (BENTYL) capsule 20 mg   • metoPROLOL succinate (TOPROL-XL) ER tablet 100 mg   • amLODIPine (NORVASC) tablet 10 mg   • heparin (porcine) injection 5,000 Units   • loperamide (IMODIUM) capsule 2 mg   • HYDROcodone-acetaminophen (NORCO)  MG per tablet 1 tablet   • sodium chloride 0.9 % flush bag 25 mL   • sodium chloride 0.9 % flush bag 25 mL   • acetaminophen (TYLENOL) tablet 650 mg   • dextrose 50 % injection 25 g   • dextrose 50 % injection 12.5 g   • glucagon (GLUCAGEN) injection 1 mg   • dextrose (GLUTOSE) 40 % gel 15 g   • dextrose (GLUTOSE) 40 % gel 15 g   • sodium chloride (PF) 0.9 % injection 10 mL   • ondansetron (ZOFRAN) injection 4 mg   • albuterol inhaler 2 puff   • guaiFENesin-DM) (ROBITUSSIN DM) 100-10 MG/5ML syrup 10 mL   • melatonin tablet 6 mg   • labetalol (NORMODYNE) injection 10 mg   • hydrALAZINE (APRESOLINE) injection 10 mg   • aspirin tablet 325 mg   • busPIRone (BUSPAR) tablet 15 mg   • ezetimibe-atorvastatin 10-40 MG   • levothyroxine (SYNTHROID, LEVOTHROID) tablet 200 mcg   • sitaGLIPtin (JANUVIA) tablet 50 mg   • venlafaxine XR (EFFEXOR XR) 24 hr capsule 300 mg   • venlafaxine XR (EFFEXOR XR) 24 hr capsule 450 mg   • insulin lispro (HumaLOG) scheduled dose AND correction dose   • repaglinide (PRANDIN) tablet 6 mg   • HYDROcodone-acetaminophen (NORCO) 5-325 MG per tablet 1 tablet   • traMADol (ULTRAM) tablet 50 mg      Medications Prior to Admission   Medication Sig Dispense Refill   • repaglinide (PRANDIN) 2 MG tablet Take 6 mg by mouth 2 times daily (before meals). Indications: Type 2 Diabetes      • venlafaxine XR (EFFEXOR XR) 150 MG 24 hr capsule Take 450 mg by mouth 4 days a week. Indications: Generalized Anxiety Disorder Takes on sun,sat, tue ,thur. Also takes  2 caps on mon,wed,fri     • levothyroxine 200 MCG tablet Take 200 mcg by mouth daily. Indications: Underactive Thyroid     • losartan (COZAAR) 100 MG tablet Take 100 mg by mouth daily. Indications: High Blood Pressure Disorder     • metoPROLOL succinate (TOPROL-XL) 50 MG 24 hr tablet Take 50 mg by mouth daily. Indications: High Blood Pressure Disorder     • ezetimibe-simvastatin (VYTORIN) 10-20 MG per tablet Take 1 tablet by mouth every morning.      • busPIRone (BUSPAR) 15 MG tablet Take 15 mg by mouth daily.     • venlafaxine XR (EFFEXOR XR) 150 MG 24 hr capsule Take 300 mg by mouth 3 days a week. Takes on mon,wed,fri. Also take 3 caps on sun,sat,tue,thur     • Multiple Vitamins-Minerals (CENTRUM SILVER PO) Take 1 tablet by mouth daily.     • Cyanocobalamin (VITAMIN B-12 PO) Take 1 tablet by mouth daily.     • Omega-3 Fatty Acids (FISH OIL PO) Take 1 capsule by mouth daily.     • Cholecalciferol (VITAMIN D-3 PO) Take 1 tablet by mouth daily.     • Ginkgo Biloba (GINKOBA PO) Take 1 tablet by mouth daily.     • sitaGLIPTIN-metFORMIN (JANUMET)  MG per tablet Take 1 tablet by mouth 2 times daily (with meals).     • amLODIPine (NORVASC) 10 MG tablet Take 10 mg by mouth daily.     • aspirin 325 MG tablet Take 325 mg by mouth daily.         Labs     Recent Labs   Lab 07/03/20  0454 07/02/20  0537 07/01/20  0455   WBC 10.5 9.2 9.0   RBC 3.65* 3.68* 3.69*   HGB 10.7* 10.7* 10.8*   HCT 33.4* 34.3* 34.3*    301 277       Recent Labs   Lab 07/03/20  0454 07/02/20  0537 07/01/20  0455 06/30/20  0440 06/29/20  0500   SODIUM 144 143 145 141 138   CHLORIDE 115* 117* 118* 110* 106   CO2 23 20* 18* 22 25   BUN 20 28* 47* 62* 54*   CREATININE 1.72* 2.03* 2.85* 4.08* 3.93*   CALCIUM 8.8 8.7 8.5 8.6 9.1   ALBUMIN  --   --  2.2* 2.1* 2.2*   BILIRUBIN  --   --  0.3 0.3 0.4   ALKPT  --   --  506* 458* 359*   GPT  --   --  111* 145* 181*   AST  --   --  54* 85* 112*   GLUCOSE 161* 174* 238* 171* 145*     Potassium (mmol/L)    Date Value   07/03/2020 3.7          • hydrALAZINE  50 mg Oral 3 times per day   • dicyclomine  20 mg Oral 4x Daily AC & HS   • metoPROLOL succinate  100 mg Oral Daily   • amLODIPine  10 mg Oral Daily   • heparin (porcine)  5,000 Units Subcutaneous 2 times per day   • sodium chloride (PF)  10 mL Intracatheter 2 times per day   • guaiFENesin-DM)  10 mL Oral TID   • aspirin  325 mg Oral Daily   • busPIRone  15 mg Oral Daily   • ezetimibe-atorvastatin 10-40 MG   Oral Daily   • levothyroxine  200 mcg Oral Daily   • sitaGLIPtin  50 mg Oral BID   • venlafaxine XR  300 mg Oral Once per day on Mon Wed Fri   • venlafaxine XR  450 mg Oral Once per day on Sun Tue Thu Sat   • insulin lispro   Subcutaneous 4x Daily AC & HS   • repaglinide  6 mg Oral BID AC       No results found.    Imaging      Assessment & Plan   76 year old male with h/o DM, HTN, hyperlipidemia, mild CKD with bcr 1.1-1.3 who presented on 6/26 for evaluation of progressively worsening shortness of breath, right abdominal pain radiating to the back and was found to have tachycardia, uncontrolled HTN, acute cholecystitis now s/p IR guided cholecystostomy tube placement now with IMTIAZ.      IMTIAZ / CKD  - CKD likely 2/2 DM, ischemic nephropathy  - baseline cr 1.1-1.3  - IMTIAZ now possibly from prerenal causes, hemodynamics, contrast nephropathy, NSAIDs  - UA bland with 0.9g proteinuria, likely 2/2 DM  - kidney US with no hydronephrosis  - Agree with holding NSAIDs, ARB  - cr continues to trend down and hoping for recovery back to baseline  - Will monitor Is and Os  - avoid hypotension and nephrotoxic medications     Acute cholecystitis  - Surgery following  - s/p cholecystostomy tube placement     Pneumonia  - abx per Dr. Morales     HTN  - BP high   - continue to hold ARB  - Added Amlodipine, increased metoprolol  - BP still uncontrolled  - hydralazine has been added     Anemia  - Hgb stable     DM  - management per Dr. Morales    Dispo - ok to d/c home from  Nephrology standpoint if BP better later today. Recommend follow up as outpatient in 1-2 weeks     Discussed with Dr. Morales  D/w pt. All questions answered     Thank you. Will continue to follow with you    Smoking Cessation  Counseling given: Not Answered

## 2024-09-26 ENCOUNTER — TELEPHONE (OUTPATIENT)
Dept: GYNECOLOGIC ONCOLOGY | Facility: CLINIC | Age: 69
End: 2024-09-26

## 2024-09-26 NOTE — TELEPHONE ENCOUNTER
Called patient to reschedule her appointment with provider on 10/22/24 due to provider being int he OR. Instructed patient to call the office back and provided office number.

## 2024-09-30 ENCOUNTER — TELEPHONE (OUTPATIENT)
Dept: HEMATOLOGY ONCOLOGY | Facility: CLINIC | Age: 69
End: 2024-09-30

## 2024-09-30 ENCOUNTER — TELEPHONE (OUTPATIENT)
Dept: GYNECOLOGIC ONCOLOGY | Facility: CLINIC | Age: 69
End: 2024-09-30

## 2024-09-30 ENCOUNTER — TELEPHONE (OUTPATIENT)
Dept: FAMILY MEDICINE CLINIC | Facility: CLINIC | Age: 69
End: 2024-09-30

## 2024-09-30 NOTE — TELEPHONE ENCOUNTER
Called to inform patient that we need to reschedule her appointment on 10/22/24 with provider due to provider going to the OR. Instructed patient I have canceled her appointment and she needs to call the office to reschedule.

## 2024-10-01 ENCOUNTER — TELEPHONE (OUTPATIENT)
Dept: FAMILY MEDICINE CLINIC | Facility: CLINIC | Age: 69
End: 2024-10-01

## 2024-10-01 NOTE — TELEPHONE ENCOUNTER
Pt rec/ed a message from Dr Cheatham stating that she should call and schedule her AWV before end of year.  There are no available spots for this appt through Dec.. unless it could be a 20 minute appt.  Please advise. Pt of what  recommends.

## 2024-10-01 NOTE — TELEPHONE ENCOUNTER
Pt returned call.. There are no available appt's for the AWV by end of yesr.. Message was sent to Dr Cheatham to see what she recommends.

## 2024-10-10 ENCOUNTER — HOSPITAL ENCOUNTER (OUTPATIENT)
Dept: RADIOLOGY | Age: 69
Discharge: HOME | End: 2024-10-10
Payer: MEDICARE

## 2024-10-10 DIAGNOSIS — C57.7 MALIGNANT NEOPLASM OF OTHER SPECIFIED FEMALE GENITAL ORGANS (HCC): ICD-10-CM

## 2024-10-10 DIAGNOSIS — C55 UTERINE CARCINOSARCOMA (HCC): ICD-10-CM

## 2024-10-10 LAB — GLUCOSE SERPL-MCNC: 98 MG/DL (ref 65–140)

## 2024-10-10 PROCEDURE — 78815 PET IMAGE W/CT SKULL-THIGH: CPT

## 2024-10-10 PROCEDURE — 82948 REAGENT STRIP/BLOOD GLUCOSE: CPT

## 2024-10-10 PROCEDURE — A9552 F18 FDG: HCPCS

## 2024-10-29 ENCOUNTER — OFFICE VISIT (OUTPATIENT)
Dept: GYNECOLOGIC ONCOLOGY | Facility: CLINIC | Age: 69
End: 2024-10-29
Payer: MEDICARE

## 2024-10-29 VITALS
SYSTOLIC BLOOD PRESSURE: 128 MMHG | RESPIRATION RATE: 16 BRPM | TEMPERATURE: 97.9 F | DIASTOLIC BLOOD PRESSURE: 80 MMHG | HEIGHT: 60 IN | WEIGHT: 139 LBS | HEART RATE: 66 BPM | BODY MASS INDEX: 27.29 KG/M2 | OXYGEN SATURATION: 96 %

## 2024-10-29 DIAGNOSIS — C54.1 MALIGNANT NEOPLASM OF ENDOMETRIUM (HCC): Primary | ICD-10-CM

## 2024-10-29 DIAGNOSIS — C55 UTERINE CARCINOSARCOMA (HCC): ICD-10-CM

## 2024-10-29 DIAGNOSIS — M80.00XA AGE-RELATED OSTEOPOROSIS WITH CURRENT PATHOLOGICAL FRACTURE, INITIAL ENCOUNTER: ICD-10-CM

## 2024-10-29 PROCEDURE — 99214 OFFICE O/P EST MOD 30 MIN: CPT | Performed by: OBSTETRICS & GYNECOLOGY

## 2024-10-29 RX ORDER — ALENDRONATE SODIUM 70 MG/1
70 TABLET ORAL
Qty: 12 TABLET | Refills: 4 | Status: SHIPPED | OUTPATIENT
Start: 2024-10-29

## 2024-10-29 NOTE — PROGRESS NOTES
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  Patient is currently in remission from her stage Ia carcinosarcoma of the endometrium.  Recommend follow-up PET CT scan in another 3 months.         Age-related osteoporosis with current pathological fracture     Patient has known osteoporosis on DEXA scan.  She now has a fracture.  She has previously been taking calcium and vitamin D and has refused bisphosphonates.  Patient is now interested in bisphosphonates.  We have discussed risks and benefits of alendronate and prescribed 70 mg p.o. once per week.  Repeat DEXA scan will be performed in 2 years.         Relevant Medications    alendronate (FOSAMAX) 70 mg tablet         CHIEF COMPLAINT: Surveillance endometrial cancer      Problem:  Cancer Staging   Uterine carcinosarcoma (HCC)  Staging form: Corpus Uteri - Carcinoma, AJCC 8th Edition  - Clinical stage from 8/26/2022: FIGO Stage IA (cT1a, cN0, cM0) - Signed by Nehemiah Pillai MD on 8/26/2022        Previous therapy:  Oncology History   Uterine carcinosarcoma (HCC)   7/20/2022 Biopsy    Endometrium, EMB:  - Markedly atypical glandular and stromal elements, suspicious for carcinosarcoma.  See comment.     Comment: There are superficial strips of atypical glandular elements which are diffusely positive for p53 by immunohistochemistry.  Additionally, the the stromal elements demonstrate significant nuclear pleomorphism.  The overall findings are suspicious for carcinosarcoma.     8/18/2022 Initial Diagnosis    Uterine carcinosarcoma (HCC)     8/26/2022 -  Cancer Staged    Staging form: Corpus Uteri - Carcinoma, AJCC 8th Edition  - Clinical stage from 8/26/2022: FIGO Stage IA (cT1a, cN0, cM0) - Signed by Nehemiah Pillai MD on  8/26/2022  Histopathologic type: Carcinosarcoma  Stage prefix: Initial diagnosis  Histologic grade (G): G3  Histologic grading system: 3 grade system       8/2022 Surgery    Patient and did went robotic hysterectomy bilateral salpingo-oophorectomy sentinel lymph node mapping and biopsy for stage I A non invasive carcinosarcoma of the endometrium.  The uterus did fracture within the vagina but no leakage into the abdomen was noted.     9/19/2022 - 1/4/2023 Chemotherapy    palonosetron (ALOXI), 0.25 mg, Intravenous, Once, 6 of 6 cycles  Administration: 0.25 mg (9/19/2022), 0.25 mg (10/10/2022), 0.25 mg (10/31/2022), 0.25 mg (11/21/2022), 0.25 mg (12/12/2022), 0.25 mg (1/3/2023)  pegfilgrastim (NEULASTA), 6 mg, Subcutaneous, Once, 5 of 5 cycles  Administration: 6 mg (10/11/2022), 6 mg (11/1/2022), 6 mg (11/22/2022), 6 mg (12/13/2022), 6 mg (1/4/2023)  fosaprepitant (EMEND) IVPB, 150 mg, Intravenous, Once, 6 of 6 cycles  Administration: 150 mg (9/19/2022), 150 mg (10/10/2022), 150 mg (10/31/2022), 150 mg (11/21/2022), 150 mg (12/12/2022), 150 mg (1/3/2023)  CARBOplatin (PARAPLATIN) IVPB (GOG AUC DOSING), 572.4 mg, Intravenous, Once, 6 of 6 cycles  Administration: 572.4 mg (9/19/2022), 579.6 mg (10/10/2022), 579.6 mg (10/31/2022), 575.4 mg (11/21/2022), 564 mg (12/12/2022), 564 mg (1/3/2023)  PACLItaxel (TAXOL) chemo IVPB, 175 mg/m2 = 274.8 mg, Intravenous, Once, 6 of 6 cycles  Administration: 274.8 mg (9/19/2022), 278.4 mg (10/10/2022), 278.4 mg (10/31/2022), 276.6 mg (11/21/2022), 273 mg (12/12/2022), 273 mg (1/3/2023)     2/1/2023 - 2/10/2023 Radiation    Treatments:  Course: C1 HDR 2.5cmCyl  Plan ID Energy Source Fractions Dose per Fraction (cGy) Rx Dose Total Dose Delivered (cGy) Elapsed Days   HDR2.5cm cylinder diameter  Ir-192 3 / 3 700 5mm from cylinder surface 2,100 9    Treatment Dates:  2/1/2023, 2/6/2023, 2/10/2023.            Patient ID: Heather José is a 69 y.o. female  Patient is a very pleasant  69-year-old female with a history of stage Ia uterine carcinosarcoma status post robot-assisted total laparoscopic hysterectomy bilateral salpingo-oophorectomy sentinel lymph node mapping and biopsy in the summer 2022.  Patient subsequently underwent 6 cycles of carboplatin and paclitaxel therapy and vaginal brachytherapy.  She has been disease-free since that time.    Patient presents today in follow-up.  She does have sore ribs after recent fall but is otherwise without complaint.    Today, the patient is doing well.  She denies significant abdominal pain, pelvic pain, nausea, vomiting, constipation, diarrhea, fevers, chills, or vaginal bleeding.     Most recent PET CT scan performed this month reveals the following:  IMPRESSION:     1. No findings suspicious for FDG avid recurrent or metastatic disease  2. Mild activity in the associated with the known L1 compression fracture, and subacute fractures of the anterior left third, fourth, fifth and sixth ribs       is slightly higher at 17        The following portions of the patient's history were reviewed and updated as appropriate: allergies, current medications, past family history, past medical history, past social history, past surgical history, and problem list.    Review of Systems   Constitutional: Negative.    HENT: Negative.     Eyes: Negative.    Respiratory: Negative.     Cardiovascular: Negative.    Gastrointestinal: Negative.    Endocrine: Negative.    Genitourinary: Negative.    Musculoskeletal: Negative.    Skin: Negative.    Neurological: Negative.    Hematological: Negative.    Psychiatric/Behavioral: Negative.         Current Outpatient Medications   Medication Sig Dispense Refill    alendronate (FOSAMAX) 70 mg tablet Take 1 tablet (70 mg total) by mouth every 7 days 12 tablet 4    cholecalciferol (VITAMIN D3) 1,000 units tablet Take 1,000 Units by mouth daily      cyanocobalamin (VITAMIN B-12) 100 MCG tablet Take 100 mcg by mouth daily       escitalopram (LEXAPRO) 10 mg tablet TAKE 1 TABLET (10 MG TOTAL) BY MOUTH DAILY 90 tablet 3    lidocaine (Lidoderm) 5 % Apply 1 patch topically over 12 hours daily Remove & Discard patch within 12 hours or as directed by MD 30 patch 0    loratadine-pseudoephedrine (CLARITIN-D 24-HOUR)  mg per 24 hr tablet Take 1 tablet by mouth daily      LORazepam (ATIVAN) 1 mg tablet Take 1 tablet (1 mg total) by mouth every 6 (six) hours as needed for anxiety (or nausea) 36 tablet 1    methocarbamol (ROBAXIN) 500 mg tablet Take 1 tablet (500 mg total) by mouth 4 (four) times a day 20 tablet 0    Multiple Vitamins-Minerals (multivitamin with minerals) tablet Take 1 tablet by mouth daily      Multiple Vitamins-Minerals (OCUVITE PO) Take by mouth      naproxen (Naprosyn) 500 mg tablet Take 1 tablet (500 mg total) by mouth 2 (two) times a day with meals 30 tablet 0    rosuvastatin (CRESTOR) 5 mg tablet TAKE 1 TABLET (5 MG TOTAL) BY MOUTH DAILY 90 tablet 3    naloxone (NARCAN) 4 mg/0.1 mL nasal spray Administer 1 spray into a nostril. If no response after 2-3 minutes, give another dose in the other nostril using a new spray. (Patient not taking: Reported on 8/29/2023) 1 each 1    ondansetron (ZOFRAN) 8 mg tablet TAKE 1 TABLET (8 MG TOTAL) BY MOUTH EVERY 8 (EIGHT) HOURS AS NEEDED FOR NAUSEA OR VOMITING (Patient not taking: Reported on 8/29/2023) 30 tablet 1     No current facility-administered medications for this visit.           Objective:    Blood pressure 128/80, pulse 66, temperature 97.9 °F (36.6 °C), temperature source Tympanic, resp. rate 16, height 5' (1.524 m), weight 63 kg (139 lb), SpO2 96%, not currently breastfeeding.  Body mass index is 27.15 kg/m².  Body surface area is 1.6 meters squared.    Physical Exam  Constitutional:       Appearance: She is well-developed.   HENT:      Head: Normocephalic and atraumatic.   Neck:      Thyroid: No thyromegaly.   Cardiovascular:      Rate and Rhythm: Normal rate and regular  rhythm.      Heart sounds: Normal heart sounds.   Pulmonary:      Effort: Pulmonary effort is normal.      Breath sounds: Normal breath sounds.   Abdominal:      General: Bowel sounds are normal.      Palpations: Abdomen is soft.      Comments: Well healed laparoscopic incisions.   Genitourinary:     Comments: -Normal external female genitalia, normal Bartholin's and [place]'s glands                  -Normal midline urethral meatus. No lesions notes                  -Bladder without fullness mass or tenderness                  -Vagina without lesion or discharge No significant cystocele or rectocele noted                  -Cervix surgically absent                  -Uterus surgically absent                  -Adnexae surgically absent                  - Anus without fissure of lesion    Musculoskeletal:         General: Normal range of motion.      Cervical back: Normal range of motion and neck supple.   Lymphadenopathy:      Cervical: No cervical adenopathy.   Skin:     General: Skin is warm and dry.   Neurological:      Mental Status: She is alert and oriented to person, place, and time.   Psychiatric:         Behavior: Behavior normal.         Lab Results   Component Value Date     17.9 07/06/2024     Lab Results   Component Value Date    K 4.4 08/30/2024     08/30/2024    CO2 26 08/30/2024    BUN 13 08/30/2024    CREATININE 0.65 08/30/2024    GLUF 90 08/08/2023    CALCIUM 9.3 08/30/2024    CORRECTEDCA 9.1 11/23/2022    AST 17 08/08/2023    ALT 17 08/08/2023    ALKPHOS 74 08/08/2023    EGFR 89 08/30/2024     Lab Results   Component Value Date    WBC 7.84 08/30/2024    HGB 13.1 08/30/2024    HCT 42.1 08/30/2024    MCV 88 08/30/2024     08/30/2024     Lab Results   Component Value Date    NEUTROABS 5.04 08/30/2024        Trend:  Lab Results   Component Value Date     17.9 07/06/2024     15.1 08/08/2023     13.8 01/19/2023     15.2 12/29/2022     13.1 12/08/2022      37.3 (H) 11/17/2022     45.5 (H) 10/27/2022     67.6 (H) 09/29/2022     85.4 (H) 09/22/2022     84.9 (H) 09/16/2022

## 2024-10-29 NOTE — ASSESSMENT & PLAN NOTE
Patient has known osteoporosis on DEXA scan.  She now has a fracture.  She has previously been taking calcium and vitamin D and has refused bisphosphonates.  Patient is now interested in bisphosphonates.  We have discussed risks and benefits of alendronate and prescribed 70 mg p.o. once per week.  Repeat DEXA scan will be performed in 2 years.

## 2024-11-12 NOTE — PROGRESS NOTES
Pt to clinic for neulasta sq injection that was administered on the left arm  Pt offers no complaints today  Tolerated sq inj without complications  AVS was offered, pt declined  Pt ambulated out of clinic safely 
Quality 130: Documentation Of Current Medications In The Medical Record: Current Medications Documented
Quality 47: Advance Care Plan: Advance Care Planning discussed and documented; advance care plan or surrogate decision maker documented in the medical record.
Quality 226: Preventive Care And Screening: Tobacco Use: Screening And Cessation Intervention: Patient screened for tobacco use and is an ex/non-smoker
Quality 431: Preventive Care And Screening: Unhealthy Alcohol Use - Screening: Patient not identified as an unhealthy alcohol user when screened for unhealthy alcohol use using a systematic screening method
Detail Level: Detailed

## 2024-11-13 ENCOUNTER — TELEPHONE (OUTPATIENT)
Dept: GYNECOLOGIC ONCOLOGY | Facility: CLINIC | Age: 69
End: 2024-11-13

## 2024-11-13 NOTE — TELEPHONE ENCOUNTER
Called to inform patient we need to reschedule her appointment in February with provider due to him being [facility]. Instructed patient to call the office back to reschedule.

## 2024-12-05 ENCOUNTER — OFFICE VISIT (OUTPATIENT)
Dept: FAMILY MEDICINE CLINIC | Facility: CLINIC | Age: 69
End: 2024-12-05
Payer: MEDICARE

## 2024-12-05 VITALS
HEIGHT: 60 IN | TEMPERATURE: 98.4 F | WEIGHT: 134.7 LBS | DIASTOLIC BLOOD PRESSURE: 72 MMHG | SYSTOLIC BLOOD PRESSURE: 118 MMHG | HEART RATE: 62 BPM | BODY MASS INDEX: 26.45 KG/M2 | OXYGEN SATURATION: 99 %

## 2024-12-05 DIAGNOSIS — F41.9 ANXIETY: ICD-10-CM

## 2024-12-05 DIAGNOSIS — Z12.31 SCREENING MAMMOGRAM, ENCOUNTER FOR: ICD-10-CM

## 2024-12-05 DIAGNOSIS — Z12.11 SCREEN FOR COLON CANCER: ICD-10-CM

## 2024-12-05 DIAGNOSIS — H61.23 BILATERAL IMPACTED CERUMEN: ICD-10-CM

## 2024-12-05 DIAGNOSIS — E78.2 HYPERLIPIDEMIA, MIXED: ICD-10-CM

## 2024-12-05 DIAGNOSIS — Z00.00 MEDICARE ANNUAL WELLNESS VISIT, SUBSEQUENT: Primary | ICD-10-CM

## 2024-12-05 PROCEDURE — 69210 REMOVE IMPACTED EAR WAX UNI: CPT | Performed by: FAMILY MEDICINE

## 2024-12-05 PROCEDURE — G0439 PPPS, SUBSEQ VISIT: HCPCS | Performed by: FAMILY MEDICINE

## 2024-12-05 RX ORDER — ESCITALOPRAM OXALATE 10 MG/1
10 TABLET ORAL DAILY
Qty: 90 TABLET | Refills: 3 | Status: SHIPPED | OUTPATIENT
Start: 2024-12-05

## 2024-12-05 NOTE — PROGRESS NOTES
Name: Heather José      : 1955      MRN: [id number]  Encounter Provider: Laurie Cheatham MD  Encounter Date: 2024   Encounter department: [facility]    Assessment & Plan  Medicare annual wellness visit, subsequent         Hyperlipidemia, mixed  Continue Crestor and update labs  Orders:    Lipid Panel with Direct LDL reflex; Future    Screen for colon cancer    Orders:    Cologuard    Screening mammogram, encounter for    Orders:    Mammo screening bilateral w 3d and cad; Future    Bilateral impacted cerumen  Cerumen cleared today       Anxiety  Doing well on Lexapro  Orders:    escitalopram (LEXAPRO) 10 mg tablet; Take 1 tablet (10 mg total) by mouth daily       Preventive health issues were discussed with patient, and age appropriate screening tests were ordered as noted in patient's After Visit Summary. Personalized health advice and appropriate referrals for health education or preventive services given if needed, as noted in patient's After Visit Summary.    History of Present Illness     No acute concerns  Due for labs and some screenings  Follows with Dr. Pillai for her history of endometrial cancer.       Patient Care Team:  Laurie Cheatham MD as PCP - General  WENDY Pineda, MSW as  Care Manager (Oncology)    Review of Systems   Constitutional:  Negative for activity change.   Respiratory:  Negative for chest tightness and shortness of breath.    Cardiovascular:  Negative for chest pain and leg swelling.   Neurological:  Positive for numbness (neuropathy in hands and feet). Negative for headaches.   Psychiatric/Behavioral:  Negative for dysphoric mood. The patient is not nervous/anxious.      Medical History Reviewed by provider this encounter:       Annual Wellness Visit Questionnaire   Heather is here for her Subsequent Wellness visit. Last Medicare Wellness visit information reviewed, patient interviewed and updates  made to the record today.      Health Risk Assessment:   Patient rates overall health as excellent. Patient feels that their physical health rating is much better. Patient is very satisfied with their life. Eyesight was rated as same. Hearing was rated as same. Patient feels that their emotional and mental health rating is much better. Patients states they are never, rarely angry. Patient states they are never, rarely unusually tired/fatigued. Pain experienced in the last 7 days has been none. Patient states that she has experienced no weight loss or gain in last 6 months.     Depression Screening:   PHQ-2 Score: 0      Fall Risk Screening:   In the past year, patient has experienced: no history of falling in past year      Urinary Incontinence Screening:   Patient has leaked urine accidently in the last six months. occasional leak    Home Safety:  Patient does not have trouble with stairs inside or outside of their home. Patient has working smoke alarms and has working carbon monoxide detector. Home safety hazards include: none.     Nutrition:   Current diet is Regular.     Medications:   Patient is not currently taking any over-the-counter supplements. Patient is able to manage medications.     Activities of Daily Living (ADLs)/Instrumental Activities of Daily Living (IADLs):   Walk and transfer into and out of bed and chair?: Yes  Dress and groom yourself?: Yes    Bathe or shower yourself?: Yes    Feed yourself? Yes  Do your laundry/housekeeping?: Yes  Manage your money, pay your bills and track your expenses?: Yes  Make your own meals?: Yes    Do your own shopping?: Yes    Previous Hospitalizations:   Any hospitalizations or ED visits within the last 12 months?: Yes    How many hospitalizations have you had in the last year?: 1-2    Advance Care Planning:   Living will: No    Durable POA for healthcare: No    Advanced directive: No      Cognitive Screening:   Provider or family/friend/caregiver concerned  regarding cognition?: No    PREVENTIVE SCREENINGS      Cardiovascular Screening:    General: Screening Not Indicated, History Lipid Disorder and Risks and Benefits Discussed    Due for: Lipid Panel      Diabetes Screening:     General: Screening Current and Risks and Benefits Discussed    Due for: Blood Glucose      Colorectal Cancer Screening:     General: Risks and Benefits Discussed      Breast Cancer Screening:     General: Screening Current and Risks and Benefits Discussed      Cervical Cancer Screening:    General: Screening Not Indicated      Osteoporosis Screening:    General: Screening Not Indicated and History Osteoporosis      Abdominal Aortic Aneurysm (AAA) Screening:        General: Screening Not Indicated      Lung Cancer Screening:     General: Screening Not Indicated      Hepatitis C Screening:    General: Screening Current    Screening, Brief Intervention, and Referral to Treatment (SBIRT)    Screening  Typical number of drinks in a day: 0  Typical number of drinks in a week: 0  Interpretation: Low risk drinking behavior.    AUDIT-C Screenin) How often did you have a drink containing alcohol in the past year? never  2) How many drinks did you have on a typical day when you were drinking in the past year? 0  3) How often did you have 6 or more drinks on one occasion in the past year? never    AUDIT-C Score: 0  Interpretation: Score 0-2 (female): Negative screen for alcohol misuse    Single Item Drug Screening:  How often have you used an illegal drug (including marijuana) or a prescription medication for non-medical reasons in the past year? never    Single Item Drug Screen Score: 0  Interpretation: Negative screen for possible drug use disorder    Social Drivers of Health     Financial Resource Strain: Patient Declined (8/10/2023)    Overall Financial Resource Strain (CARDIA)     Difficulty of Paying Living Expenses: Patient declined   Food Insecurity: No Food Insecurity (2024)    Hunger  Vital Sign     Worried About Running Out of Food in the Last Year: Never true     Ran Out of Food in the Last Year: Never true   Transportation Needs: No Transportation Needs (12/5/2024)    PRAPARE - Transportation     Lack of Transportation (Medical): No     Lack of Transportation (Non-Medical): No   Housing Stability: Low Risk  (12/5/2024)    Housing Stability Vital Sign     Unable to Pay for Housing in the Last Year: No     Number of Times Moved in the Last Year: 1     Homeless in the Last Year: No   Utilities: Not At Risk (12/5/2024)    [facility] Utilities     Threatened with loss of utilities: No     No results found.    Objective   /72   Pulse 62   Temp 98.4 °F (36.9 °C)   Ht 5' (1.524 m)   Wt 61.1 kg (134 lb 11.2 oz)   SpO2 99%   BMI 26.31 kg/m²     Physical Exam  Vitals and nursing note reviewed.   Constitutional:       General: She is not in acute distress.     Appearance: She is well-developed. She is not diaphoretic.   HENT:      Head: Normocephalic and atraumatic.      Right Ear: External ear normal. There is impacted cerumen.      Left Ear: External ear normal. There is impacted cerumen.   Eyes:      Conjunctiva/sclera: Conjunctivae normal.   Cardiovascular:      Rate and Rhythm: Normal rate and regular rhythm.      Heart sounds: Normal heart sounds. No murmur heard.     No friction rub. No gallop.   Pulmonary:      Effort: Pulmonary effort is normal. No respiratory distress.      Breath sounds: Normal breath sounds. No stridor. No wheezing or rales.   Chest:      Chest wall: No tenderness.   Musculoskeletal:      Right lower leg: No edema.      Left lower leg: No edema.   Neurological:      General: No focal deficit present.      Mental Status: She is alert.   Psychiatric:         Mood and Affect: Mood normal.         Behavior: Behavior normal.         Thought Content: Thought content normal.         Judgment: Judgment normal.     Ear cerumen removal    Date/Time: 12/5/2024 12:40 PM    Performed  by: Laurie Cheatham MD  Authorized by: Laurie Cheatham MD  Universal Protocol:  procedure performed by consultantConsent: Verbal consent obtained.  Risks and benefits: risks, benefits and alternatives were discussed  Consent given by: patient  Timeout called at: 12/5/2024 1:15 AM.  Patient understanding: patient states understanding of the procedure being performed    Patient location:  Clinic  Procedure details:     Location:  R ear and L ear    Procedure type: irrigation with instrumentation      Instrumentation: curette      Approach:  External  Post-procedure details:     Complication:  None    Hearing quality:  Improved    Patient tolerance of procedure:  Tolerated well, no immediate complications

## 2024-12-05 NOTE — ASSESSMENT & PLAN NOTE
Doing well on Lexapro  Orders:    escitalopram (LEXAPRO) 10 mg tablet; Take 1 tablet (10 mg total) by mouth daily

## 2024-12-06 DIAGNOSIS — E78.2 HYPERLIPIDEMIA, MIXED: ICD-10-CM

## 2024-12-09 RX ORDER — ROSUVASTATIN CALCIUM 5 MG/1
5 TABLET, COATED ORAL DAILY
Qty: 30 TABLET | Refills: 0 | Status: SHIPPED | OUTPATIENT
Start: 2024-12-09

## 2024-12-09 NOTE — TELEPHONE ENCOUNTER
Patient needs updated blood work and has previously placed orders. Please contact patient to go for labs. Courtesy refill provided.    Lipid Panel needed

## 2024-12-13 ENCOUNTER — TELEPHONE (OUTPATIENT)
Dept: GYNECOLOGIC ONCOLOGY | Facility: CLINIC | Age: 69
End: 2024-12-13

## 2024-12-13 NOTE — TELEPHONE ENCOUNTER
I left a message for the patient to call the office to reschedule the appointment with Dr. Pillai on 2/11/2024 at 8:45AM due to the provider being out of the office.

## 2025-01-03 DIAGNOSIS — E78.2 HYPERLIPIDEMIA, MIXED: ICD-10-CM

## 2025-01-03 RX ORDER — ROSUVASTATIN CALCIUM 5 MG/1
TABLET, COATED ORAL
Qty: 30 TABLET | Refills: 0 | Status: SHIPPED | OUTPATIENT
Start: 2025-01-03

## 2025-01-16 ENCOUNTER — APPOINTMENT (OUTPATIENT)
Dept: LAB | Facility: CLINIC | Age: 70
End: 2025-01-16
Payer: MEDICARE

## 2025-01-16 ENCOUNTER — TELEPHONE (OUTPATIENT)
Dept: FAMILY MEDICINE CLINIC | Facility: CLINIC | Age: 70
End: 2025-01-16

## 2025-01-16 DIAGNOSIS — J06.9 UPPER RESPIRATORY TRACT INFECTION, UNSPECIFIED TYPE: Primary | ICD-10-CM

## 2025-01-16 DIAGNOSIS — E78.2 HYPERLIPIDEMIA, MIXED: ICD-10-CM

## 2025-01-16 PROCEDURE — 80061 LIPID PANEL: CPT

## 2025-01-16 PROCEDURE — 36415 COLL VENOUS BLD VENIPUNCTURE: CPT

## 2025-01-16 RX ORDER — AZITHROMYCIN 250 MG/1
TABLET, FILM COATED ORAL
Qty: 6 TABLET | Refills: 0 | Status: SHIPPED | OUTPATIENT
Start: 2025-01-16 | End: 2025-01-21

## 2025-01-16 NOTE — TELEPHONE ENCOUNTER
Pt stops in asking if her zithromax can be filled to have on hand. Please erx to [place]. 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   Call pt

## 2025-01-17 ENCOUNTER — RESULTS FOLLOW-UP (OUTPATIENT)
Dept: FAMILY MEDICINE CLINIC | Facility: CLINIC | Age: 70
End: 2025-01-17

## 2025-01-17 LAB
CHOLEST SERPL-MCNC: 123 MG/DL (ref ?–200)
COLOGUARD RESULT REPORTABLE: NEGATIVE
HDLC SERPL-MCNC: 57 MG/DL
LDLC SERPL CALC-MCNC: 49 MG/DL (ref 0–100)
TRIGL SERPL-MCNC: 84 MG/DL (ref ?–150)

## 2025-01-28 ENCOUNTER — HOSPITAL ENCOUNTER (OUTPATIENT)
Dept: RADIOLOGY | Age: 70
Discharge: HOME | End: 2025-01-28
Payer: MEDICARE

## 2025-01-28 DIAGNOSIS — C54.1 MALIGNANT NEOPLASM OF ENDOMETRIUM (HCC): ICD-10-CM

## 2025-01-28 LAB — GLUCOSE SERPL-MCNC: 84 MG/DL (ref 65–140)

## 2025-01-28 PROCEDURE — 82948 REAGENT STRIP/BLOOD GLUCOSE: CPT

## 2025-01-28 PROCEDURE — A9552 F18 FDG: HCPCS

## 2025-01-28 PROCEDURE — 78815 PET IMAGE W/CT SKULL-THIGH: CPT

## 2025-02-25 ENCOUNTER — OFFICE VISIT (OUTPATIENT)
Dept: GYNECOLOGIC ONCOLOGY | Facility: CLINIC | Age: 70
End: 2025-02-25
Payer: MEDICARE

## 2025-02-25 ENCOUNTER — APPOINTMENT (OUTPATIENT)
Dept: LAB | Facility: HOSPITAL | Age: 70
End: 2025-02-25
Payer: MEDICARE

## 2025-02-25 VITALS
SYSTOLIC BLOOD PRESSURE: 120 MMHG | BODY MASS INDEX: 26.39 KG/M2 | HEIGHT: 60 IN | TEMPERATURE: 97.4 F | WEIGHT: 134.4 LBS | DIASTOLIC BLOOD PRESSURE: 74 MMHG | OXYGEN SATURATION: 97 % | HEART RATE: 84 BPM

## 2025-02-25 DIAGNOSIS — C55 UTERINE CARCINOSARCOMA (HCC): Primary | ICD-10-CM

## 2025-02-25 DIAGNOSIS — C57.7 MALIGNANT NEOPLASM OF OTHER SPECIFIED FEMALE GENITAL ORGANS (HCC): ICD-10-CM

## 2025-02-25 DIAGNOSIS — C54.1 MALIGNANT NEOPLASM OF ENDOMETRIUM (HCC): ICD-10-CM

## 2025-02-25 LAB — CANCER AG125 SERPL-ACNC: 17.2 U/ML (ref 0–35)

## 2025-02-25 PROCEDURE — 99213 OFFICE O/P EST LOW 20 MIN: CPT | Performed by: OBSTETRICS & GYNECOLOGY

## 2025-02-25 PROCEDURE — 36415 COLL VENOUS BLD VENIPUNCTURE: CPT

## 2025-02-25 PROCEDURE — 86304 IMMUNOASSAY TUMOR CA 125: CPT

## 2025-02-25 NOTE — ASSESSMENT & PLAN NOTE
Patient is a very pleasant 70-year-old female with a history of stage Ia carcinosarcoma of the uterus now without evidence of recurrence of disease.  She continues stable and will follow-up in 6 months for repeat evaluation and survivorship visit.Patient's neuropathy continues to improve.  She is overall doing well.  Orders:    NM PET CT skull base to mid thigh; Future

## 2025-02-25 NOTE — PROGRESS NOTES
Name: Heather José      : 1955      MRN: [id number]  Encounter Provider: Nehemiah Pillai MD  Encounter Date: 2025   Encounter department: [facility]  :  Assessment & Plan  Uterine carcinosarcoma (HCC)  Patient is a very pleasant 70-year-old female with a history of stage Ia carcinosarcoma of the uterus now without evidence of recurrence of disease.  She continues stable and will follow-up in 6 months for repeat evaluation and survivorship visit.Patient's neuropathy continues to improve.  She is overall doing well.  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 female     Patient is a very pleasant 70-year-old female with a history of stage Ia uterine carcinosarcoma diagnosed in .  Patient was treated with robot-assisted assisted total laparoscopic hysterectomy bilateral salpingo-oophorectomy sentinel lymph node mapping and biopsy followed by 6 cycles of carboplatin and paclitaxel and vaginal brachytherapy.  The patient has been in remission since that time.  She was last seen 6 months ago.  In the interim she has undergone a PET CT scan which is unremarkable.   IMPRESSION:     1. No findings for FDG avid recurrent disease.       She presents today in follow-up.    Today, the patient is doing well.  She denies significant abdominal pain, pelvic pain, nausea, vomiting, constipation, diarrhea, fevers, chills, or vaginal bleeding.         Pertinent Medical History          Oncology History   Cancer Staging   Uterine carcinosarcoma (HCC)  Staging form: Corpus Uteri - Carcinoma, AJCC 8th Edition  - Clinical stage from 2022: FIGO Stage IA (cT1a, cN0, cM0) - Signed by Nehemiah Pillai MD on  8/26/2022  Histopathologic type: Carcinosarcoma, NOS  Stage prefix: Initial diagnosis  Histologic grade (G): G3  Histologic grading system: 3 grade system  Oncology History   Uterine carcinosarcoma (HCC)   7/20/2022 Biopsy    Endometrium, EMB:  - Markedly atypical glandular and stromal elements, suspicious for carcinosarcoma.  See comment.     Comment: There are superficial strips of atypical glandular elements which are diffusely positive for p53 by immunohistochemistry.  Additionally, the the stromal elements demonstrate significant nuclear pleomorphism.  The overall findings are suspicious for carcinosarcoma.     8/18/2022 Initial Diagnosis    Uterine carcinosarcoma (HCC)     8/26/2022 -  Cancer Staged    Staging form: Corpus Uteri - Carcinoma, AJCC 8th Edition  - Clinical stage from 8/26/2022: FIGO Stage IA (cT1a, cN0, cM0) - Signed by Nehemiah Pillai MD on 8/26/2022  Histopathologic type: Carcinosarcoma  Stage prefix: Initial diagnosis  Histologic grade (G): G3  Histologic grading system: 3 grade system       8/2022 Surgery    Patient and did went robotic hysterectomy bilateral salpingo-oophorectomy sentinel lymph node mapping and biopsy for stage I A non invasive carcinosarcoma of the endometrium.  The uterus did fracture within the vagina but no leakage into the abdomen was noted.     9/19/2022 - 1/4/2023 Chemotherapy    palonosetron (ALOXI), 0.25 mg, Intravenous, Once, 6 of 6 cycles  Administration: 0.25 mg (9/19/2022), 0.25 mg (10/10/2022), 0.25 mg (10/31/2022), 0.25 mg (11/21/2022), 0.25 mg (12/12/2022), 0.25 mg (1/3/2023)  pegfilgrastim (NEULASTA), 6 mg, Subcutaneous, Once, 5 of 5 cycles  Administration: 6 mg (10/11/2022), 6 mg (11/1/2022), 6 mg (11/22/2022), 6 mg (12/13/2022), 6 mg (1/4/2023)  fosaprepitant (EMEND) IVPB, 150 mg, Intravenous, Once, 6 of 6 cycles  Administration: 150 mg (9/19/2022), 150 mg (10/10/2022), 150 mg (10/31/2022), 150 mg (11/21/2022), 150 mg (12/12/2022), 150 mg  (1/3/2023)  CARBOplatin (PARAPLATIN) IVPB (GOG AUC DOSING), 572.4 mg, Intravenous, Once, 6 of 6 cycles  Administration: 572.4 mg (9/19/2022), 579.6 mg (10/10/2022), 579.6 mg (10/31/2022), 575.4 mg (11/21/2022), 564 mg (12/12/2022), 564 mg (1/3/2023)  PACLItaxel (TAXOL) chemo IVPB, 175 mg/m2 = 274.8 mg, Intravenous, Once, 6 of 6 cycles  Administration: 274.8 mg (9/19/2022), 278.4 mg (10/10/2022), 278.4 mg (10/31/2022), 276.6 mg (11/21/2022), 273 mg (12/12/2022), 273 mg (1/3/2023)     2/1/2023 - 2/10/2023 Radiation    Treatments:  Course: C1 HDR 2.5cmCyl  Plan ID Energy Source Fractions Dose per Fraction (cGy) Rx Dose Total Dose Delivered (cGy) Elapsed Days   HDR2.5cm cylinder diameter  Ir-192 3 / 3 700 5mm from cylinder surface 2,100 9    Treatment Dates:  2/1/2023, 2/6/2023, 2/10/2023.         Review of Systems   Constitutional: Negative.    HENT: Negative.     Eyes: Negative.    Respiratory: Negative.     Cardiovascular: Negative.    Gastrointestinal: Negative.    Endocrine: Negative.    Genitourinary: Negative.    Musculoskeletal: Negative.    Skin: Negative.    Neurological: Negative.    Hematological: Negative.    Psychiatric/Behavioral: Negative.      A complete review of systems is negative other than that noted above in the HPI.       Objective   /74   Pulse 84   Temp (!) 97.4 °F (36.3 °C) (Tympanic)   Ht 5' (1.524 m)   Wt 61 kg (134 lb 6.4 oz)   SpO2 97%   BMI 26.25 kg/m²     Body mass index is 26.25 kg/m².  Pain Screening:     ECOG   0  Physical Exam  Constitutional:       Appearance: She is well-developed.   HENT:      Head: Normocephalic and atraumatic.   Neck:      Thyroid: No thyromegaly.   Cardiovascular:      Rate and Rhythm: Normal rate and regular rhythm.      Heart sounds: Normal heart sounds.   Pulmonary:      Effort: Pulmonary effort is normal.      Breath sounds: Normal breath sounds.   Abdominal:      General: Bowel sounds are normal.      Palpations: Abdomen is soft.      Comments:  Well healed laparoscopic incisions.   Genitourinary:     Comments: -Normal external female genitalia, normal Bartholin's and Gilt Edge's glands                  -Normal midline urethral meatus. No lesions notes                  -Bladder without fullness mass or tenderness                  -Vagina without lesion or discharge No significant cystocele or rectocele noted                  -Cervix surgically absent                  -Uterus surgically absent                  -Adnexae surgically absent                  - Anus without fissure of lesion    Musculoskeletal:         General: Normal range of motion.      Cervical back: Normal range of motion and neck supple.   Lymphadenopathy:      Cervical: No cervical adenopathy.   Skin:     General: Skin is warm and dry.   Neurological:      Mental Status: She is alert and oriented to person, place, and time.   Psychiatric:         Behavior: Behavior normal.          Labs: I have reviewed pertinent labs.   Lab Results   Component Value Date/Time     17.9 07/06/2024 07:34 AM     Lab Results   Component Value Date/Time    Potassium 4.4 08/30/2024 11:05 AM    Chloride 104 08/30/2024 11:05 AM    CO2 26 08/30/2024 11:05 AM    BUN 13 08/30/2024 11:05 AM    Creatinine 0.65 08/30/2024 11:05 AM    Calcium 9.3 08/30/2024 11:05 AM    eGFR 89 08/30/2024 11:05 AM     Lab Results   Component Value Date/Time    WBC 7.84 08/30/2024 11:05 AM    Hemoglobin 13.1 08/30/2024 11:05 AM    Hematocrit 42.1 08/30/2024 11:05 AM    MCV 88 08/30/2024 11:05 AM    Platelets 297 08/30/2024 11:05 AM     Lab Results   Component Value Date/Time    Absolute Neutrophils 5.04 08/30/2024 11:05 AM        Trend:  Lab Results   Component Value Date     17.9 07/06/2024     15.1 08/08/2023     13.8 01/19/2023     15.2 12/29/2022     13.1 12/08/2022     37.3 (H) 11/17/2022     45.5 (H) 10/27/2022     67.6 (H) 09/29/2022     85.4 (H) 09/22/2022     84.9 (H)  09/16/2022       Radiology Results Review : I have reviewed images/report studies in PACS as described above (in the HPI).  Other Study Results Review : No additional pertinent studies reviewed.    Administrative Statements   I have spent a total time of 20 minutes in caring for this patient on the day of the visit/encounter including Diagnostic results, Prognosis, Impressions, and Counseling / Coordination of care.

## 2025-03-25 ENCOUNTER — HOSPITAL ENCOUNTER (OUTPATIENT)
Dept: MAMMOGRAPHY | Facility: CLINIC | Age: 70
Discharge: HOME | End: 2025-03-25
Payer: MEDICARE

## 2025-03-25 VITALS — WEIGHT: 134 LBS | BODY MASS INDEX: 26.31 KG/M2 | HEIGHT: 60 IN

## 2025-03-25 DIAGNOSIS — Z12.31 SCREENING MAMMOGRAM, ENCOUNTER FOR: ICD-10-CM

## 2025-03-25 PROCEDURE — 77067 SCR MAMMO BI INCL CAD: CPT

## 2025-03-25 PROCEDURE — 77063 BREAST TOMOSYNTHESIS BI: CPT

## 2025-05-06 DIAGNOSIS — E78.2 HYPERLIPIDEMIA, MIXED: ICD-10-CM

## 2025-05-08 RX ORDER — ROSUVASTATIN CALCIUM 5 MG/1
5 TABLET, COATED ORAL DAILY
Qty: 90 TABLET | Refills: 1 | Status: SHIPPED | OUTPATIENT
Start: 2025-05-08

## (undated) DEVICE — STERILE ICS MINOR PACK: Brand: CARDINAL HEALTH

## (undated) DEVICE — INTENDED FOR TISSUE SEPARATION, AND OTHER PROCEDURES THAT REQUIRE A SHARP SURGICAL BLADE TO PUNCTURE OR CUT.: Brand: BARD-PARKER SAFETY BLADES SIZE 15, STERILE

## (undated) DEVICE — GLOVE INDICATOR PI UNDERGLOVE SZ 7 BLUE

## (undated) DEVICE — TRAY FOLEY 16FR URIMETER SILICONE SURESTEP

## (undated) DEVICE — NEEDLE 25G X 1 1/2

## (undated) DEVICE — KIT, BETHLEHEM ROBOTIC PROST: Brand: CARDINAL HEALTH

## (undated) DEVICE — VIAL DECANTER

## (undated) DEVICE — INTENDED FOR TISSUE SEPARATION, AND OTHER PROCEDURES THAT REQUIRE A SHARP SURGICAL BLADE TO PUNCTURE OR CUT.: Brand: BARD-PARKER SAFETY BLADES SIZE 11, STERILE

## (undated) DEVICE — TROCAR PORT ACCESS 12 X120MM W/BLDLS OPTICAL TIP AIRSEAL

## (undated) DEVICE — CHLORHEXIDINE 4PCT 4 OZ

## (undated) DEVICE — AIRSEAL TUBE SMOKE EVAC LUMENX3 FILTERED

## (undated) DEVICE — ENDOPATH PNEUMONEEDLE INSUFFLATION NEEDLES WITH LUER LOCK CONNECTORS 120MM: Brand: ENDOPATH

## (undated) DEVICE — TIP COVER ACCESSORY

## (undated) DEVICE — ADHESIVE SKIN HIGH VISCOSITY EXOFIN 1ML

## (undated) DEVICE — GLOVE INDICATOR PI UNDERGLOVE SZ 7.5 BLUE

## (undated) DEVICE — BLADELESS OBTURATOR: Brand: WECK VISTA

## (undated) DEVICE — GLOVE SRG BIOGEL 6.5

## (undated) DEVICE — DRAPE TOWEL: Brand: CONVERTORS

## (undated) DEVICE — BAG DECANTER

## (undated) DEVICE — CANNULA SEAL

## (undated) DEVICE — SPECIMEN TRAP: Brand: ARGYLE

## (undated) DEVICE — SPONGE 4 X 4 XRAY 16 PLY STRL LF RFD

## (undated) DEVICE — ARM DRAPE

## (undated) DEVICE — VISUALIZATION SYSTEM: Brand: CLEARIFY

## (undated) DEVICE — LUBRICANT INST ELECTROLUBE ANTISTK WO PAD

## (undated) DEVICE — PROGRASP FORCEPS: Brand: ENDOWRIST

## (undated) DEVICE — SUT VICRYL 3-0 SH 27 IN J416H

## (undated) DEVICE — COLUMN DRAPE

## (undated) DEVICE — COVER PROBE INTRAOPERATIVE 6 X 96 IN

## (undated) DEVICE — SYRINGE 5ML LL

## (undated) DEVICE — MINOR PROCEDURE DRAPE: Brand: CONVERTORS

## (undated) DEVICE — VESSEL SEALER EXTEND: Brand: ENDOWRIST

## (undated) DEVICE — MAYO STAND COVER: Brand: CONVERTORS

## (undated) DEVICE — MEGA SUTURECUT ND: Brand: ENDOWRIST

## (undated) DEVICE — CHLORAPREP HI-LITE 26ML ORANGE

## (undated) DEVICE — PREMIUM DRY TRAY LF: Brand: MEDLINE INDUSTRIES, INC.

## (undated) DEVICE — MONOPOLAR CURVED SCISSORS: Brand: ENDOWRIST

## (undated) DEVICE — ULTRASOUND GEL STERILE FOIL PK

## (undated) DEVICE — SUT MONOCRYL 4-0 PS-2 27 IN Y426H

## (undated) DEVICE — 40595 XL TRENDELENBURG POSITIONING KIT: Brand: 40595 XL TRENDELENBURG POSITIONING KIT

## (undated) DEVICE — SUT STRATAFIX SPIRAL 2-0 CT-1 30 CM SXPP1B410

## (undated) DEVICE — PAD GROUNDING ADULT

## (undated) DEVICE — GLOVE PI ULTRA TOUCH SZ.7.5